# Patient Record
Sex: MALE | Race: WHITE
[De-identification: names, ages, dates, MRNs, and addresses within clinical notes are randomized per-mention and may not be internally consistent; named-entity substitution may affect disease eponyms.]

---

## 2017-03-15 ENCOUNTER — APPOINTMENT (OUTPATIENT)
Dept: INTERNAL MEDICINE | Facility: CLINIC | Age: 57
End: 2017-03-15

## 2017-03-31 ENCOUNTER — APPOINTMENT (OUTPATIENT)
Dept: INTERNAL MEDICINE | Facility: CLINIC | Age: 57
End: 2017-03-31

## 2017-04-07 ENCOUNTER — APPOINTMENT (OUTPATIENT)
Dept: INTERNAL MEDICINE | Facility: CLINIC | Age: 57
End: 2017-04-07

## 2017-04-10 ENCOUNTER — APPOINTMENT (OUTPATIENT)
Dept: OTOLARYNGOLOGY | Facility: CLINIC | Age: 57
End: 2017-04-10

## 2017-04-12 ENCOUNTER — APPOINTMENT (OUTPATIENT)
Dept: INTERNAL MEDICINE | Facility: CLINIC | Age: 57
End: 2017-04-12

## 2017-04-12 VITALS
WEIGHT: 217 LBS | HEIGHT: 72 IN | BODY MASS INDEX: 29.39 KG/M2 | HEART RATE: 61 BPM | DIASTOLIC BLOOD PRESSURE: 98 MMHG | SYSTOLIC BLOOD PRESSURE: 155 MMHG

## 2017-04-13 ENCOUNTER — APPOINTMENT (OUTPATIENT)
Dept: INTERNAL MEDICINE | Facility: CLINIC | Age: 57
End: 2017-04-13

## 2017-04-13 LAB
ALBUMIN SERPL-MCNC: 4.3 G/DL
ALBUMIN/GLOB SERPL: 1.48
ALP SERPL-CCNC: 74 IU/L
ALT SERPL-CCNC: 29 IU/L
ANION GAP SERPL CALC-SCNC: 10 MEQ/L
AST SERPL-CCNC: 30 IU/L
BASOPHILS # BLD: 0.03 TH/MM3
BASOPHILS NFR BLD: 0.3 %
BILIRUB SERPL-MCNC: 0.8 MG/DL
BUN SERPL-MCNC: 9 MG/DL
BUN/CREAT SERPL: 8.5 %
CALCIUM SERPL-MCNC: 10.2 MG/DL
CHLORIDE SERPL-SCNC: 101 MEQ/L
CHOLEST SERPL-MCNC: 146 MG/DL
CO2 SERPL-SCNC: 30 MEQ/L
CREAT SERPL-MCNC: 1.06 MG/DL
EOSINOPHIL # BLD: 0.23 TH/MM3
EOSINOPHIL NFR BLD: 2.6 %
ERYTHROCYTE [DISTWIDTH] IN BLOOD BY AUTOMATED COUNT: 14.6 %
ESTIMATED AVERGAGE GLUCOSE (NORTH): 117 MG/DL
GFR SERPL CREATININE-BSD FRML MDRD: 72
GLUCOSE SERPL-MCNC: 84 MG/DL
GRANULOCYTES # BLD: 4.32 TH/MM3
GRANULOCYTES NFR BLD: 49.8 %
HBA1C MFR BLD: 5.7 %
HCT VFR BLD AUTO: 53.4 %
HDLC SERPL-MCNC: 41 MG/DL
HDLC SERPL: 3.56
HGB BLD-MCNC: 17.8 G/DL
IMM GRANULOCYTES # BLD: 0.02 TH/MM3
IMM GRANULOCYTES NFR BLD: 0.2 %
LDLC SERPL DIRECT ASSAY-MCNC: 82 MG/DL
LYMPHOCYTES # BLD: 3.49 TH/MM3
LYMPHOCYTES NFR BLD: 40.2 %
MCH RBC QN AUTO: 33.3 PG
MCHC RBC AUTO-ENTMCNC: 33.9 G/DL
MCV RBC AUTO: 98.2 FL
MONOCYTES # BLD: 0.6 TH/MM3
MONOCYTES NFR BLD: 6.9 %
PLATELET # BLD: 180 TH/MM3
PMV BLD AUTO: 12.1 FL
POTASSIUM SERPL-SCNC: 4.5 MMOL/L
PROT SERPL-MCNC: 7.2 G/DL
RBC # BLD AUTO: 5.44 MIL/MM3
SODIUM SERPL-SCNC: 141 MEQ/L
TRIGL SERPL-MCNC: 171 MG/DL
VLDLC SERPL-MCNC: 34 MG/DL
WBC # BLD: 8.69 TH/MM3

## 2017-05-10 ENCOUNTER — OUTPATIENT (OUTPATIENT)
Dept: OUTPATIENT SERVICES | Facility: HOSPITAL | Age: 57
LOS: 1 days | Discharge: HOME | End: 2017-05-10

## 2017-05-10 ENCOUNTER — APPOINTMENT (OUTPATIENT)
Dept: INTERNAL MEDICINE | Facility: CLINIC | Age: 57
End: 2017-05-10

## 2017-05-10 VITALS
DIASTOLIC BLOOD PRESSURE: 80 MMHG | HEART RATE: 61 BPM | BODY MASS INDEX: 29.66 KG/M2 | WEIGHT: 219 LBS | HEIGHT: 72 IN | SYSTOLIC BLOOD PRESSURE: 125 MMHG

## 2017-05-10 DIAGNOSIS — R42 DIZZINESS AND GIDDINESS: ICD-10-CM

## 2017-05-24 ENCOUNTER — APPOINTMENT (OUTPATIENT)
Dept: NUTRITION | Facility: CLINIC | Age: 57
End: 2017-05-24

## 2017-05-24 VITALS — WEIGHT: 217.5 LBS | BODY MASS INDEX: 29.5 KG/M2

## 2017-06-28 DIAGNOSIS — E78.5 HYPERLIPIDEMIA, UNSPECIFIED: ICD-10-CM

## 2017-06-28 DIAGNOSIS — I10 ESSENTIAL (PRIMARY) HYPERTENSION: ICD-10-CM

## 2017-06-28 DIAGNOSIS — M54.5 LOW BACK PAIN: ICD-10-CM

## 2017-08-08 ENCOUNTER — OUTPATIENT (OUTPATIENT)
Dept: OUTPATIENT SERVICES | Facility: HOSPITAL | Age: 57
LOS: 1 days | Discharge: HOME | End: 2017-08-08

## 2017-08-08 DIAGNOSIS — K40.30 UNILATERAL INGUINAL HERNIA, WITH OBSTRUCTION, WITHOUT GANGRENE, NOT SPECIFIED AS RECURRENT: ICD-10-CM

## 2017-08-17 ENCOUNTER — OUTPATIENT (OUTPATIENT)
Dept: OUTPATIENT SERVICES | Facility: HOSPITAL | Age: 57
LOS: 1 days | Discharge: HOME | End: 2017-08-17

## 2017-08-17 DIAGNOSIS — K40.30 UNILATERAL INGUINAL HERNIA, WITH OBSTRUCTION, WITHOUT GANGRENE, NOT SPECIFIED AS RECURRENT: ICD-10-CM

## 2017-08-31 ENCOUNTER — OUTPATIENT (OUTPATIENT)
Dept: OUTPATIENT SERVICES | Facility: HOSPITAL | Age: 57
LOS: 1 days | Discharge: HOME | End: 2017-08-31

## 2017-08-31 DIAGNOSIS — K40.30 UNILATERAL INGUINAL HERNIA, WITH OBSTRUCTION, WITHOUT GANGRENE, NOT SPECIFIED AS RECURRENT: ICD-10-CM

## 2017-08-31 DIAGNOSIS — K02.53 DENTAL CARIES ON PIT AND FISSURE SURFACE PENETRATING INTO PULP: ICD-10-CM

## 2017-09-14 ENCOUNTER — OUTPATIENT (OUTPATIENT)
Dept: OUTPATIENT SERVICES | Facility: HOSPITAL | Age: 57
LOS: 1 days | Discharge: HOME | End: 2017-09-14

## 2017-09-14 DIAGNOSIS — K40.30 UNILATERAL INGUINAL HERNIA, WITH OBSTRUCTION, WITHOUT GANGRENE, NOT SPECIFIED AS RECURRENT: ICD-10-CM

## 2017-09-28 ENCOUNTER — OUTPATIENT (OUTPATIENT)
Dept: OUTPATIENT SERVICES | Facility: HOSPITAL | Age: 57
LOS: 1 days | Discharge: HOME | End: 2017-09-28

## 2017-09-28 DIAGNOSIS — K40.30 UNILATERAL INGUINAL HERNIA, WITH OBSTRUCTION, WITHOUT GANGRENE, NOT SPECIFIED AS RECURRENT: ICD-10-CM

## 2017-10-12 ENCOUNTER — OUTPATIENT (OUTPATIENT)
Dept: OUTPATIENT SERVICES | Facility: HOSPITAL | Age: 57
LOS: 1 days | Discharge: HOME | End: 2017-10-12

## 2017-10-12 DIAGNOSIS — K40.30 UNILATERAL INGUINAL HERNIA, WITH OBSTRUCTION, WITHOUT GANGRENE, NOT SPECIFIED AS RECURRENT: ICD-10-CM

## 2017-10-20 ENCOUNTER — OUTPATIENT (OUTPATIENT)
Dept: OUTPATIENT SERVICES | Facility: HOSPITAL | Age: 57
LOS: 1 days | Discharge: HOME | End: 2017-10-20

## 2017-10-20 DIAGNOSIS — K40.30 UNILATERAL INGUINAL HERNIA, WITH OBSTRUCTION, WITHOUT GANGRENE, NOT SPECIFIED AS RECURRENT: ICD-10-CM

## 2017-10-25 ENCOUNTER — APPOINTMENT (OUTPATIENT)
Dept: INTERNAL MEDICINE | Facility: CLINIC | Age: 57
End: 2017-10-25

## 2017-10-25 ENCOUNTER — OUTPATIENT (OUTPATIENT)
Dept: OUTPATIENT SERVICES | Facility: HOSPITAL | Age: 57
LOS: 1 days | Discharge: HOME | End: 2017-10-25

## 2017-10-25 VITALS — HEIGHT: 72 IN | BODY MASS INDEX: 27.09 KG/M2 | WEIGHT: 200 LBS

## 2017-10-25 VITALS
HEART RATE: 59 BPM | SYSTOLIC BLOOD PRESSURE: 130 MMHG | HEIGHT: 72 IN | DIASTOLIC BLOOD PRESSURE: 82 MMHG | WEIGHT: 200 LBS | BODY MASS INDEX: 27.09 KG/M2

## 2017-10-25 DIAGNOSIS — K40.30 UNILATERAL INGUINAL HERNIA, WITH OBSTRUCTION, WITHOUT GANGRENE, NOT SPECIFIED AS RECURRENT: ICD-10-CM

## 2017-10-25 DIAGNOSIS — H61.23 IMPACTED CERUMEN, BILATERAL: ICD-10-CM

## 2017-10-26 DIAGNOSIS — M79.672 PAIN IN LEFT FOOT: ICD-10-CM

## 2017-10-26 DIAGNOSIS — Z23 ENCOUNTER FOR IMMUNIZATION: ICD-10-CM

## 2017-10-26 DIAGNOSIS — I10 ESSENTIAL (PRIMARY) HYPERTENSION: ICD-10-CM

## 2017-10-26 DIAGNOSIS — E78.00 PURE HYPERCHOLESTEROLEMIA, UNSPECIFIED: ICD-10-CM

## 2017-10-27 ENCOUNTER — OUTPATIENT (OUTPATIENT)
Dept: OUTPATIENT SERVICES | Facility: HOSPITAL | Age: 57
LOS: 1 days | Discharge: HOME | End: 2017-10-27

## 2017-10-27 DIAGNOSIS — K40.30 UNILATERAL INGUINAL HERNIA, WITH OBSTRUCTION, WITHOUT GANGRENE, NOT SPECIFIED AS RECURRENT: ICD-10-CM

## 2017-11-08 ENCOUNTER — APPOINTMENT (OUTPATIENT)
Dept: INTERNAL MEDICINE | Facility: CLINIC | Age: 57
End: 2017-11-08

## 2018-02-14 ENCOUNTER — OUTPATIENT (OUTPATIENT)
Dept: OUTPATIENT SERVICES | Facility: HOSPITAL | Age: 58
LOS: 1 days | Discharge: HOME | End: 2018-02-14

## 2018-02-23 ENCOUNTER — OUTPATIENT (OUTPATIENT)
Dept: OUTPATIENT SERVICES | Facility: HOSPITAL | Age: 58
LOS: 1 days | Discharge: HOME | End: 2018-02-23

## 2018-02-23 DIAGNOSIS — K02.52 DENTAL CARIES ON PIT AND FISSURE SURFACE PENETRATING INTO DENTIN: ICD-10-CM

## 2018-03-13 ENCOUNTER — TRANSCRIPTION ENCOUNTER (OUTPATIENT)
Age: 58
End: 2018-03-13

## 2018-06-05 ENCOUNTER — OUTPATIENT (OUTPATIENT)
Dept: OUTPATIENT SERVICES | Facility: HOSPITAL | Age: 58
LOS: 1 days | Discharge: HOME | End: 2018-06-05

## 2018-06-05 ENCOUNTER — APPOINTMENT (OUTPATIENT)
Dept: INTERNAL MEDICINE | Facility: CLINIC | Age: 58
End: 2018-06-05

## 2018-06-05 VITALS
HEIGHT: 72 IN | DIASTOLIC BLOOD PRESSURE: 91 MMHG | SYSTOLIC BLOOD PRESSURE: 156 MMHG | BODY MASS INDEX: 25.87 KG/M2 | WEIGHT: 191 LBS | TEMPERATURE: 98 F | HEART RATE: 60 BPM

## 2018-06-05 NOTE — ASSESSMENT
[FreeTextEntry1] : 1.HTN:elevated BP:recom.continue amlodipine 5 mg,monitor BP t home,f/u in 1 month.\par 2.R.foot pain:x-ray,voltaren gel topical didn't help,meloxicam 15 mg for 2 weeks.\par 3.Cervical radiculopathy:continue gabapentin,dose of metrocarbamol was increased to 3 times daily\par 4.Hypercholesterolemia:check lipids,continue crestor\par 5.Prediabetes:check hgA1C\par

## 2018-06-05 NOTE — PHYSICAL EXAM
[No JVD] : no jugular venous distention [Supple] : supple [Clear to Auscultation] : lungs were clear to auscultation bilaterally [Regular Rhythm] : with a regular rhythm [Normal S1, S2] : normal S1 and S2 [No Edema] : there was no peripheral edema [Soft] : abdomen soft [Non Tender] : non-tender [No Joint Swelling] : no joint swelling

## 2018-06-06 DIAGNOSIS — M79.671 PAIN IN RIGHT FOOT: ICD-10-CM

## 2018-06-06 DIAGNOSIS — E78.00 PURE HYPERCHOLESTEROLEMIA, UNSPECIFIED: ICD-10-CM

## 2018-06-06 DIAGNOSIS — M54.12 RADICULOPATHY, CERVICAL REGION: ICD-10-CM

## 2018-06-06 DIAGNOSIS — I10 ESSENTIAL (PRIMARY) HYPERTENSION: ICD-10-CM

## 2018-06-25 ENCOUNTER — OUTPATIENT (OUTPATIENT)
Dept: OUTPATIENT SERVICES | Facility: HOSPITAL | Age: 58
LOS: 1 days | Discharge: HOME | End: 2018-06-25

## 2018-06-25 DIAGNOSIS — M79.671 PAIN IN RIGHT FOOT: ICD-10-CM

## 2018-06-26 LAB
ALBUMIN SERPL ELPH-MCNC: 4.3 G/DL
ALP BLD-CCNC: 79 U/L
ALT SERPL-CCNC: 13 U/L
ANION GAP SERPL CALC-SCNC: 16 MMOL/L
AST SERPL-CCNC: 19 U/L
BASOPHILS # BLD AUTO: 0.05 K/UL
BASOPHILS NFR BLD AUTO: 0.6 %
BILIRUB SERPL-MCNC: 0.5 MG/DL
BUN SERPL-MCNC: 10 MG/DL
CALCIUM SERPL-MCNC: 9.1 MG/DL
CHLORIDE SERPL-SCNC: 102 MMOL/L
CHOLEST SERPL-MCNC: 123 MG/DL
CHOLEST/HDLC SERPL: 2.4 RATIO
CO2 SERPL-SCNC: 24 MMOL/L
CREAT SERPL-MCNC: 0.8 MG/DL
EOSINOPHIL # BLD AUTO: 0.27 K/UL
EOSINOPHIL NFR BLD AUTO: 3.2 %
ESTIMATED AVERAGE GLUCOSE: 111 MG/DL
GLUCOSE SERPL-MCNC: 89 MG/DL
HBA1C MFR BLD HPLC: 5.5 %
HCT VFR BLD CALC: 49.8 %
HDLC SERPL-MCNC: 51 MG/DL
HGB BLD-MCNC: 16.6 G/DL
IMM GRANULOCYTES NFR BLD AUTO: 0.4 %
LDLC SERPL CALC-MCNC: 60 MG/DL
LYMPHOCYTES # BLD AUTO: 3.14 K/UL
LYMPHOCYTES NFR BLD AUTO: 36.6 %
MAN DIFF?: NORMAL
MCHC RBC-ENTMCNC: 32.5 PG
MCHC RBC-ENTMCNC: 33.3 G/DL
MCV RBC AUTO: 97.6 FL
MONOCYTES # BLD AUTO: 0.56 K/UL
MONOCYTES NFR BLD AUTO: 6.5 %
NEUTROPHILS # BLD AUTO: 4.52 K/UL
NEUTROPHILS NFR BLD AUTO: 52.7 %
PLATELET # BLD AUTO: 174 K/UL
POTASSIUM SERPL-SCNC: 3.8 MMOL/L
PROT SERPL-MCNC: 6.7 G/DL
RBC # BLD: 5.1 M/UL
RBC # FLD: 14 %
SODIUM SERPL-SCNC: 142 MMOL/L
TRIGL SERPL-MCNC: 79 MG/DL
WBC # FLD AUTO: 8.57 K/UL

## 2018-07-26 ENCOUNTER — APPOINTMENT (OUTPATIENT)
Dept: INTERNAL MEDICINE | Facility: CLINIC | Age: 58
End: 2018-07-26

## 2018-07-26 ENCOUNTER — OUTPATIENT (OUTPATIENT)
Dept: OUTPATIENT SERVICES | Facility: HOSPITAL | Age: 58
LOS: 1 days | Discharge: HOME | End: 2018-07-26

## 2018-07-26 VITALS
WEIGHT: 192 LBS | HEIGHT: 72 IN | BODY MASS INDEX: 26.01 KG/M2 | HEART RATE: 56 BPM | TEMPERATURE: 96.7 F | DIASTOLIC BLOOD PRESSURE: 81 MMHG | SYSTOLIC BLOOD PRESSURE: 132 MMHG

## 2018-07-26 DIAGNOSIS — E78.00 PURE HYPERCHOLESTEROLEMIA, UNSPECIFIED: ICD-10-CM

## 2018-07-26 DIAGNOSIS — I10 ESSENTIAL (PRIMARY) HYPERTENSION: ICD-10-CM

## 2018-07-26 NOTE — PHYSICAL EXAM
[Supple] : supple [Clear to Auscultation] : lungs were clear to auscultation bilaterally [Regular Rhythm] : with a regular rhythm [Normal S1, S2] : normal S1 and S2 [No Murmur] : no murmur heard [No Edema] : there was no peripheral edema [Soft] : abdomen soft [Non Tender] : non-tender [Non-distended] : non-distended [No CVA Tenderness] : no CVA  tenderness [No Joint Swelling] : no joint swelling [de-identified] : nodules on the skin

## 2018-07-26 NOTE — PLAN
[FreeTextEntry1] : 1.HTN:per home readings BP is well controlled on amlodipine\par 2.Right foot pain:on x ray severe degenerative changes;per pt pain was decreased on meloxicam and diclofenac gel;recom.podiatry consult\par 3.Hypercholesterolemia:continue rosuvastatin\par 4.Nodules on the skin:dermatology consult

## 2018-08-17 ENCOUNTER — EMERGENCY (EMERGENCY)
Facility: HOSPITAL | Age: 58
LOS: 0 days | Discharge: HOME | End: 2018-08-18
Attending: EMERGENCY MEDICINE | Admitting: EMERGENCY MEDICINE

## 2018-08-17 ENCOUNTER — TRANSCRIPTION ENCOUNTER (OUTPATIENT)
Age: 58
End: 2018-08-17

## 2018-08-17 VITALS
TEMPERATURE: 99 F | OXYGEN SATURATION: 97 % | DIASTOLIC BLOOD PRESSURE: 69 MMHG | HEART RATE: 70 BPM | SYSTOLIC BLOOD PRESSURE: 130 MMHG | RESPIRATION RATE: 18 BRPM

## 2018-08-17 DIAGNOSIS — R07.9 CHEST PAIN, UNSPECIFIED: ICD-10-CM

## 2018-08-17 DIAGNOSIS — F17.210 NICOTINE DEPENDENCE, CIGARETTES, UNCOMPLICATED: ICD-10-CM

## 2018-08-17 DIAGNOSIS — R91.1 SOLITARY PULMONARY NODULE: ICD-10-CM

## 2018-08-17 DIAGNOSIS — I10 ESSENTIAL (PRIMARY) HYPERTENSION: ICD-10-CM

## 2018-08-17 DIAGNOSIS — E78.4 OTHER HYPERLIPIDEMIA: ICD-10-CM

## 2018-08-17 DIAGNOSIS — Z88.0 ALLERGY STATUS TO PENICILLIN: ICD-10-CM

## 2018-08-17 LAB
ALBUMIN SERPL ELPH-MCNC: 4.3 G/DL — SIGNIFICANT CHANGE UP (ref 3.5–5.2)
ALP SERPL-CCNC: 79 U/L — SIGNIFICANT CHANGE UP (ref 30–115)
ALT FLD-CCNC: 15 U/L — SIGNIFICANT CHANGE UP (ref 0–41)
ANION GAP SERPL CALC-SCNC: 11 MMOL/L — SIGNIFICANT CHANGE UP (ref 7–14)
APTT BLD: 44.9 SEC — HIGH (ref 27–39.2)
AST SERPL-CCNC: 21 U/L — SIGNIFICANT CHANGE UP (ref 0–41)
BASOPHILS # BLD AUTO: 0.05 K/UL — SIGNIFICANT CHANGE UP (ref 0–0.2)
BASOPHILS NFR BLD AUTO: 0.5 % — SIGNIFICANT CHANGE UP (ref 0–1)
BILIRUB SERPL-MCNC: 0.4 MG/DL — SIGNIFICANT CHANGE UP (ref 0.2–1.2)
BUN SERPL-MCNC: 11 MG/DL — SIGNIFICANT CHANGE UP (ref 10–20)
CALCIUM SERPL-MCNC: 9.9 MG/DL — SIGNIFICANT CHANGE UP (ref 8.5–10.1)
CHLORIDE SERPL-SCNC: 105 MMOL/L — SIGNIFICANT CHANGE UP (ref 98–110)
CO2 SERPL-SCNC: 26 MMOL/L — SIGNIFICANT CHANGE UP (ref 17–32)
CREAT SERPL-MCNC: 0.9 MG/DL — SIGNIFICANT CHANGE UP (ref 0.7–1.5)
EOSINOPHIL # BLD AUTO: 0.16 K/UL — SIGNIFICANT CHANGE UP (ref 0–0.7)
EOSINOPHIL NFR BLD AUTO: 1.8 % — SIGNIFICANT CHANGE UP (ref 0–8)
GLUCOSE SERPL-MCNC: 94 MG/DL — SIGNIFICANT CHANGE UP (ref 70–99)
HCT VFR BLD CALC: 47.6 % — SIGNIFICANT CHANGE UP (ref 42–52)
HGB BLD-MCNC: 16.1 G/DL — SIGNIFICANT CHANGE UP (ref 14–18)
IMM GRANULOCYTES NFR BLD AUTO: 0.2 % — SIGNIFICANT CHANGE UP (ref 0.1–0.3)
INR BLD: 0.93 RATIO — SIGNIFICANT CHANGE UP (ref 0.65–1.3)
LYMPHOCYTES # BLD AUTO: 2.45 K/UL — SIGNIFICANT CHANGE UP (ref 1.2–3.4)
LYMPHOCYTES # BLD AUTO: 26.8 % — SIGNIFICANT CHANGE UP (ref 20.5–51.1)
MCHC RBC-ENTMCNC: 32.1 PG — HIGH (ref 27–31)
MCHC RBC-ENTMCNC: 33.8 G/DL — SIGNIFICANT CHANGE UP (ref 32–37)
MCV RBC AUTO: 95 FL — HIGH (ref 80–94)
MONOCYTES # BLD AUTO: 0.58 K/UL — SIGNIFICANT CHANGE UP (ref 0.1–0.6)
MONOCYTES NFR BLD AUTO: 6.4 % — SIGNIFICANT CHANGE UP (ref 1.7–9.3)
NEUTROPHILS # BLD AUTO: 5.87 K/UL — SIGNIFICANT CHANGE UP (ref 1.4–6.5)
NEUTROPHILS NFR BLD AUTO: 64.3 % — SIGNIFICANT CHANGE UP (ref 42.2–75.2)
NRBC # BLD: 0 /100 WBCS — SIGNIFICANT CHANGE UP (ref 0–0)
PLATELET # BLD AUTO: 166 K/UL — SIGNIFICANT CHANGE UP (ref 130–400)
POTASSIUM SERPL-MCNC: 5.1 MMOL/L — HIGH (ref 3.5–5)
POTASSIUM SERPL-SCNC: 5.1 MMOL/L — HIGH (ref 3.5–5)
PROT SERPL-MCNC: 6.7 G/DL — SIGNIFICANT CHANGE UP (ref 6–8)
PROTHROM AB SERPL-ACNC: 10 SEC — SIGNIFICANT CHANGE UP (ref 9.95–12.87)
RBC # BLD: 5.01 M/UL — SIGNIFICANT CHANGE UP (ref 4.7–6.1)
RBC # FLD: 13.8 % — SIGNIFICANT CHANGE UP (ref 11.5–14.5)
SODIUM SERPL-SCNC: 142 MMOL/L — SIGNIFICANT CHANGE UP (ref 135–146)
TROPONIN T SERPL-MCNC: <0.01 NG/ML — SIGNIFICANT CHANGE UP
TROPONIN T SERPL-MCNC: <0.01 NG/ML — SIGNIFICANT CHANGE UP
WBC # BLD: 9.13 K/UL — SIGNIFICANT CHANGE UP (ref 4.8–10.8)
WBC # FLD AUTO: 9.13 K/UL — SIGNIFICANT CHANGE UP (ref 4.8–10.8)

## 2018-08-17 RX ORDER — DIPHENHYDRAMINE HCL 50 MG
50 CAPSULE ORAL ONCE
Qty: 0 | Refills: 0 | Status: COMPLETED | OUTPATIENT
Start: 2018-08-17 | End: 2018-08-17

## 2018-08-17 RX ORDER — ATORVASTATIN CALCIUM 80 MG/1
40 TABLET, FILM COATED ORAL ONCE
Qty: 0 | Refills: 0 | Status: COMPLETED | OUTPATIENT
Start: 2018-08-17 | End: 2018-08-17

## 2018-08-17 RX ORDER — METHOCARBAMOL 500 MG/1
750 TABLET, FILM COATED ORAL ONCE
Qty: 0 | Refills: 0 | Status: COMPLETED | OUTPATIENT
Start: 2018-08-17 | End: 2018-08-17

## 2018-08-17 RX ORDER — SODIUM CHLORIDE 9 MG/ML
3 INJECTION INTRAMUSCULAR; INTRAVENOUS; SUBCUTANEOUS ONCE
Qty: 0 | Refills: 0 | Status: COMPLETED | OUTPATIENT
Start: 2018-08-17 | End: 2018-08-17

## 2018-08-17 RX ORDER — ASPIRIN/CALCIUM CARB/MAGNESIUM 324 MG
325 TABLET ORAL ONCE
Qty: 0 | Refills: 0 | Status: COMPLETED | OUTPATIENT
Start: 2018-08-17 | End: 2018-08-17

## 2018-08-17 RX ORDER — GABAPENTIN 400 MG/1
100 CAPSULE ORAL ONCE
Qty: 0 | Refills: 0 | Status: COMPLETED | OUTPATIENT
Start: 2018-08-17 | End: 2018-08-17

## 2018-08-17 RX ADMIN — Medication 325 MILLIGRAM(S): at 14:22

## 2018-08-17 RX ADMIN — ATORVASTATIN CALCIUM 40 MILLIGRAM(S): 80 TABLET, FILM COATED ORAL at 21:19

## 2018-08-17 RX ADMIN — GABAPENTIN 100 MILLIGRAM(S): 400 CAPSULE ORAL at 21:19

## 2018-08-17 RX ADMIN — METHOCARBAMOL 750 MILLIGRAM(S): 500 TABLET, FILM COATED ORAL at 21:20

## 2018-08-17 RX ADMIN — SODIUM CHLORIDE 3 MILLILITER(S): 9 INJECTION INTRAMUSCULAR; INTRAVENOUS; SUBCUTANEOUS at 14:00

## 2018-08-17 RX ADMIN — Medication 50 MILLIGRAM(S): at 23:03

## 2018-08-17 NOTE — ED ADULT NURSE NOTE - NSIMPLEMENTINTERV_GEN_ALL_ED
Implemented All Universal Safety Interventions:  [place] to call system. Call bell, personal items and telephone within reach. Instruct patient to call for assistance. Room bathroom lighting operational. Non-slip footwear when patient is off stretcher. Physically safe environment: no spills, clutter or unnecessary equipment. Stretcher in lowest position, wheels locked, appropriate side rails in place.

## 2018-08-17 NOTE — ED PROVIDER NOTE - NS ED ROS FT
Constitutional: see HPI  Eyes:  No visual changes, eye pain or discharge.  ENMT:  No hearing changes, pain, discharge or infections. No neck pain or stiffness.  Cardiac:  see HPI  Respiratory:  No cough or respiratory distress. No hemoptysis. No history of asthma or RAD.  GI:  No nausea, vomiting, diarrhea or abdominal pain.  :  No dysuria, frequency or burning.  MS:  No myalgia, muscle weakness, joint pain or back pain.  Neuro:  No headache or weakness.  No LOC.  Skin:  No skin rash.   Endocrine: No history of thyroid disease or diabetes.

## 2018-08-17 NOTE — ED ADULT NURSE NOTE - OBJECTIVE STATEMENT
Pt c/o chest tightness intermittently over the past month. Pt reports pain is worse on exertion. Pt states he developed pain yesterday while mowing the lawn. No SOB. No other complaints.

## 2018-08-17 NOTE — ED PROVIDER NOTE - PHYSICAL EXAMINATION
VITAL SIGNS: noted  CONSTITUTIONAL: Well-developed; well-nourished; in no acute distress  HEAD: Normocephalic; atraumatic  EYES: PERRL, EOM intact; conjunctiva and sclera clear  ENT: No nasal discharge; airway clear. MMM  NECK: Supple; non tender. No anterior cervical lymphadenopathy noted  CARD: S1, S2 normal; no murmurs, gallops, or rubs. Regular rate and rhythm  CHEST: no chest wall tenderness  RESP: CTAB/L, no wheezes, rales or rhonchi  ABD: Normal bowel sounds; soft; non-distended; non-tender; no hepatosplenomegaly. No CVA tenderness  EXT: Normal ROM. No calf tenderness or edema. Distal pulses intact  NEURO: Alert, oriented. Grossly unremarkable. No focal deficits

## 2018-08-17 NOTE — ED ADULT NURSE REASSESSMENT NOTE - NS ED NURSE REASSESS COMMENT FT1
Pt A&Ox4, on observation for chest pain. Pt pending CCTA. Pt denying chest pain, sob at this time. Pt in no acute distress. Pt refusing cardiac monitoring, PA aware. Will continue to monitor

## 2018-08-17 NOTE — ED CDU PROVIDER INITIAL DAY NOTE - OBJECTIVE STATEMENT
Patient sent to [facility] for further evaluation of atypical chest pains for 2-3 days, denies NV no SOB, no diaphoresis

## 2018-08-17 NOTE — ED CDU PROVIDER INITIAL DAY NOTE - PROGRESS NOTE DETAILS
Pt seen at bedside, NAD. Arrived during the daytime, received from Dr Goldstein. Pt presenting for chest pain. Cardiac enzymes negative x 2. Pt scheduled for CCTA in the am. Will continue to monitor. Home medications given to patient, included Gabapentin 100mg, Robaxin 750mg and Atorvastatin 40mg.

## 2018-08-17 NOTE — ED PROVIDER NOTE - OBJECTIVE STATEMENT
59 yo male with PMH HTN, HLD presents c/o intermittent chest pain x 1 month. Pt states last episodes were yesterday and occurred while he was mowing the lawn. Denies any SOB, palpitations, weakness or dizziness. No radiation of pain.  Denies any N/V/D or abdominal pain.  No fevers, chills, cough or URI sx. Pt father  from MI at 71. No previous cardiac workup.

## 2018-08-17 NOTE — ED CDU PROVIDER INITIAL DAY NOTE - ATTENDING CONTRIBUTION TO CARE
Brought to [facility] for further evaluation of atypical chest pains, lungs- clear, abdomen- soft no tenderness to any region, neuro- non focal, s1s2 no gallops or murmurs, full workup in progress will continue to re-evaluate

## 2018-08-17 NOTE — ED ADULT NURSE REASSESSMENT NOTE - NS ED NURSE REASSESS COMMENT FT1
Pt refusing cardiac monitoring. Pt instructed of need for cardiac monitoring, pt with chief complaint of chest pain. Pt verbalizes understanding and does not want cardiac monitoring. PA aware

## 2018-08-18 VITALS
OXYGEN SATURATION: 97 % | DIASTOLIC BLOOD PRESSURE: 74 MMHG | RESPIRATION RATE: 18 BRPM | HEART RATE: 57 BPM | TEMPERATURE: 97 F | SYSTOLIC BLOOD PRESSURE: 125 MMHG

## 2018-08-18 RX ORDER — METOPROLOL TARTRATE 50 MG
50 TABLET ORAL ONCE
Qty: 0 | Refills: 0 | Status: COMPLETED | OUTPATIENT
Start: 2018-08-18 | End: 2018-08-18

## 2018-08-18 RX ORDER — GABAPENTIN 400 MG/1
100 CAPSULE ORAL ONCE
Qty: 0 | Refills: 0 | Status: COMPLETED | OUTPATIENT
Start: 2018-08-18 | End: 2018-08-18

## 2018-08-18 RX ADMIN — Medication 50 MILLIGRAM(S): at 07:57

## 2018-08-18 RX ADMIN — GABAPENTIN 100 MILLIGRAM(S): 400 CAPSULE ORAL at 11:12

## 2018-08-18 NOTE — ED CDU PROVIDER SUBSEQUENT DAY NOTE - NS ED ROS FT
Review of Systems:  CONSTITUTIONAL: no fever  EYES: no photophobia, no blurred vision  ENT: no ear pain, no nasal discharge  RESPIRATORY: no shortness of breath, no cough  CARDIAC:+ chest pain, no palpitations  GI: no abd pain, no nausea, no vomiting, no diarrhea, no constipation,   : no dysuria; no hematuria,   MUSCULOSKELETAL: no joint paint  NEUROLOGIC: no headache,   PSYCH: no anxiety, non suicidal, non homicidal, no hallucination, no depression

## 2018-08-18 NOTE — ED CDU PROVIDER SUBSEQUENT DAY NOTE - PHYSICAL EXAMINATION
VITAL SIGNS: I have reviewed nursing notes and confirm.  CONSTITUTIONAL: Well-developed; well-nourished; in no acute distress.  SKIN: Skin exam is warm and dry, <2 sec cap refill  HEAD: Normocephalic; atraumatic.  EYES: PERRL, EOM intact; normal conjunctiva.  ENT: MMM; airway clear.   CARD: RRR, 2+ dp pulses  RESP: No wheezes, rales or rhonchi, speaking in full sentences  ABD: soft non tender.   EXT: Normal ROM. No edema.  NEURO: Alert, oriented. Grossly unremarkable. No focal deficits.  PSYCH: Cooperative, appropriate.

## 2018-08-18 NOTE — ED CDU PROVIDER SUBSEQUENT DAY NOTE - PROGRESS NOTE DETAILS
57 y/o M, h/o HTN, hyperlipidemia, tomacco use, presents with inermittent CP onset a month ago. Pt states his pain can start at rest or on exercsion. Currently pt is asymptomatic and without complaints. pt has never seen a cardiologist in the past. pt given 50mg of lopressor PO. resting comfortably. awaiting CCTA. Pt back from CCTA. pt without complaints, eating breakfast and awaiting results. Dr. Goldstein discussed results with pt and discussed follow up for pulmonary nodule seen on CT. pt given a copy of results and encouraged to follow up with cardiology.

## 2018-08-18 NOTE — ED CDU PROVIDER SUBSEQUENT DAY NOTE - HISTORY
Pt resting comfortably, NAD. Pt scheduled for CCTA this am. Pt given Benadryl 50mg IV to sleep @ 11pm

## 2018-08-18 NOTE — ED CDU PROVIDER DISPOSITION NOTE - CLINICAL COURSE
Patient was sent to [facility] for further evaluation of atypical chest pains, Has remained in no distress and with stable vitals since arrival to [facility] , ekgs times two no evidence of ischemic changes, enzymes times two normal, CCTA minimal stenosis to coronaries with multiple lung nodules, Patient advised to STOP SMOKING and must fallow up with both PMD for further evaluation of pulmonary nodules and cardiology for mininimal stenosis, Patient discharged completely asymptomatic and without any complaints. Copies of all blood work and other studies were given to patient

## 2018-08-20 PROBLEM — E78.5 HYPERLIPIDEMIA, UNSPECIFIED: Chronic | Status: ACTIVE | Noted: 2018-08-17

## 2018-08-20 PROBLEM — I10 ESSENTIAL (PRIMARY) HYPERTENSION: Chronic | Status: ACTIVE | Noted: 2018-08-17

## 2018-08-22 ENCOUNTER — OUTPATIENT (OUTPATIENT)
Dept: OUTPATIENT SERVICES | Facility: HOSPITAL | Age: 58
LOS: 1 days | Discharge: HOME | End: 2018-08-22

## 2018-08-22 ENCOUNTER — APPOINTMENT (OUTPATIENT)
Dept: PODIATRY | Facility: CLINIC | Age: 58
End: 2018-08-22
Payer: MEDICARE

## 2018-08-22 VITALS
WEIGHT: 192 LBS | DIASTOLIC BLOOD PRESSURE: 78 MMHG | SYSTOLIC BLOOD PRESSURE: 127 MMHG | HEIGHT: 72 IN | BODY MASS INDEX: 26.01 KG/M2 | HEART RATE: 58 BPM

## 2018-08-22 PROCEDURE — 99203 OFFICE O/P NEW LOW 30 MIN: CPT | Mod: 25

## 2018-08-22 PROCEDURE — 20600 DRAIN/INJ JOINT/BURSA W/O US: CPT

## 2018-08-31 ENCOUNTER — OUTPATIENT (OUTPATIENT)
Dept: OUTPATIENT SERVICES | Facility: HOSPITAL | Age: 58
LOS: 1 days | Discharge: HOME | End: 2018-08-31

## 2018-08-31 ENCOUNTER — APPOINTMENT (OUTPATIENT)
Dept: INTERNAL MEDICINE | Facility: CLINIC | Age: 58
End: 2018-08-31

## 2018-08-31 VITALS
DIASTOLIC BLOOD PRESSURE: 83 MMHG | HEART RATE: 59 BPM | SYSTOLIC BLOOD PRESSURE: 130 MMHG | TEMPERATURE: 98.2 F | HEIGHT: 72 IN | WEIGHT: 188 LBS | BODY MASS INDEX: 25.47 KG/M2

## 2018-08-31 NOTE — PHYSICAL EXAM
[Supple] : supple [Clear to Auscultation] : lungs were clear to auscultation bilaterally [Regular Rhythm] : with a regular rhythm [No Edema] : there was no peripheral edema [Soft] : abdomen soft [Non Tender] : non-tender [Non-distended] : non-distended

## 2018-08-31 NOTE — ASSESSMENT
[FreeTextEntry1] : 58 M here for routine f/u and recent admission for CP\par \par 1. Chest pain \par - CT angiogram-no obstructive CAD\par \par 2. left lobe lung nodules found accidently on CT angiogram\par - PET scan\par - pulmonologist referral, scheduled for Sept 11 2018\par \par 3. HTN\par - controlled \par - pt check BP logs every day\par - average /80 +/- 10 \par - c/w current BP meds\par \par 4. chronic lbp, joint pain\par - c/w meloxicam, robaxin\par \par 5. right foot osteoarthritis\par - podiatrist f/u in one month \par - s/p steroid injection\par \par 6. Skin nodules\par - dermatology appointment scheduled\par 7. HCM\par - colonoscopy done 2010?\par - pneumococcal vaccine at 65\par - RTC 3 months, flu vaccine \par - up to date with optometry- new prescription for glasses given this year

## 2018-08-31 NOTE — REVIEW OF SYSTEMS
[Earache] : earache [Chest Pain] : chest pain [Joint Pain] : joint pain [Joint Stiffness] : joint stiffness [Back Pain] : back pain [Negative] : Heme/Lymph [Hearing Loss] : no hearing loss [Nosebleeds] : no nosebleeds [Postnasal Drip] : no postnasal drip [Nasal Discharge] : no nasal discharge [Sore Throat] : no sore throat [Hoarseness] : no hoarseness [Palpitations] : no palpitations [Claudication] : no  leg claudication [Lower Ext Edema] : no lower extremity edema [Orthopena] : no orthopnea [Paroysmal Nocturnal Dyspnea] : no paroysmal nocturnal dyspnea [Muscle Pain] : no muscle pain [Muscle Weakness] : no muscle weakness [Joint Swelling] : no joint swelling [FreeTextEntry4] : left, stuffiness and achy, intermittent [FreeTextEntry5] : left sided, intermittent

## 2018-08-31 NOTE — HISTORY OF PRESENT ILLNESS
[FreeTextEntry1] : routine f/u  [de-identified] : 58 M presents for f/u visit. On 08/16 pt was mowing lawn on a hot day, felt chest tightness on the left side, which pt states has been coming and going for the past month, improves with rest and calming down. The tightness came more with exertion. The pain radiated to the arm, pt explained of pressure on the left side of the chest. Pt did not have heart palpitations, SOB , n/v , Ha, dizziness. On 8/17, the pt checkin to [facility] where an ECG was done which showed left atrial enlargement, NSR, low voltage in precordial leads. The physician recommended pt go to the ER. Pt drove to the ER where they did additional ECGs, which demonstrated NSR, RV conduction delay, and possible anterior infarct of unknown age. Troponins were negative, CXR was unremarkable. A CT heart with coronaries revealed minimal luminal irregularity of left main coronary artery, prox to mid LAD, and mid Lcx 2/2 calcified plaques, total coronary calcium score 146, corresponding to 94th percentile. Emphysematous changes of lungs, multiple left upper lobe anterior solid nodules measuring up to 1.1 cm, mild enlarged mediastinal and right hilar LN. A PET scan was recommended for further testing. The pt reports smoking up to 1 ppd for 20 years.

## 2018-09-04 DIAGNOSIS — E78.00 PURE HYPERCHOLESTEROLEMIA, UNSPECIFIED: ICD-10-CM

## 2018-09-04 DIAGNOSIS — R91.8 OTHER NONSPECIFIC ABNORMAL FINDING OF LUNG FIELD: ICD-10-CM

## 2018-09-04 DIAGNOSIS — I10 ESSENTIAL (PRIMARY) HYPERTENSION: ICD-10-CM

## 2018-09-10 ENCOUNTER — EMERGENCY (EMERGENCY)
Facility: HOSPITAL | Age: 58
LOS: 0 days | Discharge: HOME | End: 2018-09-10
Attending: EMERGENCY MEDICINE | Admitting: EMERGENCY MEDICINE

## 2018-09-10 VITALS
TEMPERATURE: 99 F | HEART RATE: 60 BPM | RESPIRATION RATE: 18 BRPM | DIASTOLIC BLOOD PRESSURE: 75 MMHG | OXYGEN SATURATION: 98 % | SYSTOLIC BLOOD PRESSURE: 128 MMHG

## 2018-09-10 VITALS
DIASTOLIC BLOOD PRESSURE: 85 MMHG | HEART RATE: 57 BPM | SYSTOLIC BLOOD PRESSURE: 134 MMHG | OXYGEN SATURATION: 98 % | TEMPERATURE: 97 F | RESPIRATION RATE: 18 BRPM

## 2018-09-10 DIAGNOSIS — Z79.899 OTHER LONG TERM (CURRENT) DRUG THERAPY: ICD-10-CM

## 2018-09-10 DIAGNOSIS — Z88.0 ALLERGY STATUS TO PENICILLIN: ICD-10-CM

## 2018-09-10 DIAGNOSIS — F17.200 NICOTINE DEPENDENCE, UNSPECIFIED, UNCOMPLICATED: ICD-10-CM

## 2018-09-10 DIAGNOSIS — Z79.82 LONG TERM (CURRENT) USE OF ASPIRIN: ICD-10-CM

## 2018-09-10 DIAGNOSIS — R06.02 SHORTNESS OF BREATH: ICD-10-CM

## 2018-09-10 DIAGNOSIS — R07.89 OTHER CHEST PAIN: ICD-10-CM

## 2018-09-10 LAB
ANION GAP SERPL CALC-SCNC: 15 MMOL/L — HIGH (ref 7–14)
BASOPHILS # BLD AUTO: 0.05 K/UL — SIGNIFICANT CHANGE UP (ref 0–0.2)
BASOPHILS NFR BLD AUTO: 0.5 % — SIGNIFICANT CHANGE UP (ref 0–1)
BUN SERPL-MCNC: 10 MG/DL — SIGNIFICANT CHANGE UP (ref 10–20)
CALCIUM SERPL-MCNC: 9.3 MG/DL — SIGNIFICANT CHANGE UP (ref 8.5–10.1)
CHLORIDE SERPL-SCNC: 102 MMOL/L — SIGNIFICANT CHANGE UP (ref 98–110)
CO2 SERPL-SCNC: 24 MMOL/L — SIGNIFICANT CHANGE UP (ref 17–32)
CREAT SERPL-MCNC: 0.9 MG/DL — SIGNIFICANT CHANGE UP (ref 0.7–1.5)
D DIMER BLD IA.RAPID-MCNC: 135 NG/ML DDU — SIGNIFICANT CHANGE UP (ref 0–230)
EOSINOPHIL # BLD AUTO: 0.11 K/UL — SIGNIFICANT CHANGE UP (ref 0–0.7)
EOSINOPHIL NFR BLD AUTO: 1.1 % — SIGNIFICANT CHANGE UP (ref 0–8)
GLUCOSE SERPL-MCNC: 95 MG/DL — SIGNIFICANT CHANGE UP (ref 70–99)
HCT VFR BLD CALC: 46.2 % — SIGNIFICANT CHANGE UP (ref 42–52)
HGB BLD-MCNC: 15.8 G/DL — SIGNIFICANT CHANGE UP (ref 14–18)
IMM GRANULOCYTES NFR BLD AUTO: 0.2 % — SIGNIFICANT CHANGE UP (ref 0.1–0.3)
LYMPHOCYTES # BLD AUTO: 2.64 K/UL — SIGNIFICANT CHANGE UP (ref 1.2–3.4)
LYMPHOCYTES # BLD AUTO: 27.5 % — SIGNIFICANT CHANGE UP (ref 20.5–51.1)
MCHC RBC-ENTMCNC: 32.5 PG — HIGH (ref 27–31)
MCHC RBC-ENTMCNC: 34.2 G/DL — SIGNIFICANT CHANGE UP (ref 32–37)
MCV RBC AUTO: 95.1 FL — HIGH (ref 80–94)
MONOCYTES # BLD AUTO: 0.64 K/UL — HIGH (ref 0.1–0.6)
MONOCYTES NFR BLD AUTO: 6.7 % — SIGNIFICANT CHANGE UP (ref 1.7–9.3)
NEUTROPHILS # BLD AUTO: 6.14 K/UL — SIGNIFICANT CHANGE UP (ref 1.4–6.5)
NEUTROPHILS NFR BLD AUTO: 64 % — SIGNIFICANT CHANGE UP (ref 42.2–75.2)
PLATELET # BLD AUTO: 153 K/UL — SIGNIFICANT CHANGE UP (ref 130–400)
POTASSIUM SERPL-MCNC: 4.5 MMOL/L — SIGNIFICANT CHANGE UP (ref 3.5–5)
POTASSIUM SERPL-SCNC: 4.5 MMOL/L — SIGNIFICANT CHANGE UP (ref 3.5–5)
RBC # BLD: 4.86 M/UL — SIGNIFICANT CHANGE UP (ref 4.7–6.1)
RBC # FLD: 13.7 % — SIGNIFICANT CHANGE UP (ref 11.5–14.5)
SODIUM SERPL-SCNC: 141 MMOL/L — SIGNIFICANT CHANGE UP (ref 135–146)
TROPONIN T SERPL-MCNC: <0.01 NG/ML — SIGNIFICANT CHANGE UP
WBC # BLD: 9.6 K/UL — SIGNIFICANT CHANGE UP (ref 4.8–10.8)
WBC # FLD AUTO: 9.6 K/UL — SIGNIFICANT CHANGE UP (ref 4.8–10.8)

## 2018-09-10 RX ORDER — ALBUTEROL 90 UG/1
2 AEROSOL, METERED ORAL
Qty: 1 | Refills: 0
Start: 2018-09-10 | End: 2018-10-09

## 2018-09-10 NOTE — ED PROVIDER NOTE - PHYSICAL EXAMINATION
CONSTITUTIONAL: Well-developed; well-nourished; in no acute distress.   SKIN: warm, dry  HEAD: Normocephalic; atraumatic.  CARD: S1, S2 normal; no murmurs, gallops, or rubs. Regular rate and rhythm.   RESP: No wheezes, rales or rhonchi.  ABD: soft ntnd  EXT: Normal ROM.  No clubbing, cyanosis or edema.   NEURO: Alert, oriented, grossly unremarkable  PSYCH: Cooperative, appropriate.

## 2018-09-10 NOTE — ED PROVIDER NOTE - OBJECTIVE STATEMENT
59 y/o male with pmhx of HTN, DLD presents with chest tightness that this morning. Patient states the pain is located on L side, non-radiating; transient. Patient was recently in the ED on 8/18, was placed into obs and received a CCTA with CAD-RAD 1. Patient states he has felt intermittent chest tightness since he left the hospital. Patient states he has an appt with a pulmonologist tomorrow for a 11mm nodule that was found on CCTA. Denies fevers/chils, sob, n/v/d/abdominal pain, leg swelling, calf tenderness, recent travel.

## 2018-09-10 NOTE — ED PROVIDER NOTE - ATTENDING CONTRIBUTION TO CARE
I personally evaluated patient. I agree with the findings and plan with all documentation on chart except as documented  in my note.    Patient presents with atypical chest pain.  Recent CCTA done here shows minimal changes.  Clinically this is not cardiac.  ED work up, including d-dimer normal. Patient is a good candidate for continued outpatient work up.    Full DC instructions discussed and patient knows when to seek immediate medical attention.  Patient has proper follow up.  All results discussed and patient aware they may require further work up.  Proper follow up ensured. Limitations of ED work up discussed.  Medications administered and prescribed/OTC home meds discussed.  All questions and concerns from patient or family addressed. Understanding of instructions verbalized.

## 2018-09-10 NOTE — ED ADULT NURSE NOTE - NSIMPLEMENTINTERV_GEN_ALL_ED
Implemented All Universal Safety Interventions:  [place] to call system. Call bell, personal items and telephone within reach. Instruct patient to call for assistance. Room bathroom lighting operational. Non-slip footwear when patient is off stretcher. Physically safe environment: no spills, clutter or unnecessary equipment. Stretcher in lowest position, wheels locked, appropriate side rails in place.

## 2018-09-10 NOTE — ED PROVIDER NOTE - MEDICAL DECISION MAKING DETAILS
Patient presents with atypical chest pain.  Recent CCTA done here shows minimal changes.  Clinically this is not cardiac.  ED work up, including d-dimer normal. Patient is a good candidate for continued outpatient work up.    Full DC instructions discussed and patient knows when to seek immediate medical attention.  Patient has proper follow up.  All results discussed and patient aware they may require further work up.  Proper follow up ensured. Limitations of ED work up discussed.  Medications administered and prescribed/OTC home meds discussed.  All questions and concerns from patient or family addressed. Understanding of instructions verbalized.

## 2018-09-10 NOTE — ED PROVIDER NOTE - NS ED ROS FT
Constitutional: See HPI.  Cardiac:+ chest pain; No SOB or edema. No chest pain with exertion.  Respiratory: No cough or respiratory distress.   GI: No nausea, vomiting, diarrhea or abdominal pain.  MS: No myalgia, muscle weakness, joint pain or back pain.  Neuro: No headache or weakness. No LOC.  Skin: No skin rash.  Endo: No hx of DM, thyroid disease  Except as documented in HPI, all other review of systems is negative

## 2018-09-10 NOTE — ED ADULT NURSE NOTE - OBJECTIVE STATEMENT
Pt with C.O chest pain on and off for 2-3 month ,radiates to the left arm on and off for 2-3 month worse from today .

## 2018-09-11 ENCOUNTER — OUTPATIENT (OUTPATIENT)
Dept: OUTPATIENT SERVICES | Facility: HOSPITAL | Age: 58
LOS: 1 days | Discharge: HOME | End: 2018-09-11

## 2018-09-11 ENCOUNTER — APPOINTMENT (OUTPATIENT)
Dept: PULMONOLOGY | Facility: CLINIC | Age: 58
End: 2018-09-11

## 2018-09-11 VITALS
SYSTOLIC BLOOD PRESSURE: 133 MMHG | OXYGEN SATURATION: 96 % | HEIGHT: 78 IN | HEART RATE: 55 BPM | WEIGHT: 189 LBS | BODY MASS INDEX: 21.87 KG/M2 | DIASTOLIC BLOOD PRESSURE: 83 MMHG

## 2018-09-13 ENCOUNTER — OUTPATIENT (OUTPATIENT)
Dept: OUTPATIENT SERVICES | Facility: HOSPITAL | Age: 58
LOS: 1 days | Discharge: HOME | End: 2018-09-13

## 2018-09-13 DIAGNOSIS — R91.8 OTHER NONSPECIFIC ABNORMAL FINDING OF LUNG FIELD: ICD-10-CM

## 2018-09-17 ENCOUNTER — OUTPATIENT (OUTPATIENT)
Dept: OUTPATIENT SERVICES | Facility: HOSPITAL | Age: 58
LOS: 1 days | Discharge: HOME | End: 2018-09-17

## 2018-09-17 DIAGNOSIS — R91.1 SOLITARY PULMONARY NODULE: ICD-10-CM

## 2018-09-17 LAB — GLUCOSE BLDC GLUCOMTR-MCNC: 104 MG/DL — HIGH (ref 70–99)

## 2018-09-20 ENCOUNTER — OUTPATIENT (OUTPATIENT)
Dept: OUTPATIENT SERVICES | Facility: HOSPITAL | Age: 58
LOS: 1 days | Discharge: HOME | End: 2018-09-20

## 2018-09-26 ENCOUNTER — LABORATORY RESULT (OUTPATIENT)
Age: 58
End: 2018-09-26

## 2018-09-26 ENCOUNTER — OUTPATIENT (OUTPATIENT)
Dept: OUTPATIENT SERVICES | Facility: HOSPITAL | Age: 58
LOS: 1 days | Discharge: HOME | End: 2018-09-26

## 2018-09-26 ENCOUNTER — APPOINTMENT (OUTPATIENT)
Dept: OTOLARYNGOLOGY | Facility: CLINIC | Age: 58
End: 2018-09-26
Payer: MEDICARE

## 2018-09-26 PROCEDURE — 99204 OFFICE O/P NEW MOD 45 MIN: CPT | Mod: 25

## 2018-09-26 PROCEDURE — 31575 DIAGNOSTIC LARYNGOSCOPY: CPT

## 2018-09-27 DIAGNOSIS — E04.9 NONTOXIC GOITER, UNSPECIFIED: ICD-10-CM

## 2018-10-09 ENCOUNTER — APPOINTMENT (OUTPATIENT)
Dept: PODIATRY | Facility: CLINIC | Age: 58
End: 2018-10-09

## 2018-10-10 ENCOUNTER — APPOINTMENT (OUTPATIENT)
Dept: OTOLARYNGOLOGY | Facility: CLINIC | Age: 58
End: 2018-10-10
Payer: MEDICARE

## 2018-10-10 PROCEDURE — 99215 OFFICE O/P EST HI 40 MIN: CPT

## 2018-10-16 ENCOUNTER — LABORATORY RESULT (OUTPATIENT)
Age: 58
End: 2018-10-16

## 2018-10-16 ENCOUNTER — OUTPATIENT (OUTPATIENT)
Dept: OUTPATIENT SERVICES | Facility: HOSPITAL | Age: 58
LOS: 1 days | Discharge: HOME | End: 2018-10-16

## 2018-10-16 ENCOUNTER — APPOINTMENT (OUTPATIENT)
Dept: PULMONOLOGY | Facility: CLINIC | Age: 58
End: 2018-10-16

## 2018-10-16 VITALS
WEIGHT: 189 LBS | HEART RATE: 60 BPM | DIASTOLIC BLOOD PRESSURE: 83 MMHG | HEIGHT: 72 IN | SYSTOLIC BLOOD PRESSURE: 127 MMHG | BODY MASS INDEX: 25.6 KG/M2 | OXYGEN SATURATION: 96 %

## 2018-10-16 DIAGNOSIS — Z80.8 FAMILY HISTORY OF MALIGNANT NEOPLASM OF OTHER ORGANS OR SYSTEMS: ICD-10-CM

## 2018-10-16 DIAGNOSIS — Z83.1 FAMILY HISTORY OF OTHER INFECTIOUS AND PARASITIC DISEASES: ICD-10-CM

## 2018-10-16 DIAGNOSIS — R91.1 SOLITARY PULMONARY NODULE: ICD-10-CM

## 2018-10-16 DIAGNOSIS — Z80.0 FAMILY HISTORY OF MALIGNANT NEOPLASM OF DIGESTIVE ORGANS: ICD-10-CM

## 2018-10-17 ENCOUNTER — RESULT CHARGE (OUTPATIENT)
Age: 58
End: 2018-10-17

## 2018-10-17 ENCOUNTER — APPOINTMENT (OUTPATIENT)
Dept: CARDIOLOGY | Facility: CLINIC | Age: 58
End: 2018-10-17

## 2018-10-17 VITALS
HEART RATE: 54 BPM | WEIGHT: 193 LBS | BODY MASS INDEX: 26.14 KG/M2 | DIASTOLIC BLOOD PRESSURE: 80 MMHG | SYSTOLIC BLOOD PRESSURE: 114 MMHG | HEIGHT: 72 IN

## 2018-10-17 RX ORDER — ALBUTEROL SULFATE 90 UG/1
108 (90 BASE) AEROSOL, METERED RESPIRATORY (INHALATION)
Qty: 1 | Refills: 3 | Status: COMPLETED | COMMUNITY
Start: 2018-10-16 | End: 2018-10-17

## 2018-10-29 ENCOUNTER — APPOINTMENT (OUTPATIENT)
Dept: PODIATRY | Facility: CLINIC | Age: 58
End: 2018-10-29
Payer: MEDICARE

## 2018-10-29 ENCOUNTER — OUTPATIENT (OUTPATIENT)
Dept: OUTPATIENT SERVICES | Facility: HOSPITAL | Age: 58
LOS: 1 days | Discharge: HOME | End: 2018-10-29

## 2018-10-29 VITALS
HEIGHT: 72 IN | BODY MASS INDEX: 26.01 KG/M2 | SYSTOLIC BLOOD PRESSURE: 117 MMHG | DIASTOLIC BLOOD PRESSURE: 75 MMHG | WEIGHT: 192 LBS

## 2018-10-29 PROCEDURE — 10060 I&D ABSCESS SIMPLE/SINGLE: CPT | Mod: 59

## 2018-10-29 PROCEDURE — 20600 DRAIN/INJ JOINT/BURSA W/O US: CPT | Mod: RT

## 2018-10-29 PROCEDURE — 11730 AVULSION NAIL PLATE SIMPLE 1: CPT | Mod: 59

## 2018-11-05 ENCOUNTER — LABORATORY RESULT (OUTPATIENT)
Age: 58
End: 2018-11-05

## 2018-11-05 ENCOUNTER — OUTPATIENT (OUTPATIENT)
Dept: OUTPATIENT SERVICES | Facility: HOSPITAL | Age: 58
LOS: 1 days | Discharge: HOME | End: 2018-11-05

## 2018-11-05 DIAGNOSIS — I25.10 ATHEROSCLEROTIC HEART DISEASE OF NATIVE CORONARY ARTERY WITHOUT ANGINA PECTORIS: ICD-10-CM

## 2018-11-05 LAB
CHOLEST SERPL-MCNC: 128 MG/DL
CHOLEST/HDLC SERPL: 2.4 RATIO
HDLC SERPL-MCNC: 53 MG/DL
LDLC SERPL CALC-MCNC: 62 MG/DL
TRIGL SERPL-MCNC: 100 MG/DL

## 2018-11-06 DIAGNOSIS — M79.671 PAIN IN RIGHT FOOT: ICD-10-CM

## 2018-11-06 DIAGNOSIS — M19.071 PRIMARY OSTEOARTHRITIS, RIGHT ANKLE AND FOOT: ICD-10-CM

## 2018-11-06 DIAGNOSIS — L60.0 INGROWING NAIL: ICD-10-CM

## 2018-11-06 DIAGNOSIS — L02.619 CUTANEOUS ABSCESS OF UNSPECIFIED FOOT: ICD-10-CM

## 2018-11-06 DIAGNOSIS — M79.672 PAIN IN LEFT FOOT: ICD-10-CM

## 2018-11-14 ENCOUNTER — FORM ENCOUNTER (OUTPATIENT)
Age: 58
End: 2018-11-14

## 2018-11-15 ENCOUNTER — OUTPATIENT (OUTPATIENT)
Dept: OUTPATIENT SERVICES | Facility: HOSPITAL | Age: 58
LOS: 1 days | Discharge: HOME | End: 2018-11-15

## 2018-11-15 DIAGNOSIS — R07.9 CHEST PAIN, UNSPECIFIED: ICD-10-CM

## 2018-11-15 DIAGNOSIS — E04.9 NONTOXIC GOITER, UNSPECIFIED: ICD-10-CM

## 2018-11-15 RX ORDER — REGADENOSON 0.08 MG/ML
0.4 INJECTION, SOLUTION INTRAVENOUS ONCE
Qty: 0 | Refills: 0 | Status: COMPLETED | OUTPATIENT
Start: 2018-11-15 | End: 2018-11-15

## 2018-11-15 RX ADMIN — REGADENOSON 0.4 MILLIGRAM(S): 0.08 INJECTION, SOLUTION INTRAVENOUS at 11:37

## 2018-11-29 ENCOUNTER — OUTPATIENT (OUTPATIENT)
Dept: OUTPATIENT SERVICES | Facility: HOSPITAL | Age: 58
LOS: 1 days | Discharge: HOME | End: 2018-11-29

## 2018-11-29 ENCOUNTER — APPOINTMENT (OUTPATIENT)
Dept: PODIATRY | Facility: CLINIC | Age: 58
End: 2018-11-29
Payer: MEDICARE

## 2018-11-29 VITALS
DIASTOLIC BLOOD PRESSURE: 92 MMHG | HEIGHT: 72 IN | HEART RATE: 90 BPM | BODY MASS INDEX: 26.01 KG/M2 | SYSTOLIC BLOOD PRESSURE: 164 MMHG | WEIGHT: 192 LBS

## 2018-11-29 PROCEDURE — 20605 DRAIN/INJ JOINT/BURSA W/O US: CPT

## 2018-12-04 ENCOUNTER — APPOINTMENT (OUTPATIENT)
Dept: DERMATOLOGY | Facility: CLINIC | Age: 58
End: 2018-12-04

## 2018-12-04 ENCOUNTER — OUTPATIENT (OUTPATIENT)
Dept: OUTPATIENT SERVICES | Facility: HOSPITAL | Age: 58
LOS: 1 days | Discharge: HOME | End: 2018-12-04

## 2018-12-04 VITALS
HEIGHT: 70 IN | HEART RATE: 74 BPM | SYSTOLIC BLOOD PRESSURE: 124 MMHG | TEMPERATURE: 97 F | WEIGHT: 194.01 LBS | OXYGEN SATURATION: 98 % | DIASTOLIC BLOOD PRESSURE: 81 MMHG | RESPIRATION RATE: 16 BRPM

## 2018-12-04 DIAGNOSIS — Z01.818 ENCOUNTER FOR OTHER PREPROCEDURAL EXAMINATION: ICD-10-CM

## 2018-12-04 DIAGNOSIS — E04.1 NONTOXIC SINGLE THYROID NODULE: ICD-10-CM

## 2018-12-04 DIAGNOSIS — E04.9 NONTOXIC GOITER, UNSPECIFIED: ICD-10-CM

## 2018-12-04 DIAGNOSIS — Z98.890 OTHER SPECIFIED POSTPROCEDURAL STATES: Chronic | ICD-10-CM

## 2018-12-04 DIAGNOSIS — Z96.643 PRESENCE OF ARTIFICIAL HIP JOINT, BILATERAL: Chronic | ICD-10-CM

## 2018-12-04 DIAGNOSIS — R91.8 OTHER NONSPECIFIC ABNORMAL FINDING OF LUNG FIELD: ICD-10-CM

## 2018-12-04 LAB
ALBUMIN SERPL ELPH-MCNC: 4.4 G/DL — SIGNIFICANT CHANGE UP (ref 3.5–5.2)
ALP SERPL-CCNC: 83 U/L — SIGNIFICANT CHANGE UP (ref 30–115)
ALT FLD-CCNC: 17 U/L — SIGNIFICANT CHANGE UP (ref 0–41)
ANION GAP SERPL CALC-SCNC: 13 MMOL/L — SIGNIFICANT CHANGE UP (ref 7–14)
APTT BLD: 33.4 SEC — SIGNIFICANT CHANGE UP (ref 27–39.2)
AST SERPL-CCNC: 19 U/L — SIGNIFICANT CHANGE UP (ref 0–41)
BASOPHILS # BLD AUTO: 0.05 K/UL — SIGNIFICANT CHANGE UP (ref 0–0.2)
BASOPHILS NFR BLD AUTO: 0.5 % — SIGNIFICANT CHANGE UP (ref 0–1)
BILIRUB SERPL-MCNC: 0.3 MG/DL — SIGNIFICANT CHANGE UP (ref 0.2–1.2)
BUN SERPL-MCNC: 10 MG/DL — SIGNIFICANT CHANGE UP (ref 10–20)
CALCIUM SERPL-MCNC: 10.2 MG/DL — HIGH (ref 8.5–10.1)
CHLORIDE SERPL-SCNC: 103 MMOL/L — SIGNIFICANT CHANGE UP (ref 98–110)
CO2 SERPL-SCNC: 29 MMOL/L — SIGNIFICANT CHANGE UP (ref 17–32)
CREAT SERPL-MCNC: 0.9 MG/DL — SIGNIFICANT CHANGE UP (ref 0.7–1.5)
EOSINOPHIL # BLD AUTO: 0.15 K/UL — SIGNIFICANT CHANGE UP (ref 0–0.7)
EOSINOPHIL NFR BLD AUTO: 1.5 % — SIGNIFICANT CHANGE UP (ref 0–8)
GLUCOSE SERPL-MCNC: 80 MG/DL — SIGNIFICANT CHANGE UP (ref 70–99)
HCT VFR BLD CALC: 48.9 % — SIGNIFICANT CHANGE UP (ref 42–52)
HGB BLD-MCNC: 16.8 G/DL — SIGNIFICANT CHANGE UP (ref 14–18)
IMM GRANULOCYTES NFR BLD AUTO: 0.2 % — SIGNIFICANT CHANGE UP (ref 0.1–0.3)
INR BLD: 0.81 RATIO — SIGNIFICANT CHANGE UP (ref 0.65–1.3)
LYMPHOCYTES # BLD AUTO: 3.36 K/UL — SIGNIFICANT CHANGE UP (ref 1.2–3.4)
LYMPHOCYTES # BLD AUTO: 33.6 % — SIGNIFICANT CHANGE UP (ref 20.5–51.1)
MCHC RBC-ENTMCNC: 32.9 PG — HIGH (ref 27–31)
MCHC RBC-ENTMCNC: 34.4 G/DL — SIGNIFICANT CHANGE UP (ref 32–37)
MCV RBC AUTO: 95.7 FL — HIGH (ref 80–94)
MONOCYTES # BLD AUTO: 0.58 K/UL — SIGNIFICANT CHANGE UP (ref 0.1–0.6)
MONOCYTES NFR BLD AUTO: 5.8 % — SIGNIFICANT CHANGE UP (ref 1.7–9.3)
NEUTROPHILS # BLD AUTO: 5.84 K/UL — SIGNIFICANT CHANGE UP (ref 1.4–6.5)
NEUTROPHILS NFR BLD AUTO: 58.4 % — SIGNIFICANT CHANGE UP (ref 42.2–75.2)
NRBC # BLD: 0 /100 WBCS — SIGNIFICANT CHANGE UP (ref 0–0)
PLATELET # BLD AUTO: 181 K/UL — SIGNIFICANT CHANGE UP (ref 130–400)
POTASSIUM SERPL-MCNC: 4.5 MMOL/L — SIGNIFICANT CHANGE UP (ref 3.5–5)
POTASSIUM SERPL-SCNC: 4.5 MMOL/L — SIGNIFICANT CHANGE UP (ref 3.5–5)
PROT SERPL-MCNC: 7.1 G/DL — SIGNIFICANT CHANGE UP (ref 6–8)
PROTHROM AB SERPL-ACNC: 9.3 SEC — LOW (ref 9.95–12.87)
RBC # BLD: 5.11 M/UL — SIGNIFICANT CHANGE UP (ref 4.7–6.1)
RBC # FLD: 13.7 % — SIGNIFICANT CHANGE UP (ref 11.5–14.5)
SODIUM SERPL-SCNC: 145 MMOL/L — SIGNIFICANT CHANGE UP (ref 135–146)
WBC # BLD: 10 K/UL — SIGNIFICANT CHANGE UP (ref 4.8–10.8)
WBC # FLD AUTO: 10 K/UL — SIGNIFICANT CHANGE UP (ref 4.8–10.8)

## 2018-12-04 RX ORDER — METHOCARBAMOL 500 MG/1
2 TABLET, FILM COATED ORAL
Qty: 0 | Refills: 0 | COMMUNITY

## 2018-12-04 RX ORDER — GABAPENTIN 400 MG/1
0 CAPSULE ORAL
Qty: 0 | Refills: 0 | COMMUNITY

## 2018-12-04 NOTE — H&P PST ADULT - PMH
Anxiety    Back pain    Emphysema/COPD  "mild" per pt  Gallstones    Goiter    HLD (hyperlipidemia)    HTN (hypertension)    OA (osteoarthritis)

## 2018-12-04 NOTE — H&P PST ADULT - REASON FOR ADMISSION
59 yo male presents with "enlarged thyroid, I have a benign goiter& nodule" pt is scheduled for total thyroidectomy;  denies chest pain, palpitations, shortness of breath, dyspnea, or dysuria. exercise tolerance: 2+ blocks/ flights of stairs w/o sob  per pt gets occasional chest pain per pt "I think it is stress, but I am seeing the cardiologist tomorrow I had a stress test"

## 2018-12-05 ENCOUNTER — APPOINTMENT (OUTPATIENT)
Dept: OTOLARYNGOLOGY | Facility: CLINIC | Age: 58
End: 2018-12-05
Payer: MEDICARE

## 2018-12-05 ENCOUNTER — APPOINTMENT (OUTPATIENT)
Dept: CARDIOLOGY | Facility: CLINIC | Age: 58
End: 2018-12-05

## 2018-12-05 VITALS
SYSTOLIC BLOOD PRESSURE: 142 MMHG | HEART RATE: 59 BPM | DIASTOLIC BLOOD PRESSURE: 92 MMHG | BODY MASS INDEX: 26.04 KG/M2 | HEIGHT: 72 IN

## 2018-12-05 VITALS
SYSTOLIC BLOOD PRESSURE: 133 MMHG | HEIGHT: 72 IN | DIASTOLIC BLOOD PRESSURE: 91 MMHG | WEIGHT: 192 LBS | BODY MASS INDEX: 26.01 KG/M2

## 2018-12-05 VITALS — DIASTOLIC BLOOD PRESSURE: 90 MMHG | SYSTOLIC BLOOD PRESSURE: 130 MMHG

## 2018-12-05 DIAGNOSIS — H54.7 UNSPECIFIED VISUAL LOSS: ICD-10-CM

## 2018-12-05 PROCEDURE — 99213 OFFICE O/P EST LOW 20 MIN: CPT

## 2018-12-05 NOTE — ASSESSMENT
[FreeTextEntry1] : 57 yo M with h/o DL, HTN,  pulmonary nodules (likely benign, following with Dr. Olivo, repeat CT chest 12/'18),  L thyroid mass (not cancerous on biopsy but needs removal),. The patient is going for total thyroidectomy on 12-17-18 . . The patient states he has had no further chest pain Lexiscan stress test was negative for ischemia. He has more than 4 METS exercise tolerance and has nonobstructive CAD on CTA of the coronary arteries . The patient is an intermediate cardiac risk undergoing an intermediate risk procedure. \par \par

## 2018-12-05 NOTE — REASON FOR VISIT
[Initial Evaluation] : an initial evaluation of [Abnormal Test Result] : an abnormal test result [Coronary Artery Disease] : coronary artery disease

## 2018-12-05 NOTE — PHYSICAL EXAM
[General Appearance - Well Developed] : well developed [Normal Appearance] : normal appearance [Well Groomed] : well groomed [General Appearance - Well Nourished] : well nourished [No Deformities] : no deformities [General Appearance - In No Acute Distress] : no acute distress [Normal Conjunctiva] : the conjunctiva exhibited no abnormalities [Eyelids - No Xanthelasma] : the eyelids demonstrated no xanthelasmas [Respiration, Rhythm And Depth] : normal respiratory rhythm and effort [Exaggerated Use Of Accessory Muscles For Inspiration] : no accessory muscle use [Auscultation Breath Sounds / Voice Sounds] : lungs were clear to auscultation bilaterally [Abdomen Soft] : soft [Abdomen Tenderness] : non-tender [Abdomen Mass (___ Cm)] : no abdominal mass palpated [Abnormal Walk] : normal gait [Skin Color & Pigmentation] : normal skin color and pigmentation [] : no rash [No Venous Stasis] : no venous stasis [Skin Lesions] : no skin lesions [No Skin Ulcers] : no skin ulcer [No Xanthoma] : no  xanthoma was observed [Oriented To Time, Place, And Person] : oriented to person, place, and time [Affect] : the affect was normal [Mood] : the mood was normal [No Anxiety] : not feeling anxious [Normal Rate] : normal [Rhythm Regular] : regular [Click] : a ~M click was heard [No Murmur] : no murmurs heard [FreeTextEntry1] : No JVD

## 2018-12-06 ENCOUNTER — OUTPATIENT (OUTPATIENT)
Dept: OUTPATIENT SERVICES | Facility: HOSPITAL | Age: 58
LOS: 1 days | Discharge: HOME | End: 2018-12-06

## 2018-12-06 ENCOUNTER — APPOINTMENT (OUTPATIENT)
Dept: INTERNAL MEDICINE | Facility: CLINIC | Age: 58
End: 2018-12-06

## 2018-12-06 VITALS
DIASTOLIC BLOOD PRESSURE: 89 MMHG | BODY MASS INDEX: 26.82 KG/M2 | SYSTOLIC BLOOD PRESSURE: 140 MMHG | WEIGHT: 198 LBS | HEIGHT: 72 IN | HEART RATE: 63 BPM

## 2018-12-06 DIAGNOSIS — Z98.890 OTHER SPECIFIED POSTPROCEDURAL STATES: Chronic | ICD-10-CM

## 2018-12-06 DIAGNOSIS — Z96.643 PRESENCE OF ARTIFICIAL HIP JOINT, BILATERAL: Chronic | ICD-10-CM

## 2018-12-06 DIAGNOSIS — L02.619 CUTANEOUS ABSCESS OF UNSPECIFIED FOOT: ICD-10-CM

## 2018-12-06 DIAGNOSIS — I10 ESSENTIAL (PRIMARY) HYPERTENSION: ICD-10-CM

## 2018-12-06 DIAGNOSIS — R91.8 OTHER NONSPECIFIC ABNORMAL FINDING OF LUNG FIELD: ICD-10-CM

## 2018-12-06 DIAGNOSIS — E66.3 OVERWEIGHT: ICD-10-CM

## 2018-12-06 DIAGNOSIS — R22.1 LOCALIZED SWELLING, MASS AND LUMP, NECK: ICD-10-CM

## 2018-12-06 DIAGNOSIS — Z01.818 ENCOUNTER FOR OTHER PREPROCEDURAL EXAMINATION: ICD-10-CM

## 2018-12-06 DIAGNOSIS — Z87.898 PERSONAL HISTORY OF OTHER SPECIFIED CONDITIONS: ICD-10-CM

## 2018-12-06 DIAGNOSIS — E78.00 PURE HYPERCHOLESTEROLEMIA, UNSPECIFIED: ICD-10-CM

## 2018-12-06 DIAGNOSIS — Z02.9 ENCOUNTER FOR ADMINISTRATIVE EXAMINATIONS, UNSPECIFIED: ICD-10-CM

## 2018-12-06 PROBLEM — M19.90 UNSPECIFIED OSTEOARTHRITIS, UNSPECIFIED SITE: Chronic | Status: ACTIVE | Noted: 2018-12-04

## 2018-12-06 PROBLEM — K80.20 CALCULUS OF GALLBLADDER WITHOUT CHOLECYSTITIS WITHOUT OBSTRUCTION: Chronic | Status: ACTIVE | Noted: 2018-12-04

## 2018-12-06 PROBLEM — J43.9 EMPHYSEMA, UNSPECIFIED: Chronic | Status: ACTIVE | Noted: 2018-12-04

## 2018-12-06 PROBLEM — M54.9 DORSALGIA, UNSPECIFIED: Chronic | Status: ACTIVE | Noted: 2018-12-04

## 2018-12-06 PROBLEM — E04.9 NONTOXIC GOITER, UNSPECIFIED: Chronic | Status: ACTIVE | Noted: 2018-12-04

## 2018-12-06 PROBLEM — F41.9 ANXIETY DISORDER, UNSPECIFIED: Chronic | Status: ACTIVE | Noted: 2018-12-04

## 2018-12-06 RX ORDER — MELOXICAM 15 MG/1
15 TABLET ORAL
Qty: 14 | Refills: 0 | Status: DISCONTINUED | COMMUNITY
Start: 2018-06-05 | End: 2018-12-06

## 2018-12-06 NOTE — HISTORY OF PRESENT ILLNESS
[de-identified] : 58 yom with pmh of htn, pulmonary nodules, thyroid mass s/p FNA showing benign follicular cells, smoking presents for pre-op clearance; pt is going for hemithyroidectomy by ENT on 12/17; pt denies sob, chest pain, orthopnea; pt was seen by cardio and was cleared for surgery

## 2018-12-06 NOTE — PHYSICAL EXAM
[No Acute Distress] : no acute distress [Well Nourished] : well nourished [Well Developed] : well developed [Well-Appearing] : well-appearing [No JVD] : no jugular venous distention [No Respiratory Distress] : no respiratory distress  [Clear to Auscultation] : lungs were clear to auscultation bilaterally [No Accessory Muscle Use] : no accessory muscle use [Regular Rhythm] : with a regular rhythm [Normal S1, S2] : normal S1 and S2 [No Murmur] : no murmur heard [No Edema] : there was no peripheral edema [Soft] : abdomen soft [Non Tender] : non-tender [Non-distended] : non-distended [No HSM] : no HSM [No Spinal Tenderness] : no spinal tenderness [No Joint Swelling] : no joint swelling [No Rash] : no rash [Normal Gait] : normal gait [No Focal Deficits] : no focal deficits [Normal Affect] : the affect was normal [Normal Insight/Judgement] : insight and judgment were intact

## 2018-12-06 NOTE — ASSESSMENT
[FreeTextEntry1] : 58 yom with pmh of htn, pulmonary nodules, thyroid mass s/p FNA showing benign follicular cells, smoking presents for pre-op clearance; pt is going for hemithyroidectomy by ENT on 12/17; pt denies sob, chest pain, orthopnea; pt was seen by cardio and was cleared for surgery\par \par # Thyroid mass- benign follicular cell\par - pt denies chest pain, sob; pt was cleared by cardiology; pt had a stress test in 11/18 which was negative\par - pt is intermediate risk for intermediate risk procedure\par - pt going for hemithyroidectomy\par \par #  lung nodules\par - pt follows with pulm\par \par # HTN \par - c/w norvasc, aspirin\par \par # DLD\par - c/w statin\par # chronic lbp, joint pain\par - c/w meloxicam, robaxin prn\par \par # Low back pain/ foot OA\par - c/w robaxin prn; c/w gabapentin;\par - c/w voltaren gel\par \par # HCM\par - colonoscopy done 2010?\par - pneumococcal vaccine at 65\par - flu vaccine utd

## 2018-12-10 ENCOUNTER — APPOINTMENT (OUTPATIENT)
Dept: CARDIOLOGY | Facility: CLINIC | Age: 58
End: 2018-12-10

## 2018-12-17 ENCOUNTER — APPOINTMENT (OUTPATIENT)
Dept: OTOLARYNGOLOGY | Facility: HOSPITAL | Age: 58
End: 2018-12-17
Payer: MEDICARE

## 2018-12-17 ENCOUNTER — RESULT REVIEW (OUTPATIENT)
Age: 58
End: 2018-12-17

## 2018-12-17 ENCOUNTER — INPATIENT (INPATIENT)
Facility: HOSPITAL | Age: 58
LOS: 0 days | Discharge: HOME | End: 2018-12-18
Attending: OTOLARYNGOLOGY | Admitting: OTOLARYNGOLOGY

## 2018-12-17 ENCOUNTER — OUTPATIENT (OUTPATIENT)
Dept: OUTPATIENT SERVICES | Facility: HOSPITAL | Age: 58
LOS: 1 days | Discharge: HOME | End: 2018-12-17

## 2018-12-17 VITALS
WEIGHT: 190.04 LBS | SYSTOLIC BLOOD PRESSURE: 128 MMHG | RESPIRATION RATE: 18 BRPM | TEMPERATURE: 98 F | HEART RATE: 62 BPM | HEIGHT: 70.5 IN | DIASTOLIC BLOOD PRESSURE: 78 MMHG

## 2018-12-17 DIAGNOSIS — Z98.890 OTHER SPECIFIED POSTPROCEDURAL STATES: Chronic | ICD-10-CM

## 2018-12-17 DIAGNOSIS — Z96.643 PRESENCE OF ARTIFICIAL HIP JOINT, BILATERAL: Chronic | ICD-10-CM

## 2018-12-17 PROCEDURE — 60240 REMOVAL OF THYROID: CPT

## 2018-12-17 RX ORDER — MORPHINE SULFATE 50 MG/1
4 CAPSULE, EXTENDED RELEASE ORAL
Qty: 0 | Refills: 0 | Status: DISCONTINUED | OUTPATIENT
Start: 2018-12-17 | End: 2018-12-17

## 2018-12-17 RX ORDER — OXYCODONE AND ACETAMINOPHEN 5; 325 MG/1; MG/1
1 TABLET ORAL ONCE
Qty: 0 | Refills: 0 | Status: DISCONTINUED | OUTPATIENT
Start: 2018-12-17 | End: 2018-12-17

## 2018-12-17 RX ORDER — SODIUM CHLORIDE 9 MG/ML
1000 INJECTION, SOLUTION INTRAVENOUS
Qty: 0 | Refills: 0 | Status: DISCONTINUED | OUTPATIENT
Start: 2018-12-17 | End: 2018-12-17

## 2018-12-17 RX ORDER — ATORVASTATIN CALCIUM 80 MG/1
40 TABLET, FILM COATED ORAL AT BEDTIME
Qty: 0 | Refills: 0 | Status: DISCONTINUED | OUTPATIENT
Start: 2018-12-17 | End: 2018-12-18

## 2018-12-17 RX ORDER — OXYCODONE AND ACETAMINOPHEN 5; 325 MG/1; MG/1
1 TABLET ORAL EVERY 4 HOURS
Qty: 0 | Refills: 0 | Status: DISCONTINUED | OUTPATIENT
Start: 2018-12-17 | End: 2018-12-18

## 2018-12-17 RX ORDER — ONDANSETRON 8 MG/1
4 TABLET, FILM COATED ORAL EVERY 8 HOURS
Qty: 0 | Refills: 0 | Status: DISCONTINUED | OUTPATIENT
Start: 2018-12-17 | End: 2018-12-18

## 2018-12-17 RX ORDER — METHOCARBAMOL 500 MG/1
750 TABLET, FILM COATED ORAL DAILY
Qty: 0 | Refills: 0 | Status: DISCONTINUED | OUTPATIENT
Start: 2018-12-17 | End: 2018-12-18

## 2018-12-17 RX ORDER — GABAPENTIN 400 MG/1
100 CAPSULE ORAL
Qty: 0 | Refills: 0 | Status: DISCONTINUED | OUTPATIENT
Start: 2018-12-17 | End: 2018-12-18

## 2018-12-17 RX ORDER — SODIUM CHLORIDE 9 MG/ML
3 INJECTION INTRAMUSCULAR; INTRAVENOUS; SUBCUTANEOUS EVERY 8 HOURS
Qty: 0 | Refills: 0 | Status: DISCONTINUED | OUTPATIENT
Start: 2018-12-17 | End: 2018-12-18

## 2018-12-17 RX ORDER — ACETAMINOPHEN 500 MG
650 TABLET ORAL EVERY 4 HOURS
Qty: 0 | Refills: 0 | Status: DISCONTINUED | OUTPATIENT
Start: 2018-12-17 | End: 2018-12-18

## 2018-12-17 RX ORDER — MORPHINE SULFATE 50 MG/1
2 CAPSULE, EXTENDED RELEASE ORAL EVERY 4 HOURS
Qty: 0 | Refills: 0 | Status: DISCONTINUED | OUTPATIENT
Start: 2018-12-17 | End: 2018-12-18

## 2018-12-17 RX ORDER — ALBUTEROL 90 UG/1
2 AEROSOL, METERED ORAL EVERY 6 HOURS
Qty: 0 | Refills: 0 | Status: DISCONTINUED | OUTPATIENT
Start: 2018-12-17 | End: 2018-12-18

## 2018-12-17 RX ORDER — AMLODIPINE BESYLATE 2.5 MG/1
5 TABLET ORAL DAILY
Qty: 0 | Refills: 0 | Status: DISCONTINUED | OUTPATIENT
Start: 2018-12-17 | End: 2018-12-18

## 2018-12-17 RX ORDER — ONDANSETRON 8 MG/1
4 TABLET, FILM COATED ORAL ONCE
Qty: 0 | Refills: 0 | Status: DISCONTINUED | OUTPATIENT
Start: 2018-12-17 | End: 2018-12-17

## 2018-12-17 RX ADMIN — OXYCODONE AND ACETAMINOPHEN 1 TABLET(S): 5; 325 TABLET ORAL at 19:40

## 2018-12-17 RX ADMIN — SODIUM CHLORIDE 100 MILLILITER(S): 9 INJECTION, SOLUTION INTRAVENOUS at 15:20

## 2018-12-17 RX ADMIN — OXYCODONE AND ACETAMINOPHEN 1 TABLET(S): 5; 325 TABLET ORAL at 23:52

## 2018-12-17 RX ADMIN — ATORVASTATIN CALCIUM 40 MILLIGRAM(S): 80 TABLET, FILM COATED ORAL at 21:11

## 2018-12-17 RX ADMIN — MORPHINE SULFATE 2 MILLIGRAM(S): 50 CAPSULE, EXTENDED RELEASE ORAL at 22:00

## 2018-12-17 RX ADMIN — MORPHINE SULFATE 4 MILLIGRAM(S): 50 CAPSULE, EXTENDED RELEASE ORAL at 16:14

## 2018-12-17 RX ADMIN — GABAPENTIN 100 MILLIGRAM(S): 400 CAPSULE ORAL at 23:54

## 2018-12-17 RX ADMIN — SODIUM CHLORIDE 3 MILLILITER(S): 9 INJECTION INTRAMUSCULAR; INTRAVENOUS; SUBCUTANEOUS at 21:06

## 2018-12-17 RX ADMIN — GABAPENTIN 100 MILLIGRAM(S): 400 CAPSULE ORAL at 18:27

## 2018-12-17 RX ADMIN — MORPHINE SULFATE 4 MILLIGRAM(S): 50 CAPSULE, EXTENDED RELEASE ORAL at 15:59

## 2018-12-17 RX ADMIN — OXYCODONE AND ACETAMINOPHEN 1 TABLET(S): 5; 325 TABLET ORAL at 20:30

## 2018-12-17 RX ADMIN — MORPHINE SULFATE 2 MILLIGRAM(S): 50 CAPSULE, EXTENDED RELEASE ORAL at 21:13

## 2018-12-17 NOTE — CHART NOTE - NSCHARTNOTEFT_GEN_A_CORE
PACU ANESTHESIA ADMISSION NOTE      Procedure: Thyroid lobectomy, partial, left, with isthmusectomy    Post op diagnosis:  Thyroid nodule      ____  Intubated  TV:______       Rate: ______      FiO2: ______    _x___  Patent Airway    _x___  Full return of protective reflexes    _x___  Full recovery from anesthesia / back to baseline status    Vitals:            T:  98.1              BP :  121/78              R: 14             Sat: 96              P:80      Mental Status:  _x___ Awake   _____ Alert   _____ Drowsy   _____ Sedated    Nausea/Vomiting:  _x___  NO       ______Yes,   See Post - Op Orders         Pain Scale (0-10):  __0___    Treatment: _x___ None    ____ See Post - Op/PCA Orders    Post - Operative Fluids:   __x__ Oral   ____ See Post - Op Orders    Plan: Discharge:   _x___Home       _____Floor     _____Critical Care    _____  Other:_________________    Comments:  No anesthesia issues or complications noted.  Discharge when criteria met.

## 2018-12-17 NOTE — BRIEF OPERATIVE NOTE - PROCEDURE
<<-----Click on this checkbox to enter Procedure Thyroid lobectomy, partial, left, with isthmusectomy  12/17/2018    Active  [id number]

## 2018-12-17 NOTE — PROGRESS NOTE ADULT - SUBJECTIVE AND OBJECTIVE BOX
PT is a 58 y.o male s/p L hemithyroidectomy, POD #0, seen and examined at bedside. PT doing well, pain better controlled after Morphine given. PT able to tolerate minimal liquid PO. Pt denies any SOB/diff breathing.    Vital Signs: T(F): 97.8 (17 Dec 2018 17:21), Max: 98.5 (17 Dec 2018 09:28), HR: 64, BP: 121/70, RR: 14, SpO2: 96%  GEN: NAD, awake and alert  HEENT: incision C/D/I, no erythema/edema, no hematoma palpated. mild TTP over the area. + hemovac in place draining serosang fluid.

## 2018-12-17 NOTE — PRE-ANESTHESIA EVALUATION ADULT - NSANTHOSAYNRD_GEN_A_CORE
never tested, see screening tool/No. AGUS screening performed.  STOP BANG Legend: 0-2 = LOW Risk; 3-4 = INTERMEDIATE Risk; 5-8 = HIGH Risk

## 2018-12-17 NOTE — PROGRESS NOTE ADULT - ASSESSMENT
58 y.o male s/p L hemithyroidectomy, POD #0.    ·	cont pain control  ·	full liquid, soft diet as tolerated  ·	cont monitoring drain output q shift  ·	anticipate D/C home in AM  ·	w/d with attng

## 2018-12-18 ENCOUNTER — TRANSCRIPTION ENCOUNTER (OUTPATIENT)
Age: 58
End: 2018-12-18

## 2018-12-18 VITALS
SYSTOLIC BLOOD PRESSURE: 115 MMHG | DIASTOLIC BLOOD PRESSURE: 74 MMHG | RESPIRATION RATE: 18 BRPM | TEMPERATURE: 98 F | HEART RATE: 63 BPM

## 2018-12-18 RX ORDER — ACETAMINOPHEN 500 MG
2 TABLET ORAL
Qty: 0 | Refills: 0 | DISCHARGE
Start: 2018-12-18

## 2018-12-18 RX ORDER — MELOXICAM 15 MG/1
1 TABLET ORAL
Qty: 0 | Refills: 0 | COMMUNITY

## 2018-12-18 RX ORDER — ASPIRIN/CALCIUM CARB/MAGNESIUM 324 MG
1 TABLET ORAL
Qty: 0 | Refills: 0 | COMMUNITY

## 2018-12-18 RX ADMIN — SODIUM CHLORIDE 3 MILLILITER(S): 9 INJECTION INTRAMUSCULAR; INTRAVENOUS; SUBCUTANEOUS at 07:43

## 2018-12-18 RX ADMIN — Medication 650 MILLIGRAM(S): at 13:58

## 2018-12-18 RX ADMIN — OXYCODONE AND ACETAMINOPHEN 1 TABLET(S): 5; 325 TABLET ORAL at 03:59

## 2018-12-18 RX ADMIN — GABAPENTIN 100 MILLIGRAM(S): 400 CAPSULE ORAL at 11:31

## 2018-12-18 RX ADMIN — ONDANSETRON 4 MILLIGRAM(S): 8 TABLET, FILM COATED ORAL at 08:53

## 2018-12-18 RX ADMIN — Medication 650 MILLIGRAM(S): at 09:29

## 2018-12-18 RX ADMIN — GABAPENTIN 100 MILLIGRAM(S): 400 CAPSULE ORAL at 05:12

## 2018-12-18 RX ADMIN — MORPHINE SULFATE 2 MILLIGRAM(S): 50 CAPSULE, EXTENDED RELEASE ORAL at 01:17

## 2018-12-18 RX ADMIN — AMLODIPINE BESYLATE 5 MILLIGRAM(S): 2.5 TABLET ORAL at 05:12

## 2018-12-18 RX ADMIN — METHOCARBAMOL 750 MILLIGRAM(S): 500 TABLET, FILM COATED ORAL at 11:32

## 2018-12-18 RX ADMIN — Medication 650 MILLIGRAM(S): at 08:54

## 2018-12-18 NOTE — PROGRESS NOTE ADULT - ASSESSMENT
58 y.o male with hemithyroidectomy, POD #1, doing well post op.    ·	percocet sent to pharmacy  ·	drain removed  ·	ok for D/C home now  ·	Dr Woods at bedside.

## 2018-12-18 NOTE — DISCHARGE NOTE ADULT - PATIENT PORTAL LINK FT
You can access the [facility] Patient Portal, offered by [facility], by registering with the following website: [email or web address]

## 2018-12-18 NOTE — DISCHARGE NOTE ADULT - MEDICATION SUMMARY - MEDICATIONS TO TAKE
I will START or STAY ON the medications listed below when I get home from the hospital:    acetaminophen 325 mg oral tablet  -- 2 tab(s) by mouth every 4 hours, As needed, Temp greater or equal to 38C (100.4F), Mild Pain (1 - 3)  -- Indication: For pain    oxycodone-acetaminophen 5 mg-325 mg oral tablet  -- 1 tab(s) by mouth every 4 hours, As needed, Moderate Pain (4 - 6) MDD:6  -- Indication: For pain    gabapentin 100 mg oral tablet  -- orally 4 times a day  -- Indication: For neurovascular pain    rosuvastatin 10 mg oral tablet  -- 1 tab(s) by mouth once a day (at bedtime)  -- Indication: For high cholesterol    albuterol 90 mcg/inh inhalation aerosol  -- 2 puff(s) inhaled 4 times a day, As Needed  -- For inhalation only.  It is very important that you take or use this exactly as directed.  Do not skip doses or discontinue unless directed by your doctor.  Obtain medical advice before taking any non-prescription drugs as some may affect the action of this medication.  Shake well before use.    -- Indication: For bronchodilator    amLODIPine 5 mg oral tablet  -- 1 tab(s) by mouth once a day  -- Indication: For high blood pressure    methocarbamol 750 mg oral tablet  -- tab(s) by mouth once a day  -- Indication: For muscle relaxant

## 2018-12-18 NOTE — ANESTHESIA FOLLOW-UP NOTE - NSEVALATION_GEN_ALL_CORE
No apparent complications or complaints regarding anesthesia care at this time All questions were answered/No apparent complications or complaints regarding anesthesia care at this time

## 2018-12-18 NOTE — PROGRESS NOTE ADULT - SUBJECTIVE AND OBJECTIVE BOX
Pt is a 58 y.o male s/p laisha thyroidectomy, POD #1, seen and examined at bedside - doing well. PT doing well, had some nausea last night and this AM. Pt was able to tolerate PO diet without vomiting this AM. Pt overall feeling well this AM, better then last night in terms of pain as well.    Vital Signs: T(F): 98.1 (18 Dec 2018 07:23), Max: 99.8 (18 Dec 2018 04:00), HR: 63, BP: 115/74, RR: 18, SpO2: 98%   GEN: NAD, awake and alert.   HEENT: + incision C/D/I, no erythema/edema, no hematoma palpated. area soft, no ecchymosis

## 2018-12-18 NOTE — DISCHARGE NOTE ADULT - CARE PROVIDER_API CALL
Hernesto Woods), Otolaryngology  [address] [place] [address]  Phone: [phone]  Fax: [phone]

## 2018-12-18 NOTE — DISCHARGE NOTE ADULT - CARE PLAN
Principal Discharge DX:	Goiter  Goal:	removal of the Left side of your thyroid  Assessment and plan of treatment:	follow up as outpatient

## 2018-12-24 LAB — SURGICAL PATHOLOGY STUDY: SIGNIFICANT CHANGE UP

## 2018-12-26 DIAGNOSIS — E04.1 NONTOXIC SINGLE THYROID NODULE: ICD-10-CM

## 2018-12-26 DIAGNOSIS — Z88.0 ALLERGY STATUS TO PENICILLIN: ICD-10-CM

## 2018-12-27 ENCOUNTER — APPOINTMENT (OUTPATIENT)
Dept: OTOLARYNGOLOGY | Facility: CLINIC | Age: 58
End: 2018-12-27
Payer: MEDICARE

## 2018-12-27 PROCEDURE — 99024 POSTOP FOLLOW-UP VISIT: CPT

## 2019-01-03 ENCOUNTER — APPOINTMENT (OUTPATIENT)
Dept: OTOLARYNGOLOGY | Facility: CLINIC | Age: 59
End: 2019-01-03
Payer: MEDICARE

## 2019-01-03 PROCEDURE — 99024 POSTOP FOLLOW-UP VISIT: CPT

## 2019-01-08 ENCOUNTER — APPOINTMENT (OUTPATIENT)
Dept: PULMONOLOGY | Facility: CLINIC | Age: 59
End: 2019-01-08

## 2019-01-08 ENCOUNTER — OUTPATIENT (OUTPATIENT)
Dept: OUTPATIENT SERVICES | Facility: HOSPITAL | Age: 59
LOS: 1 days | Discharge: HOME | End: 2019-01-08

## 2019-01-08 VITALS
DIASTOLIC BLOOD PRESSURE: 67 MMHG | BODY MASS INDEX: 26.68 KG/M2 | OXYGEN SATURATION: 97 % | HEART RATE: 83 BPM | HEIGHT: 72 IN | WEIGHT: 197 LBS | SYSTOLIC BLOOD PRESSURE: 115 MMHG

## 2019-01-08 DIAGNOSIS — Z96.643 PRESENCE OF ARTIFICIAL HIP JOINT, BILATERAL: Chronic | ICD-10-CM

## 2019-01-08 DIAGNOSIS — Z98.890 OTHER SPECIFIED POSTPROCEDURAL STATES: Chronic | ICD-10-CM

## 2019-01-08 NOTE — PHYSICAL EXAM
[General Appearance - Well Developed] : well developed [Normal Appearance] : normal appearance [Well Groomed] : well groomed [General Appearance - Well Nourished] : well nourished [No Deformities] : no deformities [General Appearance - In No Acute Distress] : no acute distress [Normal Conjunctiva] : the conjunctiva exhibited no abnormalities [Eyelids - No Xanthelasma] : the eyelids demonstrated no xanthelasmas [Normal Oropharynx] : normal oropharynx [Neck Appearance] : the appearance of the neck was normal [Heart Rate And Rhythm] : heart rate and rhythm were normal [Heart Sounds] : normal S1 and S2 [Murmurs] : no murmurs present [Respiration, Rhythm And Depth] : normal respiratory rhythm and effort [Exaggerated Use Of Accessory Muscles For Inspiration] : no accessory muscle use [Auscultation Breath Sounds / Voice Sounds] : lungs were clear to auscultation bilaterally [Abdomen Soft] : soft [Abdomen Tenderness] : non-tender [Abdomen Mass (___ Cm)] : no abdominal mass palpated [Abnormal Walk] : normal gait [Gait - Sufficient For Exercise Testing] : the gait was sufficient for exercise testing [Nail Clubbing] : no clubbing of the fingernails [Cyanosis, Localized] : no localized cyanosis [Petechial Hemorrhages (___cm)] : no petechial hemorrhages [Skin Color & Pigmentation] : normal skin color and pigmentation [] : no rash [No Venous Stasis] : no venous stasis [Skin Lesions] : no skin lesions [No Skin Ulcers] : no skin ulcer [No Xanthoma] : no  xanthoma was observed [Deep Tendon Reflexes (DTR)] : deep tendon reflexes were 2+ and symmetric [Sensation] : the sensory exam was normal to light touch and pinprick [No Focal Deficits] : no focal deficits [Oriented To Time, Place, And Person] : oriented to person, place, and time [Impaired Insight] : insight and judgment were intact [Affect] : the affect was normal [Arterial Pulses Normal] : the arterial pulses were normal [Edema] : no peripheral edema present [Chest Palpation] : palpation of the chest revealed no abnormalities [Bowel Sounds] : normal bowel sounds [Nail Splinter Hemorrhages] : no splinter hemorrhages of the nails [Fingers Osler's Nodes] : Osler's nodes were not seenon the fingers [Skin Turgor] : normal skin turgor [Motor Exam] : the motor exam was normal [Mood] : the mood was normal [Memory Recent] : recent memory was not impaired [Memory Remote] : remote memory was not impaired

## 2019-01-09 NOTE — HISTORY OF PRESENT ILLNESS
[Difficulty Breathing During Exertion] : denies dyspnea on exertion [Feelings Of Weakness On Exertion] : denies exercise intolerance [Cough] : stable coughing [Wheezing] : denies wheezing [Regional Soft Tissue Swelling Both Lower Extremities] : denies lower extremity edema [Chest Pain Or Discomfort] : denies chest pain [Fever] : denies fever [Date: ___] : was performed [unfilled] [Cigarettes ___ /Day] : reports smoking [unfilled] packs of cigarettes per day [___ Year Quit] : ~He/She~ quit smoking in [unfilled] [___ Pack Year History] : [unfilled] pack year history [Smoking Cessation Medication Prescribed] : was prescribed smoking cessation medication. [None] : None [Adherent] : the patient is adherent with ~his/her~ medication regimen [Goals--Doing Well] : the patient is doing well with ~his/her~ goals [Improved] : have improved [Snoring] : snoring [Wt Gain ___ Lbs] : no recent weight gain [Wt Loss ___ Lbs] : no recent weight loss [Oxygen] : the patient uses no supplemental oxygen [Side Effects] : ~He/She~ denies medication side effects [de-identified] : thyroidectomy 12/17/2018 [de-identified] : stable clustered nodules in medial left upper lobe, largest roughly 1.1 cm.  [Nocturnal Oxygen] : The patient does not use nocturnal oxygen [FreeTextEntry1] : 58 year old man with a PMHx of a cluster of pulmonary nodules found on CT chest, thyroid nodule s/p thyroidectomy 12/17/18, HTN, DLD, Right foot osteoarthritis, chronic back pain. He is here today for follow up of his cluster of pulmonary nodules after a repeat CT chest on 12/4/18. He denies SOB, wheezing, SOB on exertion, cough, weight loss, night sweats. He feels well overall and his only complaint is "recovering from thyroid surgery". He had a thyroidectomy on 12/17/18. Pathology of the thyroid specimen showed no tumor with nodular hyperplasia/multinodular goiter of the thyroid. He recently quit smoking about a month ago after a 40 pack year smoking history. He is still using nicotine replacement therapy(gum and patch) however he still has 1-2 cigarettes per day. He is currently on disability due to his chronic back and hip pain but used to work as a  in the television industry.\par \par CT chest(12/4/18): stable clustered nodules in medial left upper lobe, largest roughly 1.1 cm. \par \par CT(09/30/2018): centrilobular emphysema, RUL calcified subcentimeter granuloma, several solid nodules in anterior EDMUNDO up to 1.1 cm, LLL 0.5 cm subpleural nodule\par \par PET/CT(09/17/2018):minimal uptake in tubular branching opacity in EDMUNDO anterior region with a lobulated nodular component suggestive of mucoid impaction. No FDG activity in any other site \par  \par

## 2019-01-09 NOTE — REVIEW OF SYSTEMS
[Back Pain] : ~T back pain [Arthralgias] : arthralgias [Negative] : Pulmonary Hypertension [As Noted in HPI] : as noted in HPI [Thyroid Problem] : thyroid problem [Snoring] : snoring [Fever] : no fever [Chills] : no chills [Fatigue] : no fatigue [Poor Appetite] : normal appetite  [Recent Wt Gain (___ Lbs)] : no recent weight gain [Recent Wt Loss (___ Lbs)] : no recent weight loss [Cough] : no cough [Sputum] : not coughing up ~M sputum [Hemoptysis] : no hemoptysis [Dyspnea] : no dyspnea [Chest Tightness] : no chest tightness [Pleuritic Pain] : no pleuritic pain [Frequent URIs] : no frequent upper respiratory infections [Wheezing] : no wheezing [Hypertension] : no ~T hypertension [Hypotension] : no hypotension [Chest Discomfort] : no chest discomfort [PND] : no PND [Orthopnea] : no orthopnea [Dysrhythmia] : no dysrhythmia [Palpitations] : no palpitations [Edema] : ~T edema was not present [Claudication] : no intermittent claudication [Leg Cramps] : no leg cramps [Heartburn] : no heartburn [Reflux] : no reflux [Indigestion] : no indigestion [Dysphagia] : no dysphagia [Nausea] : no nausea [Vomiting] : no vomiting [Constipation] : no constipation [Diarrhea] : no diarrhea [Food Intolerance] : no ~T food intolerance [Abdominal Pain] : no abdominal pain [Bleeding] : no bleeding [Headache] : no headache [Dizziness] : no dizziness [Syncope] : no fainting [Numbness] : no numbness [Paralysis] : no paralysis was seen [Seizures] : no seizures [Memory Loss] : no ~T memory lapses or loss [Confusion] : no confusion [Tremor] : ~T no ~M tremor was seen [Depression] : no depression [Anxiety] : no anxiety [Difficulty Initiating Sleep] : no difficulty falling asleep [Difficulty Maintaining Sleep] : no difficulty maintaining sleep [Awakes With Headache] : awakes without a headache [Awakes With Dry Mouth] : awakes without dry mouth

## 2019-01-09 NOTE — REASON FOR VISIT
[Follow-Up] : a follow-up visit [Abnormal CT Scan] : abnormal CT Scan [FreeTextEntry2] : cluster of lung nodules

## 2019-01-09 NOTE — ASSESSMENT
[FreeTextEntry1] : 58 year old man with a PMHx of Pulmonary nodules, thyroid nodule s/p thyroidectomy 12/17/18, HTN, DLD, Right foot osteoarthritis, chronic back pain\par \par Cluster of pulmonary nodules in medial left upper lobe, largest is 1.1 cm in face of significant smoking history; Minimal FDG uptake on PET/CT so likely benign.\par -CT chest(12/4/18): stable clustered nodules in medial left upper lobe, largest roughly 1.1 cm. \par -CT(09/30/2018);cenrilobular emphysema, RUL subcentimeter calcified granuloma, several solid nodules in anterior EDMUNDO up to 1.1 cm, LLL 0.5 cm subpleural nodule\par -PET/CT(09/17/2018):minimal uptake in tubular branching opacity in EDMUNDO anterior region with a lobulated nodular component suggestive of mucoid impaction. No FDG activity in any other site \par - will need repeat CT chest in 6 months\par \par Active smoker\par -patient on nicotine gum and patch and is still responding well.\par -still smoking 1-2 cigarettes per day, required 1-3 nicotine gums daily\par -counselled about smoking cessation, motivated to quit although has his days of stress and will smoke a few.\par \par Emphysema on CT chest\par -patient has a 40 pack year smoking history\par -currently asymptomatic\par \par History of TB exposure from father\par -currently asymptomatic\par -CT chest negative for any typical lesion\par -quantiferon(10/22/18): negative\par \par Healthcare maintainance\par -flu shot administered last visit on 10/16/18\par \par Follow-up in 6 months after CT chest done.\par

## 2019-01-10 ENCOUNTER — APPOINTMENT (OUTPATIENT)
Dept: OTOLARYNGOLOGY | Facility: CLINIC | Age: 59
End: 2019-01-10
Payer: MEDICARE

## 2019-01-10 DIAGNOSIS — T81.49XA INFECTION FOLLOWING A PROCEDURE, OTHER SURGICAL SITE, INITIAL ENCOUNTER: ICD-10-CM

## 2019-01-10 PROCEDURE — 99024 POSTOP FOLLOW-UP VISIT: CPT

## 2019-01-10 NOTE — PHYSICAL EXAM
[de-identified] : supraincisional collection has decreased significantly [Midline] : trachea located in midline position [Normal] : no rashes

## 2019-01-10 NOTE — REASON FOR VISIT
[Post-Operative Visit] : a post-operative visit [FreeTextEntry2] : s/p 12/17/18 partial thyroidectomy

## 2019-01-14 ENCOUNTER — OUTPATIENT (OUTPATIENT)
Dept: OUTPATIENT SERVICES | Facility: HOSPITAL | Age: 59
LOS: 1 days | Discharge: HOME | End: 2019-01-14

## 2019-01-14 DIAGNOSIS — E89.0 POSTPROCEDURAL HYPOTHYROIDISM: ICD-10-CM

## 2019-01-14 DIAGNOSIS — Z96.643 PRESENCE OF ARTIFICIAL HIP JOINT, BILATERAL: Chronic | ICD-10-CM

## 2019-01-14 DIAGNOSIS — E83.51 HYPOCALCEMIA: ICD-10-CM

## 2019-01-14 DIAGNOSIS — Z98.890 OTHER SPECIFIED POSTPROCEDURAL STATES: Chronic | ICD-10-CM

## 2019-01-16 ENCOUNTER — APPOINTMENT (OUTPATIENT)
Dept: OTOLARYNGOLOGY | Facility: CLINIC | Age: 59
End: 2019-01-16

## 2019-02-19 DIAGNOSIS — E04.1 NONTOXIC SINGLE THYROID NODULE: ICD-10-CM

## 2019-02-19 DIAGNOSIS — Z88.0 ALLERGY STATUS TO PENICILLIN: ICD-10-CM

## 2019-03-05 ENCOUNTER — APPOINTMENT (OUTPATIENT)
Dept: OTOLARYNGOLOGY | Facility: CLINIC | Age: 59
End: 2019-03-05
Payer: MEDICARE

## 2019-03-05 VITALS — WEIGHT: 197 LBS | BODY MASS INDEX: 26.68 KG/M2 | HEIGHT: 72 IN

## 2019-03-05 DIAGNOSIS — H92.02 OTALGIA, LEFT EAR: ICD-10-CM

## 2019-03-05 PROCEDURE — 92550 TYMPANOMETRY & REFLEX THRESH: CPT

## 2019-03-05 NOTE — HISTORY OF PRESENT ILLNESS
[FreeTextEntry1] : Patient s/p partial thyroidectomy on 12/17/18.  Doing well post surgery.\par  Patient has had left intermittent otalgia for one year.  Associated symptoms: occasional tinnitus, occasional nasal congestion.   Modifying factors: some improvement with Ibuprofen.  Pain 6-7 on 1-10 scale.

## 2019-03-11 ENCOUNTER — APPOINTMENT (OUTPATIENT)
Dept: PODIATRY | Facility: CLINIC | Age: 59
End: 2019-03-11
Payer: MEDICARE

## 2019-03-11 ENCOUNTER — OUTPATIENT (OUTPATIENT)
Dept: OUTPATIENT SERVICES | Facility: HOSPITAL | Age: 59
LOS: 1 days | Discharge: HOME | End: 2019-03-11

## 2019-03-11 VITALS
HEART RATE: 59 BPM | BODY MASS INDEX: 25.73 KG/M2 | HEIGHT: 72 IN | WEIGHT: 190 LBS | DIASTOLIC BLOOD PRESSURE: 81 MMHG | SYSTOLIC BLOOD PRESSURE: 138 MMHG

## 2019-03-11 DIAGNOSIS — Z98.890 OTHER SPECIFIED POSTPROCEDURAL STATES: Chronic | ICD-10-CM

## 2019-03-11 DIAGNOSIS — Z96.643 PRESENCE OF ARTIFICIAL HIP JOINT, BILATERAL: Chronic | ICD-10-CM

## 2019-03-11 PROCEDURE — 20600 DRAIN/INJ JOINT/BURSA W/O US: CPT

## 2019-03-12 NOTE — HISTORY OF PRESENT ILLNESS
[FreeTextEntry1] : Patient returns to clinic for follow up on pain of the dorsum of his right foot. Patient states that the pain was relieved for 6 weeks because of the injection given to him at the last visit. Patient is interested in another injection at the same site.pt states that injection is helping at this time and no surgical intervention at this time. pt states that no pain on the dorsum of the right foot today. Patient denies NVFC and denies SOB at this time.\par

## 2019-03-12 NOTE — PHYSICAL EXAM
[General Appearance - Alert] : alert [General Appearance - In No Acute Distress] : in no acute distress [General Appearance - Well Nourished] : well nourished [General Appearance - Well Developed] : well developed [General Appearance - Well-Appearing] : healthy appearing [Full Pulse] : the pedal pulses are present [Edema] : there was no peripheral edema [Nail Clubbing] : no clubbing  or cyanosis of the fingernails [Musculoskeletal - Swelling] : no joint swelling seen [Motor Tone] : muscle strength and tone were normal [Skin Color & Pigmentation] : normal skin color and pigmentation [Skin Turgor] : normal skin turgor [] : no rash [Right Foot Was Examined] : The right foot was examined [Left Foot Was Examined] : The left foot was examined [Normal Appearance] : normal in appearance [Tenderness] : tender [Normal] : normal [2+] : 2+ in the dorsalis pedis [Deep Tendon Reflexes (DTR)] : deep tendon reflexes were 2+ and symmetric [Sensation] : the sensory exam was normal to light touch and pinprick [No Focal Deficits] : no focal deficits [Oriented To Time, Place, And Person] : oriented to person, place, and time [Impaired Insight] : insight and judgment were intact [Affect] : the affect was normal [Erythema] : not erythematous [Normal in Appearance] : not normal in appearance [Swelling] : not swollen [Vibration Dec.] : normal vibratory sensation at the level of the toes [Position Sense Dec.] : normal position sense at the level of the toes [Diminished Throughout Right Foot] : normal tactile sensation with monofilament testing throughout right foot [Diminished Throughout Left Foot] : normal tactile sensation with monofilament testing throughout left foot [FreeTextEntry1] : dorsum tarsal metatarsal joint  [FreeTextEntry5] : dorsal tarsal metatarsal joint

## 2019-04-03 ENCOUNTER — OUTPATIENT (OUTPATIENT)
Dept: OUTPATIENT SERVICES | Facility: HOSPITAL | Age: 59
LOS: 1 days | Discharge: HOME | End: 2019-04-03

## 2019-04-03 DIAGNOSIS — Z96.643 PRESENCE OF ARTIFICIAL HIP JOINT, BILATERAL: Chronic | ICD-10-CM

## 2019-04-03 DIAGNOSIS — Z98.890 OTHER SPECIFIED POSTPROCEDURAL STATES: Chronic | ICD-10-CM

## 2019-04-15 DIAGNOSIS — Z01.21 ENCOUNTER FOR DENTAL EXAMINATION AND CLEANING WITH ABNORMAL FINDINGS: ICD-10-CM

## 2019-04-18 ENCOUNTER — APPOINTMENT (OUTPATIENT)
Dept: OTOLARYNGOLOGY | Facility: CLINIC | Age: 59
End: 2019-04-18
Payer: MEDICARE

## 2019-04-18 DIAGNOSIS — H69.82 OTHER SPECIFIED DISORDERS OF EUSTACHIAN TUBE, LEFT EAR: ICD-10-CM

## 2019-04-18 PROCEDURE — 92550 TYMPANOMETRY & REFLEX THRESH: CPT

## 2019-04-18 PROCEDURE — 31231 NASAL ENDOSCOPY DX: CPT

## 2019-04-18 NOTE — REASON FOR VISIT
[Subsequent Evaluation] : a subsequent evaluation for [FreeTextEntry2] : dysfunction of left eustachian tube

## 2019-04-18 NOTE — HISTORY OF PRESENT ILLNESS
[FreeTextEntry1] : Pt returns to the office to follow up on dysfunction of left eustachian tube. Pt was given nasal sprays at last visit. Patient notes relief with Flonase and states to be doing well otherwise. Denies otalgia or otorrhea.

## 2019-04-26 ENCOUNTER — APPOINTMENT (OUTPATIENT)
Dept: INTERNAL MEDICINE | Facility: CLINIC | Age: 59
End: 2019-04-26

## 2019-04-26 ENCOUNTER — OUTPATIENT (OUTPATIENT)
Dept: OUTPATIENT SERVICES | Facility: HOSPITAL | Age: 59
LOS: 1 days | Discharge: HOME | End: 2019-04-26

## 2019-04-26 VITALS
DIASTOLIC BLOOD PRESSURE: 88 MMHG | HEART RATE: 67 BPM | SYSTOLIC BLOOD PRESSURE: 128 MMHG | BODY MASS INDEX: 26.41 KG/M2 | WEIGHT: 195 LBS | TEMPERATURE: 98 F | HEIGHT: 72 IN

## 2019-04-26 DIAGNOSIS — Z98.890 OTHER SPECIFIED POSTPROCEDURAL STATES: Chronic | ICD-10-CM

## 2019-04-26 DIAGNOSIS — Z96.643 PRESENCE OF ARTIFICIAL HIP JOINT, BILATERAL: Chronic | ICD-10-CM

## 2019-04-26 DIAGNOSIS — I10 ESSENTIAL (PRIMARY) HYPERTENSION: ICD-10-CM

## 2019-04-26 RX ORDER — CLINDAMYCIN HYDROCHLORIDE 300 MG/1
300 CAPSULE ORAL EVERY 6 HOURS
Qty: 40 | Refills: 2 | Status: COMPLETED | COMMUNITY
Start: 2019-01-03 | End: 2019-02-02

## 2019-04-26 NOTE — ASSESSMENT
[FreeTextEntry1] : 58 yom with pmh of htn, pulmonary nodules, thyroid mass  s/p hemithyroidectomy in 12/2018, smoking ; pt denies sob, chest pain, orthopnea;\par \par # Thyroid mass s/p hemithyroidectomy, pathology positive for hyperplasia/no tumor seen\par - TSH wnl\par - no symptoms, following ENT \par \par # Eustachian tube dysfunction:\par - evaluated by ENT, ear discomfort 22/to ET tube dysfunction \par - stable with flonase\par \par # lung nodules\par - repeat CT lungs q6 months\par - f/u with pulm\par \par # Current day smoker:\par - on nicotine patch 14mg patch , c/w same\par - declined offer of varenicline\par # HTN \par - c/w norvasc, aspirin\par \par # DLD\par - c/w statin\par \par # chronic lbp, joint pain\par - c/w meloxicam, robaxin prn\par \par # Low back pain/ foot OA\par - c/w robaxin prn; c/w gabapentin\par \par # HCM\par - colonoscopy done 2010?, will give referral when he comes back in 6 months\par - pneumococcal vaccine at 65\par - repeat low dose CT lungs in June 2018, he has script\par - repeat regular labs then \par - RTC in 6 months

## 2019-04-26 NOTE — HISTORY OF PRESENT ILLNESS
[de-identified] : 58 yom with pmh of htn, pulmonary nodules, thyroid mass s/p FNA showing benign follicular cells, smoking presents for pre-op clearance; pt is going for hemithyroidectomy by ENT on 12/17; pt denies sob, chest pain, orthopnea; pt was seen by cardio and was cleared for surgery

## 2019-04-26 NOTE — PHYSICAL EXAM
[EOMI] : extraocular movements intact [No Acute Distress] : no acute distress [No JVD] : no jugular venous distention [No Lymphadenopathy] : no lymphadenopathy [Thyroid Normal, No Nodules] : the thyroid was normal and there were no nodules present [No Respiratory Distress] : no respiratory distress  [Clear to Auscultation] : lungs were clear to auscultation bilaterally [No Accessory Muscle Use] : no accessory muscle use [Normal S1, S2] : normal S1 and S2 [No Murmur] : no murmur heard [No Carotid Bruits] : no carotid bruits [No Edema] : there was no peripheral edema [Soft] : abdomen soft [Non Tender] : non-tender [Normal Posterior Cervical Nodes] : no posterior cervical lymphadenopathy [No HSM] : no HSM [Normal Anterior Cervical Nodes] : no anterior cervical lymphadenopathy [No CVA Tenderness] : no CVA  tenderness [No Rash] : no rash [No Focal Deficits] : no focal deficits [Normal Insight/Judgement] : insight and judgment were intact [Normal Affect] : the affect was normal

## 2019-04-29 ENCOUNTER — OUTPATIENT (OUTPATIENT)
Dept: OUTPATIENT SERVICES | Facility: HOSPITAL | Age: 59
LOS: 1 days | Discharge: HOME | End: 2019-04-29

## 2019-04-29 ENCOUNTER — APPOINTMENT (OUTPATIENT)
Dept: PODIATRY | Facility: CLINIC | Age: 59
End: 2019-04-29
Payer: MEDICARE

## 2019-04-29 VITALS
BODY MASS INDEX: 26.41 KG/M2 | WEIGHT: 195 LBS | HEIGHT: 72 IN | HEART RATE: 54 BPM | DIASTOLIC BLOOD PRESSURE: 76 MMHG | SYSTOLIC BLOOD PRESSURE: 111 MMHG

## 2019-04-29 DIAGNOSIS — Z98.890 OTHER SPECIFIED POSTPROCEDURAL STATES: Chronic | ICD-10-CM

## 2019-04-29 DIAGNOSIS — Z96.643 PRESENCE OF ARTIFICIAL HIP JOINT, BILATERAL: Chronic | ICD-10-CM

## 2019-04-29 DIAGNOSIS — M79.672 PAIN IN LEFT FOOT: ICD-10-CM

## 2019-04-29 PROCEDURE — 20600 DRAIN/INJ JOINT/BURSA W/O US: CPT

## 2019-04-30 ENCOUNTER — OUTPATIENT (OUTPATIENT)
Dept: OUTPATIENT SERVICES | Facility: HOSPITAL | Age: 59
LOS: 1 days | Discharge: HOME | End: 2019-04-30

## 2019-04-30 ENCOUNTER — APPOINTMENT (OUTPATIENT)
Dept: DERMATOLOGY | Facility: CLINIC | Age: 59
End: 2019-04-30

## 2019-04-30 DIAGNOSIS — Z98.890 OTHER SPECIFIED POSTPROCEDURAL STATES: Chronic | ICD-10-CM

## 2019-04-30 DIAGNOSIS — R22.9 LOCALIZED SWELLING, MASS AND LUMP, UNSPECIFIED: ICD-10-CM

## 2019-04-30 DIAGNOSIS — Z96.643 PRESENCE OF ARTIFICIAL HIP JOINT, BILATERAL: Chronic | ICD-10-CM

## 2019-04-30 NOTE — ASSESSMENT
[FreeTextEntry1] : Patient examined, history and chart reviewed\par  discussed complications and benefits of injection in detail  patient consented to injection right foot \par injection of right foot with 1cc betamethasone and 2cc of lidocaine 2%, patient demonstrated verbal \par Follow up in 6 weeks

## 2019-04-30 NOTE — HISTORY OF PRESENT ILLNESS
[FreeTextEntry1] : Numerous skn Bumps [de-identified] : 58 yom with pmh of htn, pulmonary nodules, thyroid mass s/p FNA showing benign follicular cells, smoking presents because od numerous bumps on his chest amdome and shoulder. He says these lesions has been there since his teen years and has not changed in character, size or number. No discharge and no pain.\par He has history of seborrheic keratosis ( s/p cryotherapy years ago) and skin cystic condition ? since childhood.

## 2019-04-30 NOTE — PHYSICAL EXAM
[Full Body Skin Exam Performed] : performed [FreeTextEntry3] : Several skin nodules around 2 cm in size ; some are firm and some are soft.\par on back: several skin tags, hemagiomas and seborrheic keratosis lesions( brown stuck on lesions)

## 2019-04-30 NOTE — ASSESSMENT
[FreeTextEntry1] : #Skin nodules\par r/o lipomas vs seborrheic cysts \par Reassurance given about benign nature of lesions\par Instructed to notify his PMD in case any of the lesion become red or painful\par Education about sun exposure provided

## 2019-05-23 ENCOUNTER — RX CHANGE (OUTPATIENT)
Age: 59
End: 2019-05-23

## 2019-06-06 ENCOUNTER — APPOINTMENT (OUTPATIENT)
Dept: CARDIOLOGY | Facility: CLINIC | Age: 59
End: 2019-06-06

## 2019-06-06 ENCOUNTER — OUTPATIENT (OUTPATIENT)
Dept: OUTPATIENT SERVICES | Facility: HOSPITAL | Age: 59
LOS: 1 days | Discharge: HOME | End: 2019-06-06

## 2019-06-06 DIAGNOSIS — E87.0 HYPEROSMOLALITY AND HYPERNATREMIA: ICD-10-CM

## 2019-06-06 DIAGNOSIS — Z98.890 OTHER SPECIFIED POSTPROCEDURAL STATES: Chronic | ICD-10-CM

## 2019-06-06 DIAGNOSIS — Z96.643 PRESENCE OF ARTIFICIAL HIP JOINT, BILATERAL: Chronic | ICD-10-CM

## 2019-06-14 ENCOUNTER — OUTPATIENT (OUTPATIENT)
Dept: OUTPATIENT SERVICES | Facility: HOSPITAL | Age: 59
LOS: 1 days | Discharge: HOME | End: 2019-06-14

## 2019-06-14 DIAGNOSIS — Z96.643 PRESENCE OF ARTIFICIAL HIP JOINT, BILATERAL: Chronic | ICD-10-CM

## 2019-06-14 DIAGNOSIS — Z98.890 OTHER SPECIFIED POSTPROCEDURAL STATES: Chronic | ICD-10-CM

## 2019-06-16 ENCOUNTER — FORM ENCOUNTER (OUTPATIENT)
Age: 59
End: 2019-06-16

## 2019-06-17 ENCOUNTER — OUTPATIENT (OUTPATIENT)
Dept: OUTPATIENT SERVICES | Facility: HOSPITAL | Age: 59
LOS: 1 days | Discharge: HOME | End: 2019-06-17

## 2019-06-17 ENCOUNTER — OUTPATIENT (OUTPATIENT)
Dept: OUTPATIENT SERVICES | Facility: HOSPITAL | Age: 59
LOS: 1 days | Discharge: HOME | End: 2019-06-17
Payer: MEDICAID

## 2019-06-17 ENCOUNTER — APPOINTMENT (OUTPATIENT)
Dept: PODIATRY | Facility: CLINIC | Age: 59
End: 2019-06-17
Payer: MEDICARE

## 2019-06-17 VITALS
WEIGHT: 195 LBS | HEART RATE: 47 BPM | BODY MASS INDEX: 26.41 KG/M2 | HEIGHT: 72 IN | DIASTOLIC BLOOD PRESSURE: 73 MMHG | SYSTOLIC BLOOD PRESSURE: 128 MMHG

## 2019-06-17 DIAGNOSIS — Z96.643 PRESENCE OF ARTIFICIAL HIP JOINT, BILATERAL: Chronic | ICD-10-CM

## 2019-06-17 DIAGNOSIS — Z98.890 OTHER SPECIFIED POSTPROCEDURAL STATES: Chronic | ICD-10-CM

## 2019-06-17 DIAGNOSIS — R91.8 OTHER NONSPECIFIC ABNORMAL FINDING OF LUNG FIELD: ICD-10-CM

## 2019-06-17 PROCEDURE — 20605 DRAIN/INJ JOINT/BURSA W/O US: CPT

## 2019-06-17 PROCEDURE — 71250 CT THORAX DX C-: CPT | Mod: 26

## 2019-06-27 DIAGNOSIS — Z01.20 ENCOUNTER FOR DENTAL EXAMINATION AND CLEANING WITHOUT ABNORMAL FINDINGS: ICD-10-CM

## 2019-06-27 NOTE — PHYSICAL EXAM
[General Appearance - Alert] : alert [General Appearance - In No Acute Distress] : in no acute distress [General Appearance - Well Nourished] : well nourished [General Appearance - Well-Appearing] : healthy appearing [General Appearance - Well Developed] : well developed [Full Pulse] : the pedal pulses are present [Edema] : there was no peripheral edema [Musculoskeletal - Swelling] : no joint swelling seen [Nail Clubbing] : no clubbing  or cyanosis of the fingernails [Motor Tone] : muscle strength and tone were normal [Skin Color & Pigmentation] : normal skin color and pigmentation [Skin Turgor] : normal skin turgor [Right Foot Was Examined] : The right foot was examined [] : no rash [Left Foot Was Examined] : The left foot was examined [Normal Appearance] : normal in appearance [Tenderness] : tender [Normal] : normal [2+] : 2+ in the dorsalis pedis [Sensation] : the sensory exam was normal to light touch and pinprick [Deep Tendon Reflexes (DTR)] : deep tendon reflexes were 2+ and symmetric [No Focal Deficits] : no focal deficits [Oriented To Time, Place, And Person] : oriented to person, place, and time [Impaired Insight] : insight and judgment were intact [Affect] : the affect was normal [Erythema] : not erythematous [Normal in Appearance] : not normal in appearance [Swelling] : not swollen [Position Sense Dec.] : normal position sense at the level of the toes [Vibration Dec.] : normal vibratory sensation at the level of the toes [Diminished Throughout Right Foot] : normal tactile sensation with monofilament testing throughout right foot [Diminished Throughout Left Foot] : normal tactile sensation with monofilament testing throughout left foot [FreeTextEntry5] : dorsal tarsal metatarsal joint  [FreeTextEntry1] : no clinical signs of infection right 1st medial border. hyperkeratotic skin noted.

## 2019-07-01 DIAGNOSIS — R91.8 OTHER NONSPECIFIC ABNORMAL FINDING OF LUNG FIELD: ICD-10-CM

## 2019-07-01 DIAGNOSIS — M19.071 PRIMARY OSTEOARTHRITIS, RIGHT ANKLE AND FOOT: ICD-10-CM

## 2019-07-01 DIAGNOSIS — M79.671 PAIN IN RIGHT FOOT: ICD-10-CM

## 2019-07-01 DIAGNOSIS — Z02.9 ENCOUNTER FOR ADMINISTRATIVE EXAMINATIONS, UNSPECIFIED: ICD-10-CM

## 2019-07-15 ENCOUNTER — OUTPATIENT (OUTPATIENT)
Dept: OUTPATIENT SERVICES | Facility: HOSPITAL | Age: 59
LOS: 1 days | Discharge: HOME | End: 2019-07-15

## 2019-07-15 ENCOUNTER — APPOINTMENT (OUTPATIENT)
Dept: PODIATRY | Facility: CLINIC | Age: 59
End: 2019-07-15
Payer: MEDICARE

## 2019-07-15 VITALS
HEIGHT: 72 IN | HEART RATE: 59 BPM | DIASTOLIC BLOOD PRESSURE: 75 MMHG | WEIGHT: 195 LBS | BODY MASS INDEX: 26.41 KG/M2 | SYSTOLIC BLOOD PRESSURE: 116 MMHG

## 2019-07-15 DIAGNOSIS — Z98.890 OTHER SPECIFIED POSTPROCEDURAL STATES: Chronic | ICD-10-CM

## 2019-07-15 DIAGNOSIS — Z96.643 PRESENCE OF ARTIFICIAL HIP JOINT, BILATERAL: Chronic | ICD-10-CM

## 2019-07-15 PROCEDURE — 20605 DRAIN/INJ JOINT/BURSA W/O US: CPT

## 2019-07-23 ENCOUNTER — APPOINTMENT (OUTPATIENT)
Dept: PULMONOLOGY | Facility: CLINIC | Age: 59
End: 2019-07-23

## 2019-07-25 NOTE — ASSESSMENT
[FreeTextEntry1] : Patient examined, history and chart reviewed\par  discussed complications and benefits of injection in detail  patient consented to injection right foot \par injection of right foot with 0.5 cc betamethasone and 1cc of lidocaine 2%, patient demonstrated verbal \par Follow up in 4 weeks

## 2019-08-15 ENCOUNTER — APPOINTMENT (OUTPATIENT)
Dept: PODIATRY | Facility: CLINIC | Age: 59
End: 2019-08-15
Payer: MEDICARE

## 2019-08-15 ENCOUNTER — OUTPATIENT (OUTPATIENT)
Dept: OUTPATIENT SERVICES | Facility: HOSPITAL | Age: 59
LOS: 1 days | Discharge: HOME | End: 2019-08-15

## 2019-08-15 VITALS
SYSTOLIC BLOOD PRESSURE: 119 MMHG | WEIGHT: 195 LBS | DIASTOLIC BLOOD PRESSURE: 77 MMHG | HEIGHT: 72 IN | HEART RATE: 54 BPM | BODY MASS INDEX: 26.41 KG/M2

## 2019-08-15 DIAGNOSIS — Z98.890 OTHER SPECIFIED POSTPROCEDURAL STATES: Chronic | ICD-10-CM

## 2019-08-15 DIAGNOSIS — Z96.643 PRESENCE OF ARTIFICIAL HIP JOINT, BILATERAL: Chronic | ICD-10-CM

## 2019-08-15 PROCEDURE — 20605 DRAIN/INJ JOINT/BURSA W/O US: CPT | Mod: RT

## 2019-08-26 NOTE — PHYSICAL EXAM
[General Appearance - Alert] : alert [General Appearance - Well Nourished] : well nourished [General Appearance - In No Acute Distress] : in no acute distress [General Appearance - Well Developed] : well developed [General Appearance - Well-Appearing] : healthy appearing [Full Pulse] : the pedal pulses are present [Edema] : there was no peripheral edema [Nail Clubbing] : no clubbing  or cyanosis of the fingernails [Musculoskeletal - Swelling] : no joint swelling seen [Motor Tone] : muscle strength and tone were normal [Skin Color & Pigmentation] : normal skin color and pigmentation [Skin Turgor] : normal skin turgor [] : no rash [Right Foot Was Examined] : The right foot was examined [Left Foot Was Examined] : The left foot was examined [Normal Appearance] : normal in appearance [Tenderness] : tender [Normal] : normal [2+] : 2+ in the posterior tibialis [Deep Tendon Reflexes (DTR)] : deep tendon reflexes were 2+ and symmetric [Sensation] : the sensory exam was normal to light touch and pinprick [No Focal Deficits] : no focal deficits [Impaired Insight] : insight and judgment were intact [Oriented To Time, Place, And Person] : oriented to person, place, and time [Affect] : the affect was normal [Erythema] : not erythematous [Normal in Appearance] : not normal in appearance [Swelling] : not swollen [Vibration Dec.] : normal vibratory sensation at the level of the toes [Position Sense Dec.] : normal position sense at the level of the toes [Diminished Throughout Right Foot] : normal tactile sensation with monofilament testing throughout right foot [Diminished Throughout Left Foot] : normal tactile sensation with monofilament testing throughout left foot [FreeTextEntry5] : dorsal tarsal metatarsal joint  [FreeTextEntry1] : dorsum tarsal metatarsal joint

## 2019-09-05 ENCOUNTER — APPOINTMENT (OUTPATIENT)
Dept: VASCULAR SURGERY | Facility: CLINIC | Age: 59
End: 2019-09-05
Payer: MEDICARE

## 2019-09-05 VITALS
DIASTOLIC BLOOD PRESSURE: 86 MMHG | WEIGHT: 195 LBS | BODY MASS INDEX: 26.41 KG/M2 | SYSTOLIC BLOOD PRESSURE: 120 MMHG | HEIGHT: 72 IN

## 2019-09-05 DIAGNOSIS — I83.893 VARICOSE VEINS OF BILATERAL LOWER EXTREMITIES WITH OTHER COMPLICATIONS: ICD-10-CM

## 2019-09-05 PROCEDURE — 93971 EXTREMITY STUDY: CPT | Mod: RT

## 2019-09-05 PROCEDURE — 99203 OFFICE O/P NEW LOW 30 MIN: CPT | Mod: 25

## 2019-09-05 NOTE — ASSESSMENT
[FreeTextEntry1] : 59 year old with asymptomatic varicose veins. He would require stab phlebectomy for treatment of the veins. He does not wish to have any intervention at this time. I will see him back in the office in 1 year for follow up. I recommended compression stocking.

## 2019-09-05 NOTE — HISTORY OF PRESENT ILLNESS
[FreeTextEntry1] : The patient state he noticed these varicosities 3 months ago. He has a history of Bilateral THR 2010. He denies pain or ulceration. He denies swelling.

## 2019-09-05 NOTE — DATA REVIEWED
[FreeTextEntry1] : venous duplex shows no evidence of DVT. The GSV is competent. There is an accessory branch with incompetent  branches.

## 2019-09-12 ENCOUNTER — OUTPATIENT (OUTPATIENT)
Dept: OUTPATIENT SERVICES | Facility: HOSPITAL | Age: 59
LOS: 1 days | Discharge: HOME | End: 2019-09-12

## 2019-09-12 ENCOUNTER — APPOINTMENT (OUTPATIENT)
Dept: PODIATRY | Facility: CLINIC | Age: 59
End: 2019-09-12
Payer: MEDICARE

## 2019-09-12 VITALS
HEART RATE: 65 BPM | HEIGHT: 72 IN | WEIGHT: 195 LBS | SYSTOLIC BLOOD PRESSURE: 143 MMHG | DIASTOLIC BLOOD PRESSURE: 90 MMHG | BODY MASS INDEX: 26.41 KG/M2

## 2019-09-12 DIAGNOSIS — Z96.643 PRESENCE OF ARTIFICIAL HIP JOINT, BILATERAL: Chronic | ICD-10-CM

## 2019-09-12 DIAGNOSIS — L60.0 INGROWING NAIL: ICD-10-CM

## 2019-09-12 DIAGNOSIS — Z98.890 OTHER SPECIFIED POSTPROCEDURAL STATES: Chronic | ICD-10-CM

## 2019-09-12 PROCEDURE — 99212 OFFICE O/P EST SF 10 MIN: CPT | Mod: 25

## 2019-09-12 PROCEDURE — 20605 DRAIN/INJ JOINT/BURSA W/O US: CPT

## 2019-09-12 NOTE — PHYSICAL EXAM
[General Appearance - Alert] : alert [General Appearance - Well Nourished] : well nourished [General Appearance - In No Acute Distress] : in no acute distress [General Appearance - Well-Appearing] : healthy appearing [General Appearance - Well Developed] : well developed [Edema] : there was no peripheral edema [Full Pulse] : the pedal pulses are present [Nail Clubbing] : no clubbing  or cyanosis of the fingernails [Motor Tone] : muscle strength and tone were normal [Musculoskeletal - Swelling] : no joint swelling seen [Skin Color & Pigmentation] : normal skin color and pigmentation [Skin Turgor] : normal skin turgor [] : no rash [Left Foot Was Examined] : The left foot was examined [Right Foot Was Examined] : The right foot was examined [Normal Appearance] : normal in appearance [Tenderness] : tender [Erythema] : not erythematous [Normal in Appearance] : not normal in appearance [Swelling] : not swollen [Normal] : normal [2+] : 2+ in the dorsalis pedis [Vibration Dec.] : normal vibratory sensation at the level of the toes [Position Sense Dec.] : normal position sense at the level of the toes [Diminished Throughout Right Foot] : normal tactile sensation with monofilament testing throughout right foot [Diminished Throughout Left Foot] : normal tactile sensation with monofilament testing throughout left foot [FreeTextEntry5] : dorsal tarsal metatarsal joint  [FreeTextEntry1] : dorsum tarsal metatarsal joint  [Sensation] : the sensory exam was normal to light touch and pinprick [Deep Tendon Reflexes (DTR)] : deep tendon reflexes were 2+ and symmetric [Oriented To Time, Place, And Person] : oriented to person, place, and time [No Focal Deficits] : no focal deficits [Impaired Insight] : insight and judgment were intact [Affect] : the affect was normal

## 2019-09-12 NOTE — ASSESSMENT
[FreeTextEntry1] : Patient examined, history and chart reviewed\par  discussed complications and benefits of injection in detail  patient consented to injection right foot \par injection of right foot with 0.5 cc betamethasone and 1cc of lidocaine 2%, patient demonstrated verbal \par Left foot ingrown nail removed using slant back technique\par Discussed risk of infection \par Follow up in 4 weeks

## 2019-09-13 DIAGNOSIS — M79.671 PAIN IN RIGHT FOOT: ICD-10-CM

## 2019-09-13 DIAGNOSIS — L60.0 INGROWING NAIL: ICD-10-CM

## 2019-09-13 DIAGNOSIS — M19.071 PRIMARY OSTEOARTHRITIS, RIGHT ANKLE AND FOOT: ICD-10-CM

## 2019-09-19 ENCOUNTER — APPOINTMENT (OUTPATIENT)
Dept: PODIATRY | Facility: CLINIC | Age: 59
End: 2019-09-19

## 2019-10-04 ENCOUNTER — OUTPATIENT (OUTPATIENT)
Dept: OUTPATIENT SERVICES | Facility: HOSPITAL | Age: 59
LOS: 1 days | Discharge: HOME | End: 2019-10-04

## 2019-10-04 DIAGNOSIS — Z98.890 OTHER SPECIFIED POSTPROCEDURAL STATES: Chronic | ICD-10-CM

## 2019-10-04 DIAGNOSIS — Z96.643 PRESENCE OF ARTIFICIAL HIP JOINT, BILATERAL: Chronic | ICD-10-CM

## 2019-10-04 DIAGNOSIS — E89.0 POSTPROCEDURAL HYPOTHYROIDISM: ICD-10-CM

## 2019-10-07 LAB
25(OH)D3 SERPL-MCNC: 36 NG/ML
ALBUMIN SERPL ELPH-MCNC: 4.2 G/DL
ALP BLD-CCNC: 93 U/L
ALT SERPL-CCNC: 16 U/L
ANION GAP SERPL CALC-SCNC: 10 MMOL/L
AST SERPL-CCNC: 19 U/L
BASOPHILS # BLD AUTO: 0.04 K/UL
BASOPHILS NFR BLD AUTO: 0.5 %
BILIRUB SERPL-MCNC: 0.5 MG/DL
BUN SERPL-MCNC: 10 MG/DL
CALCIUM SERPL-MCNC: 9.3 MG/DL
CHLORIDE SERPL-SCNC: 106 MMOL/L
CHOLEST SERPL-MCNC: 136 MG/DL
CHOLEST/HDLC SERPL: 2.7 RATIO
CO2 SERPL-SCNC: 28 MMOL/L
CREAT SERPL-MCNC: 0.8 MG/DL
EOSINOPHIL # BLD AUTO: 0.22 K/UL
EOSINOPHIL NFR BLD AUTO: 2.9 %
ESTIMATED AVERAGE GLUCOSE: 111 MG/DL
GLUCOSE SERPL-MCNC: 109 MG/DL
HBA1C MFR BLD HPLC: 5.5 %
HCT VFR BLD CALC: 50.2 %
HDLC SERPL-MCNC: 50 MG/DL
HGB BLD-MCNC: 16.8 G/DL
IMM GRANULOCYTES NFR BLD AUTO: 0.3 %
LDLC SERPL CALC-MCNC: 72 MG/DL
LYMPHOCYTES # BLD AUTO: 2.75 K/UL
LYMPHOCYTES NFR BLD AUTO: 36.3 %
MAN DIFF?: NORMAL
MCHC RBC-ENTMCNC: 33.1 PG
MCHC RBC-ENTMCNC: 33.5 G/DL
MCV RBC AUTO: 99 FL
MONOCYTES # BLD AUTO: 0.57 K/UL
MONOCYTES NFR BLD AUTO: 7.5 %
NEUTROPHILS # BLD AUTO: 3.97 K/UL
NEUTROPHILS NFR BLD AUTO: 52.5 %
PLATELET # BLD AUTO: 170 K/UL
POTASSIUM SERPL-SCNC: 5 MMOL/L
PROT SERPL-MCNC: 6.5 G/DL
RBC # BLD: 5.07 M/UL
RBC # FLD: 13.7 %
SODIUM SERPL-SCNC: 144 MMOL/L
TRIGL SERPL-MCNC: 64 MG/DL
TSH SERPL-ACNC: 2.5 UIU/ML
WBC # FLD AUTO: 7.57 K/UL

## 2019-10-10 ENCOUNTER — APPOINTMENT (OUTPATIENT)
Dept: PODIATRY | Facility: CLINIC | Age: 59
End: 2019-10-10
Payer: MEDICARE

## 2019-10-10 ENCOUNTER — OUTPATIENT (OUTPATIENT)
Dept: OUTPATIENT SERVICES | Facility: HOSPITAL | Age: 59
LOS: 1 days | Discharge: HOME | End: 2019-10-10

## 2019-10-10 VITALS
SYSTOLIC BLOOD PRESSURE: 133 MMHG | DIASTOLIC BLOOD PRESSURE: 86 MMHG | BODY MASS INDEX: 26.41 KG/M2 | HEART RATE: 54 BPM | WEIGHT: 195 LBS | HEIGHT: 72 IN

## 2019-10-10 DIAGNOSIS — Z98.890 OTHER SPECIFIED POSTPROCEDURAL STATES: Chronic | ICD-10-CM

## 2019-10-10 DIAGNOSIS — Z96.643 PRESENCE OF ARTIFICIAL HIP JOINT, BILATERAL: Chronic | ICD-10-CM

## 2019-10-10 PROCEDURE — 20605 DRAIN/INJ JOINT/BURSA W/O US: CPT | Mod: RT

## 2019-10-18 NOTE — END OF VISIT
[] : Resident [FreeTextEntry3] : agree with above note.  \par Was present and instructing resident during visit. \par All Procedure performed under direct supervision.  \par EILEEN Fleming DPM\par  [Resident] : Resident [FreeTextEntry2] : agree with above note.  \par Was present and instructing resident during visit. \par All Procedure performed under direct supervision.  \par EILEEN Fleming DPM\par

## 2019-10-18 NOTE — PHYSICAL EXAM
[General Appearance - Alert] : alert [General Appearance - In No Acute Distress] : in no acute distress [General Appearance - Well Nourished] : well nourished [General Appearance - Well Developed] : well developed [General Appearance - Well-Appearing] : healthy appearing [Full Pulse] : the pedal pulses are present [Edema] : there was no peripheral edema [Nail Clubbing] : no clubbing  or cyanosis of the fingernails [Musculoskeletal - Swelling] : no joint swelling seen [Motor Tone] : muscle strength and tone were normal [Skin Color & Pigmentation] : normal skin color and pigmentation [Skin Turgor] : normal skin turgor [] : no rash [Right Foot Was Examined] : The right foot was examined [Left Foot Was Examined] : The left foot was examined [Tenderness] : tender [Normal Appearance] : normal in appearance [Erythema] : not erythematous [Normal in Appearance] : not normal in appearance [Swelling] : not swollen [Normal] : normal [2+] : 2+ in the posterior tibialis [Vibration Dec.] : normal vibratory sensation at the level of the toes [Position Sense Dec.] : normal position sense at the level of the toes [Diminished Throughout Left Foot] : normal tactile sensation with monofilament testing throughout left foot [Diminished Throughout Right Foot] : normal tactile sensation with monofilament testing throughout right foot [FreeTextEntry5] : dorsal tarsal metatarsal joint  [FreeTextEntry1] : dorsum tarsal metatarsal joint  [Deep Tendon Reflexes (DTR)] : deep tendon reflexes were 2+ and symmetric [Sensation] : the sensory exam was normal to light touch and pinprick [Impaired Insight] : insight and judgment were intact [Oriented To Time, Place, And Person] : oriented to person, place, and time [No Focal Deficits] : no focal deficits [Affect] : the affect was normal

## 2019-10-25 ENCOUNTER — OUTPATIENT (OUTPATIENT)
Dept: OUTPATIENT SERVICES | Facility: HOSPITAL | Age: 59
LOS: 1 days | Discharge: HOME | End: 2019-10-25

## 2019-10-25 ENCOUNTER — APPOINTMENT (OUTPATIENT)
Dept: INTERNAL MEDICINE | Facility: CLINIC | Age: 59
End: 2019-10-25
Payer: MEDICARE

## 2019-10-25 VITALS
BODY MASS INDEX: 27.09 KG/M2 | DIASTOLIC BLOOD PRESSURE: 85 MMHG | WEIGHT: 200 LBS | SYSTOLIC BLOOD PRESSURE: 125 MMHG | HEIGHT: 72 IN | HEART RATE: 65 BPM

## 2019-10-25 DIAGNOSIS — Z86.39 PERSONAL HISTORY OF OTHER ENDOCRINE, NUTRITIONAL AND METABOLIC DISEASE: ICD-10-CM

## 2019-10-25 DIAGNOSIS — Z20.1 CONTACT WITH AND (SUSPECTED) EXPOSURE TO TUBERCULOSIS: ICD-10-CM

## 2019-10-25 DIAGNOSIS — M54.12 RADICULOPATHY, CERVICAL REGION: ICD-10-CM

## 2019-10-25 DIAGNOSIS — Z98.890 OTHER SPECIFIED POSTPROCEDURAL STATES: Chronic | ICD-10-CM

## 2019-10-25 DIAGNOSIS — Z00.00 ENCOUNTER FOR GENERAL ADULT MEDICAL EXAMINATION W/OUT ABNORMAL FINDINGS: ICD-10-CM

## 2019-10-25 DIAGNOSIS — I25.10 ATHEROSCLEROTIC HEART DISEASE OF NATIVE CORONARY ARTERY W/OUT ANGINA PECTORIS: ICD-10-CM

## 2019-10-25 DIAGNOSIS — Z96.643 PRESENCE OF ARTIFICIAL HIP JOINT, BILATERAL: Chronic | ICD-10-CM

## 2019-10-25 DIAGNOSIS — E04.9 NONTOXIC GOITER, UNSPECIFIED: ICD-10-CM

## 2019-10-25 DIAGNOSIS — E04.2 NONTOXIC MULTINODULAR GOITER: ICD-10-CM

## 2019-10-25 PROCEDURE — ZZZZZ: CPT

## 2019-10-25 RX ORDER — VARENICLINE TARTRATE 0.5 (11)-1
0.5 MG X 11 & KIT ORAL
Qty: 1 | Refills: 0 | Status: DISCONTINUED | COMMUNITY
Start: 2019-05-23 | End: 2019-10-25

## 2019-10-25 RX ORDER — DICLOFENAC SODIUM 10 MG/G
1 GEL TOPICAL
Qty: 1 | Refills: 3 | Status: DISCONTINUED | COMMUNITY
Start: 2017-10-25 | End: 2019-10-25

## 2019-10-25 RX ORDER — NICOTINE POLACRILEX 4 MG/1
4 GUM, CHEWING ORAL
Qty: 1 | Refills: 3 | Status: DISCONTINUED | COMMUNITY
Start: 2018-09-11 | End: 2019-10-25

## 2019-10-25 RX ORDER — DICLOFENAC SODIUM 1 %
KIT TOPICAL
Refills: 0 | Status: DISCONTINUED | COMMUNITY
End: 2019-10-25

## 2019-10-28 DIAGNOSIS — M54.12 RADICULOPATHY, CERVICAL REGION: ICD-10-CM

## 2019-10-28 DIAGNOSIS — Z00.00 ENCOUNTER FOR GENERAL ADULT MEDICAL EXAMINATION WITHOUT ABNORMAL FINDINGS: ICD-10-CM

## 2019-10-28 DIAGNOSIS — Z86.31 PERSONAL HISTORY OF DIABETIC FOOT ULCER: ICD-10-CM

## 2019-10-28 DIAGNOSIS — Z20.1 CONTACT WITH AND (SUSPECTED) EXPOSURE TO TUBERCULOSIS: ICD-10-CM

## 2019-10-28 DIAGNOSIS — E04.9 NONTOXIC GOITER, UNSPECIFIED: ICD-10-CM

## 2019-10-28 DIAGNOSIS — I25.10 ATHEROSCLEROTIC HEART DISEASE OF NATIVE CORONARY ARTERY WITHOUT ANGINA PECTORIS: ICD-10-CM

## 2019-10-28 DIAGNOSIS — E78.5 HYPERLIPIDEMIA, UNSPECIFIED: ICD-10-CM

## 2019-11-04 NOTE — HISTORY OF PRESENT ILLNESS
[FreeTextEntry1] : 6 month f/u and medication refill  [de-identified] : 59 M with pmh of htn, pulmonary nodules, thyroid mass s/p FNA showing benign follicular cells s/p partial hemithyroidectomy, smoking presents to general f/u and med refill. The pt denies any active issues, states he quit smoking several months ago, not wearing the patch.

## 2019-11-04 NOTE — ASSESSMENT
[FreeTextEntry1] : 58 M with pmh of htn, pulmonary nodules, thyroid mass  s/p hemithyroidectomy in 12/2018, smoking presents for \par \par # Thyroid mass s/p hemithyroidectomy, pathology positive for hyperplasia/no tumor seen\par - TSH wnl\par - c/w levothyroxine\par - no symptoms, following ENT \par \par # L Eustachian tube dysfunction:\par - evaluated by ENT, ear discomfort due ET tube dysfunction \par - stable with flonase\par - hemithyroidectomy scheduled for 12/17\par \par # lung nodules\par - repeat CT lungs q6 months\par - f/u with pulm\par - has an appointment with Dr. Das, will get CR prior to visit\par \par # former smoker, wuit several months ago\par \par # HTN- controlled \par - today's visit /85\par - c/w norvasc, aspirin\par \par # DLD\par - c/w statin\par - LDL 72\par \par # large R thigh varicose veins\par - following with vasc sx, reccomended stab phlebectomy, pt not interested currently \par - compression stockings with vasc sx f/u in 1 year \par \par # skin nodules poss lipomas vs seborrheic cysts\par - following with derm \par \par # chronic lbp, joint pain\par - c/w meloxicam, robaxin prn\par \par # Low back pain/ foot OA\par - c/w robaxin prn; c/w gabapentin\par \par # HCM\par - colonoscopy done 2010?, will give referral when he comes back in 6 months, pt would like FIT test, deferring colonsocopy for now\par - pneumococcal vaccine at 65\par - repeat low dose CT lungs in June 2018, he has script\par - RTC in 6 months\par - flu vaccine given today 10/25

## 2019-11-19 ENCOUNTER — OUTPATIENT (OUTPATIENT)
Dept: OUTPATIENT SERVICES | Facility: HOSPITAL | Age: 59
LOS: 1 days | Discharge: HOME | End: 2019-11-19

## 2019-11-19 ENCOUNTER — APPOINTMENT (OUTPATIENT)
Dept: DERMATOLOGY | Facility: CLINIC | Age: 59
End: 2019-11-19
Payer: MEDICARE

## 2019-11-19 ENCOUNTER — APPOINTMENT (OUTPATIENT)
Dept: PODIATRY | Facility: CLINIC | Age: 59
End: 2019-11-19
Payer: MEDICARE

## 2019-11-19 VITALS
WEIGHT: 201 LBS | SYSTOLIC BLOOD PRESSURE: 118 MMHG | HEART RATE: 62 BPM | BODY MASS INDEX: 27.22 KG/M2 | HEIGHT: 72 IN | DIASTOLIC BLOOD PRESSURE: 72 MMHG

## 2019-11-19 DIAGNOSIS — L30.9 DERMATITIS, UNSPECIFIED: ICD-10-CM

## 2019-11-19 DIAGNOSIS — Z96.643 PRESENCE OF ARTIFICIAL HIP JOINT, BILATERAL: Chronic | ICD-10-CM

## 2019-11-19 DIAGNOSIS — Z98.890 OTHER SPECIFIED POSTPROCEDURAL STATES: Chronic | ICD-10-CM

## 2019-11-19 PROCEDURE — 99212 OFFICE O/P EST SF 10 MIN: CPT

## 2019-11-19 PROCEDURE — 20605 DRAIN/INJ JOINT/BURSA W/O US: CPT

## 2019-11-19 NOTE — END OF VISIT
[] : Resident [FreeTextEntry3] : PENILE LESIONS APPEAR TO BE PROMINENT FOLLICLES WITH SLIGHT DERMATITIS

## 2019-11-19 NOTE — END OF VISIT
[] : Resident [Resident] : Resident [FreeTextEntry3] : agree with above note.  \par Was present and instructing resident during visit. \par All Procedure performed under direct supervision.  \par EILEEN Fleming DPM\par  [FreeTextEntry2] : agree with above note.  \par Was present and instructing resident during visit. \par All Procedure performed under direct supervision.  \par EILEEN Fleming DPM\par

## 2019-11-19 NOTE — ASSESSMENT
[FreeTextEntry1] : 59 y.o man with stable seborrheic keratosis + angiomas on his chest comes to day complaining of penile shaft skin lesions that looks like small pimples. he denies any sexual intercourse, penile discharge, trauma\par no itchiness or soreness \par \par plan: hydrocortisone cream 1 % twice a day to affected area for 14 days

## 2019-11-19 NOTE — HISTORY OF PRESENT ILLNESS
[de-identified] : 57 yo man  with hx of htn, pulmonary nodules, thyroid mass s/p hemithyroidectomy, smoking presents because of persistent stable numerous bumps on his chest  and shoulder. He says these lesions has been there since his teen years and has not changed in character, size or number. No discharge and no pain.\par today he is concerned about two new lesions on his penis shaft that he noticed few months ago\par He has history of seborrheic keratosis ( s/p cryotherapy years ago) and skin cystic lesions since childhood. \par   [FreeTextEntry1] : numerous skin bumps

## 2019-11-19 NOTE — PHYSICAL EXAM
[General Appearance - Alert] : alert [General Appearance - Well Nourished] : well nourished [General Appearance - In No Acute Distress] : in no acute distress [General Appearance - Well-Appearing] : healthy appearing [General Appearance - Well Developed] : well developed [Full Pulse] : the pedal pulses are present [Edema] : there was no peripheral edema [Nail Clubbing] : no clubbing  or cyanosis of the fingernails [Musculoskeletal - Swelling] : no joint swelling seen [Motor Tone] : muscle strength and tone were normal [Skin Color & Pigmentation] : normal skin color and pigmentation [Skin Turgor] : normal skin turgor [Right Foot Was Examined] : The right foot was examined [] : no rash [Left Foot Was Examined] : The left foot was examined [Normal Appearance] : normal in appearance [Tenderness] : tender [Erythema] : not erythematous [Swelling] : not swollen [Normal in Appearance] : not normal in appearance [Tenderness] : tender [Erythema] : not erythematous [Normal] : normal [2+] : 2+ in the dorsalis pedis [Position Sense Dec.] : normal position sense at the level of the toes [Vibration Dec.] : normal vibratory sensation at the level of the toes [Diminished Throughout Right Foot] : normal tactile sensation with monofilament testing throughout right foot [Diminished Throughout Left Foot] : normal tactile sensation with monofilament testing throughout left foot [FreeTextEntry1] : dorsum tarsal metatarsal joint  [FreeTextEntry5] : dorsal tarsal metatarsal joint  [Deep Tendon Reflexes (DTR)] : deep tendon reflexes were 2+ and symmetric [Sensation] : the sensory exam was normal to light touch and pinprick [No Focal Deficits] : no focal deficits [Oriented To Time, Place, And Person] : oriented to person, place, and time [Impaired Insight] : insight and judgment were intact [Affect] : the affect was normal

## 2019-11-19 NOTE — HEALTH RISK ASSESSMENT
[] : Yes [No falls in past year] : Patient reported no falls in the past year [No] : No [0] : 2) Feeling down, depressed, or hopeless: Not at all (0)

## 2019-11-19 NOTE — ASSESSMENT
[FreeTextEntry1] : Patient examined, history and chart reviewed\par  discussed complications and benefits of injection in detail  patient consented to injection right foot \par injection of right foot with 0.5 cc betamethasone and 1cc of lidocaine 2%, patient demonstrated verbal \par Follow up in 4 weeks   Acute pain of right shoulder

## 2019-12-01 ENCOUNTER — FORM ENCOUNTER (OUTPATIENT)
Age: 59
End: 2019-12-01

## 2019-12-02 ENCOUNTER — OUTPATIENT (OUTPATIENT)
Dept: OUTPATIENT SERVICES | Facility: HOSPITAL | Age: 59
LOS: 1 days | Discharge: HOME | End: 2019-12-02
Payer: MEDICARE

## 2019-12-02 DIAGNOSIS — R91.8 OTHER NONSPECIFIC ABNORMAL FINDING OF LUNG FIELD: ICD-10-CM

## 2019-12-02 DIAGNOSIS — Z96.643 PRESENCE OF ARTIFICIAL HIP JOINT, BILATERAL: Chronic | ICD-10-CM

## 2019-12-02 DIAGNOSIS — Z98.890 OTHER SPECIFIED POSTPROCEDURAL STATES: Chronic | ICD-10-CM

## 2019-12-02 PROCEDURE — 71250 CT THORAX DX C-: CPT | Mod: 26

## 2019-12-08 ENCOUNTER — LABORATORY RESULT (OUTPATIENT)
Age: 59
End: 2019-12-08

## 2019-12-16 ENCOUNTER — OUTPATIENT (OUTPATIENT)
Dept: OUTPATIENT SERVICES | Facility: HOSPITAL | Age: 59
LOS: 1 days | Discharge: HOME | End: 2019-12-16

## 2019-12-16 DIAGNOSIS — Z96.643 PRESENCE OF ARTIFICIAL HIP JOINT, BILATERAL: Chronic | ICD-10-CM

## 2019-12-16 DIAGNOSIS — Z98.890 OTHER SPECIFIED POSTPROCEDURAL STATES: Chronic | ICD-10-CM

## 2019-12-17 ENCOUNTER — OUTPATIENT (OUTPATIENT)
Dept: OUTPATIENT SERVICES | Facility: HOSPITAL | Age: 59
LOS: 1 days | Discharge: HOME | End: 2019-12-17

## 2019-12-17 ENCOUNTER — APPOINTMENT (OUTPATIENT)
Dept: PODIATRY | Facility: CLINIC | Age: 59
End: 2019-12-17
Payer: MEDICARE

## 2019-12-17 VITALS
DIASTOLIC BLOOD PRESSURE: 82 MMHG | BODY MASS INDEX: 27.09 KG/M2 | HEIGHT: 72 IN | WEIGHT: 200 LBS | HEART RATE: 62 BPM | SYSTOLIC BLOOD PRESSURE: 132 MMHG

## 2019-12-17 DIAGNOSIS — Z98.890 OTHER SPECIFIED POSTPROCEDURAL STATES: Chronic | ICD-10-CM

## 2019-12-17 DIAGNOSIS — Z96.643 PRESENCE OF ARTIFICIAL HIP JOINT, BILATERAL: Chronic | ICD-10-CM

## 2019-12-17 PROCEDURE — 99213 OFFICE O/P EST LOW 20 MIN: CPT | Mod: 25

## 2019-12-17 PROCEDURE — 20605 DRAIN/INJ JOINT/BURSA W/O US: CPT

## 2019-12-26 NOTE — PHYSICAL EXAM
[General Appearance - Alert] : alert [General Appearance - In No Acute Distress] : in no acute distress [General Appearance - Well Nourished] : well nourished [General Appearance - Well Developed] : well developed [General Appearance - Well-Appearing] : healthy appearing [Full Pulse] : the pedal pulses are present [Edema] : there was no peripheral edema [Nail Clubbing] : no clubbing  or cyanosis of the fingernails [Musculoskeletal - Swelling] : no joint swelling seen [Motor Tone] : muscle strength and tone were normal [Skin Color & Pigmentation] : normal skin color and pigmentation [Skin Turgor] : normal skin turgor [] : no rash [Right Foot Was Examined] : The right foot was examined [Left Foot Was Examined] : The left foot was examined [Normal Appearance] : normal in appearance [Tenderness] : tender [Tenderness] : tender [Normal] : normal [2+] : 2+ in the dorsalis pedis [Sensation] : the sensory exam was normal to light touch and pinprick [No Focal Deficits] : no focal deficits [Deep Tendon Reflexes (DTR)] : deep tendon reflexes were 2+ and symmetric [Oriented To Time, Place, And Person] : oriented to person, place, and time [Impaired Insight] : insight and judgment were intact [Affect] : the affect was normal [Erythema] : not erythematous [Swelling] : not swollen [Normal in Appearance] : not normal in appearance [Position Sense Dec.] : normal position sense at the level of the toes [Vibration Dec.] : normal vibratory sensation at the level of the toes [Diminished Throughout Left Foot] : normal tactile sensation with monofilament testing throughout left foot [Diminished Throughout Right Foot] : normal tactile sensation with monofilament testing throughout right foot [FreeTextEntry1] :  hyperkeratotic skin noted. [FreeTextEntry5] : dorsal tarsal metatarsal joint

## 2019-12-26 NOTE — HISTORY OF PRESENT ILLNESS
[FreeTextEntry1] : Patient returns to clinic for follow up on pain of the dorsum of his right foot. Patient states that the pain was relieved for 4 weeks because of the injection given to him at the last visit. Patient is interested in another injection at the same site.pt states that injection is helping at this time and no surgical intervention at this time. pt states that no pain on the dorsum of the right foot today. Patient denies NVFC and denies SOB at this time.\par

## 2020-01-14 ENCOUNTER — OUTPATIENT (OUTPATIENT)
Dept: OUTPATIENT SERVICES | Facility: HOSPITAL | Age: 60
LOS: 1 days | Discharge: HOME | End: 2020-01-14

## 2020-01-14 ENCOUNTER — APPOINTMENT (OUTPATIENT)
Dept: PODIATRY | Facility: CLINIC | Age: 60
End: 2020-01-14
Payer: MEDICARE

## 2020-01-14 DIAGNOSIS — Z98.890 OTHER SPECIFIED POSTPROCEDURAL STATES: Chronic | ICD-10-CM

## 2020-01-14 DIAGNOSIS — Z96.643 PRESENCE OF ARTIFICIAL HIP JOINT, BILATERAL: Chronic | ICD-10-CM

## 2020-01-14 PROCEDURE — 20605 DRAIN/INJ JOINT/BURSA W/O US: CPT

## 2020-01-14 NOTE — PHYSICAL EXAM
[General Appearance - Alert] : alert [General Appearance - In No Acute Distress] : in no acute distress [General Appearance - Well Nourished] : well nourished [General Appearance - Well Developed] : well developed [General Appearance - Well-Appearing] : healthy appearing [Full Pulse] : the pedal pulses are present [Nail Clubbing] : no clubbing  or cyanosis of the fingernails [Edema] : there was no peripheral edema [Motor Tone] : muscle strength and tone were normal [Musculoskeletal - Swelling] : no joint swelling seen [Skin Color & Pigmentation] : normal skin color and pigmentation [Skin Turgor] : normal skin turgor [] : no rash [Right Foot Was Examined] : The right foot was examined [Left Foot Was Examined] : The left foot was examined [Normal Appearance] : normal in appearance [Tenderness] : tender [Normal] : normal [2+] : 2+ in the posterior tibialis [Deep Tendon Reflexes (DTR)] : deep tendon reflexes were 2+ and symmetric [Sensation] : the sensory exam was normal to light touch and pinprick [No Focal Deficits] : no focal deficits [Impaired Insight] : insight and judgment were intact [Oriented To Time, Place, And Person] : oriented to person, place, and time [Affect] : the affect was normal [Erythema] : not erythematous [Normal in Appearance] : not normal in appearance [Swelling] : not swollen [Vibration Dec.] : normal vibratory sensation at the level of the toes [Position Sense Dec.] : normal position sense at the level of the toes [Diminished Throughout Right Foot] : normal tactile sensation with monofilament testing throughout right foot [Diminished Throughout Left Foot] : normal tactile sensation with monofilament testing throughout left foot [FreeTextEntry1] :  hyperkeratotic skin noted. [FreeTextEntry5] : dorsal tarsal metatarsal joint

## 2020-02-11 ENCOUNTER — APPOINTMENT (OUTPATIENT)
Dept: PODIATRY | Facility: CLINIC | Age: 60
End: 2020-02-11
Payer: MEDICARE

## 2020-02-11 ENCOUNTER — OUTPATIENT (OUTPATIENT)
Dept: OUTPATIENT SERVICES | Facility: HOSPITAL | Age: 60
LOS: 1 days | Discharge: HOME | End: 2020-02-11

## 2020-02-11 DIAGNOSIS — Z96.643 PRESENCE OF ARTIFICIAL HIP JOINT, BILATERAL: Chronic | ICD-10-CM

## 2020-02-11 DIAGNOSIS — Z98.890 OTHER SPECIFIED POSTPROCEDURAL STATES: Chronic | ICD-10-CM

## 2020-02-11 PROCEDURE — 20605 DRAIN/INJ JOINT/BURSA W/O US: CPT | Mod: RT

## 2020-02-26 NOTE — PHYSICAL EXAM
[General Appearance - Alert] : alert [General Appearance - Well Nourished] : well nourished [General Appearance - In No Acute Distress] : in no acute distress [General Appearance - Well Developed] : well developed [General Appearance - Well-Appearing] : healthy appearing [Full Pulse] : the pedal pulses are present [Edema] : there was no peripheral edema [Nail Clubbing] : no clubbing  or cyanosis of the fingernails [Musculoskeletal - Swelling] : no joint swelling seen [Motor Tone] : muscle strength and tone were normal [Skin Color & Pigmentation] : normal skin color and pigmentation [Skin Turgor] : normal skin turgor [Right Foot Was Examined] : The right foot was examined [] : no rash [Left Foot Was Examined] : The left foot was examined [Normal Appearance] : normal in appearance [Tenderness] : tender [Normal] : normal [2+] : 2+ in the posterior tibialis [Deep Tendon Reflexes (DTR)] : deep tendon reflexes were 2+ and symmetric [Sensation] : the sensory exam was normal to light touch and pinprick [No Focal Deficits] : no focal deficits [Oriented To Time, Place, And Person] : oriented to person, place, and time [Impaired Insight] : insight and judgment were intact [Affect] : the affect was normal [Erythema] : not erythematous [Normal in Appearance] : not normal in appearance [Position Sense Dec.] : normal position sense at the level of the toes [Swelling] : not swollen [Vibration Dec.] : normal vibratory sensation at the level of the toes [Diminished Throughout Right Foot] : normal tactile sensation with monofilament testing throughout right foot [Diminished Throughout Left Foot] : normal tactile sensation with monofilament testing throughout left foot [FreeTextEntry1] : dorsum tarsal metatarsal joint  [FreeTextEntry5] : dorsal tarsal metatarsal joint

## 2020-02-28 ENCOUNTER — OUTPATIENT (OUTPATIENT)
Dept: OUTPATIENT SERVICES | Facility: HOSPITAL | Age: 60
LOS: 1 days | Discharge: HOME | End: 2020-02-28

## 2020-02-28 DIAGNOSIS — Z96.643 PRESENCE OF ARTIFICIAL HIP JOINT, BILATERAL: Chronic | ICD-10-CM

## 2020-02-28 DIAGNOSIS — Z98.890 OTHER SPECIFIED POSTPROCEDURAL STATES: Chronic | ICD-10-CM

## 2020-03-10 ENCOUNTER — APPOINTMENT (OUTPATIENT)
Dept: PODIATRY | Facility: CLINIC | Age: 60
End: 2020-03-10
Payer: MEDICARE

## 2020-03-10 ENCOUNTER — OUTPATIENT (OUTPATIENT)
Dept: OUTPATIENT SERVICES | Facility: HOSPITAL | Age: 60
LOS: 1 days | Discharge: HOME | End: 2020-03-10

## 2020-03-10 DIAGNOSIS — Z96.643 PRESENCE OF ARTIFICIAL HIP JOINT, BILATERAL: Chronic | ICD-10-CM

## 2020-03-10 DIAGNOSIS — Z98.890 OTHER SPECIFIED POSTPROCEDURAL STATES: Chronic | ICD-10-CM

## 2020-03-10 PROCEDURE — 20605 DRAIN/INJ JOINT/BURSA W/O US: CPT | Mod: RT

## 2020-03-11 NOTE — PHYSICAL EXAM
[General Appearance - Alert] : alert [General Appearance - In No Acute Distress] : in no acute distress [General Appearance - Well Nourished] : well nourished [General Appearance - Well Developed] : well developed [General Appearance - Well-Appearing] : healthy appearing [Full Pulse] : the pedal pulses are present [Edema] : there was no peripheral edema [Nail Clubbing] : no clubbing  or cyanosis of the fingernails [Musculoskeletal - Swelling] : no joint swelling seen [Motor Tone] : muscle strength and tone were normal [Skin Color & Pigmentation] : normal skin color and pigmentation [Skin Turgor] : normal skin turgor [] : no rash [Right Foot Was Examined] : The right foot was examined [Left Foot Was Examined] : The left foot was examined [Normal Appearance] : normal in appearance [Tenderness] : tender [Erythema] : not erythematous [Normal in Appearance] : not normal in appearance [Swelling] : not swollen [Normal] : normal [2+] : 2+ in the dorsalis pedis [Vibration Dec.] : normal vibratory sensation at the level of the toes [Position Sense Dec.] : normal position sense at the level of the toes [Diminished Throughout Right Foot] : normal tactile sensation with monofilament testing throughout right foot [Diminished Throughout Left Foot] : normal tactile sensation with monofilament testing throughout left foot [FreeTextEntry1] : dorsum tarsal metatarsal joint  [FreeTextEntry5] : dorsal tarsal metatarsal joint  [Deep Tendon Reflexes (DTR)] : deep tendon reflexes were 2+ and symmetric [Sensation] : the sensory exam was normal to light touch and pinprick [No Focal Deficits] : no focal deficits [Oriented To Time, Place, And Person] : oriented to person, place, and time [Impaired Insight] : insight and judgment were intact [Affect] : the affect was normal

## 2020-04-13 ENCOUNTER — LABORATORY RESULT (OUTPATIENT)
Age: 60
End: 2020-04-13

## 2020-04-24 LAB
ALBUMIN SERPL ELPH-MCNC: 4.3 G/DL
ALP BLD-CCNC: 91 U/L
ALT SERPL-CCNC: 15 U/L
ANION GAP SERPL CALC-SCNC: 14 MMOL/L
AST SERPL-CCNC: 19 U/L
BASOPHILS # BLD AUTO: 0.05 K/UL
BASOPHILS NFR BLD AUTO: 0.5 %
BILIRUB SERPL-MCNC: 0.5 MG/DL
BUN SERPL-MCNC: 10 MG/DL
CALCIUM SERPL-MCNC: 9.3 MG/DL
CHLORIDE SERPL-SCNC: 104 MMOL/L
CHOLEST SERPL-MCNC: 129 MG/DL
CHOLEST/HDLC SERPL: 2.6 RATIO
CO2 SERPL-SCNC: 26 MMOL/L
CREAT SERPL-MCNC: 0.9 MG/DL
EOSINOPHIL # BLD AUTO: 0.29 K/UL
EOSINOPHIL NFR BLD AUTO: 3 %
ESTIMATED AVERAGE GLUCOSE: 111 MG/DL
GLUCOSE SERPL-MCNC: 89 MG/DL
HBA1C MFR BLD HPLC: 5.5 %
HCT VFR BLD CALC: 49.7 %
HDLC SERPL-MCNC: 50 MG/DL
HGB BLD-MCNC: 16.8 G/DL
IMM GRANULOCYTES NFR BLD AUTO: 0.1 %
LDLC SERPL CALC-MCNC: 67 MG/DL
LYMPHOCYTES # BLD AUTO: 3.89 K/UL
LYMPHOCYTES NFR BLD AUTO: 40.8 %
MAN DIFF?: NORMAL
MCHC RBC-ENTMCNC: 33.3 PG
MCHC RBC-ENTMCNC: 33.8 G/DL
MCV RBC AUTO: 98.6 FL
MONOCYTES # BLD AUTO: 0.64 K/UL
MONOCYTES NFR BLD AUTO: 6.7 %
NEUTROPHILS # BLD AUTO: 4.66 K/UL
NEUTROPHILS NFR BLD AUTO: 48.9 %
PLATELET # BLD AUTO: 180 K/UL
POTASSIUM SERPL-SCNC: 4.3 MMOL/L
PROT SERPL-MCNC: 6.8 G/DL
RBC # BLD: 5.04 M/UL
RBC # FLD: 13.8 %
SODIUM SERPL-SCNC: 144 MMOL/L
TRIGL SERPL-MCNC: 98 MG/DL
TSH SERPL-ACNC: 4.71 UIU/ML
WBC # FLD AUTO: 9.54 K/UL

## 2020-06-18 ENCOUNTER — OUTPATIENT (OUTPATIENT)
Dept: OUTPATIENT SERVICES | Facility: HOSPITAL | Age: 60
LOS: 1 days | Discharge: HOME | End: 2020-06-18

## 2020-06-18 DIAGNOSIS — K05.6 PERIODONTAL DISEASE, UNSPECIFIED: ICD-10-CM

## 2020-06-18 DIAGNOSIS — Z96.643 PRESENCE OF ARTIFICIAL HIP JOINT, BILATERAL: Chronic | ICD-10-CM

## 2020-06-18 DIAGNOSIS — Z98.890 OTHER SPECIFIED POSTPROCEDURAL STATES: Chronic | ICD-10-CM

## 2020-06-23 ENCOUNTER — APPOINTMENT (OUTPATIENT)
Dept: PODIATRY | Facility: CLINIC | Age: 60
End: 2020-06-23
Payer: MEDICARE

## 2020-06-23 ENCOUNTER — APPOINTMENT (OUTPATIENT)
Dept: INTERNAL MEDICINE | Facility: CLINIC | Age: 60
End: 2020-06-23

## 2020-06-23 ENCOUNTER — OUTPATIENT (OUTPATIENT)
Dept: OUTPATIENT SERVICES | Facility: HOSPITAL | Age: 60
LOS: 1 days | Discharge: HOME | End: 2020-06-23

## 2020-06-23 VITALS
SYSTOLIC BLOOD PRESSURE: 127 MMHG | WEIGHT: 202 LBS | DIASTOLIC BLOOD PRESSURE: 85 MMHG | HEIGHT: 72 IN | TEMPERATURE: 97.3 F | BODY MASS INDEX: 27.36 KG/M2

## 2020-06-23 DIAGNOSIS — Z98.890 OTHER SPECIFIED POSTPROCEDURAL STATES: Chronic | ICD-10-CM

## 2020-06-23 DIAGNOSIS — Z96.643 PRESENCE OF ARTIFICIAL HIP JOINT, BILATERAL: Chronic | ICD-10-CM

## 2020-06-23 PROCEDURE — 20605 DRAIN/INJ JOINT/BURSA W/O US: CPT

## 2020-06-27 NOTE — PHYSICAL EXAM
[General Appearance - Alert] : alert [General Appearance - Well Nourished] : well nourished [General Appearance - Well Developed] : well developed [General Appearance - In No Acute Distress] : in no acute distress [General Appearance - Well-Appearing] : healthy appearing [Edema] : there was no peripheral edema [Full Pulse] : the pedal pulses are present [Musculoskeletal - Swelling] : no joint swelling seen [Nail Clubbing] : no clubbing  or cyanosis of the fingernails [Motor Tone] : muscle strength and tone were normal [Skin Color & Pigmentation] : normal skin color and pigmentation [Skin Turgor] : normal skin turgor [] : no rash [Right Foot Was Examined] : The right foot was examined [Normal Appearance] : normal in appearance [Left Foot Was Examined] : The left foot was examined [Tenderness] : tender [Normal] : normal [2+] : 2+ in the dorsalis pedis [Deep Tendon Reflexes (DTR)] : deep tendon reflexes were 2+ and symmetric [Oriented To Time, Place, And Person] : oriented to person, place, and time [Sensation] : the sensory exam was normal to light touch and pinprick [No Focal Deficits] : no focal deficits [Affect] : the affect was normal [Impaired Insight] : insight and judgment were intact [Erythema] : not erythematous [Normal in Appearance] : not normal in appearance [Swelling] : not swollen [Position Sense Dec.] : normal position sense at the level of the toes [Vibration Dec.] : normal vibratory sensation at the level of the toes [Diminished Throughout Right Foot] : normal tactile sensation with monofilament testing throughout right foot [Diminished Throughout Left Foot] : normal tactile sensation with monofilament testing throughout left foot [FreeTextEntry1] : dorsum tarsal metatarsal joint  [FreeTextEntry5] : dorsal tarsal metatarsal joint

## 2020-06-27 NOTE — HISTORY OF PRESENT ILLNESS
[FreeTextEntry1] : Patient returns to clinic for follow up on pain of the dorsum of his right foot. Patient states that the pain was relieved for 4 weeks because of the injection given to him at the last visit. Patient is interested in another injection at the same site.pt states that injection is helping at this time and no surgical intervention at this time. Patient denies NVFC and denies SOB at this time. Pt. denies any other pedal complaints at this time. \par

## 2020-07-21 ENCOUNTER — APPOINTMENT (OUTPATIENT)
Dept: PODIATRY | Facility: CLINIC | Age: 60
End: 2020-07-21
Payer: MEDICARE

## 2020-07-21 ENCOUNTER — OUTPATIENT (OUTPATIENT)
Dept: OUTPATIENT SERVICES | Facility: HOSPITAL | Age: 60
LOS: 1 days | Discharge: HOME | End: 2020-07-21

## 2020-07-21 DIAGNOSIS — Z98.890 OTHER SPECIFIED POSTPROCEDURAL STATES: Chronic | ICD-10-CM

## 2020-07-21 DIAGNOSIS — Z96.643 PRESENCE OF ARTIFICIAL HIP JOINT, BILATERAL: Chronic | ICD-10-CM

## 2020-07-21 PROCEDURE — 99213 OFFICE O/P EST LOW 20 MIN: CPT | Mod: 25

## 2020-07-21 PROCEDURE — 20605 DRAIN/INJ JOINT/BURSA W/O US: CPT

## 2020-07-22 NOTE — PROCEDURE
[FreeTextEntry1] : Physical Exam\par Constitutional: alert, in no acute distress, well nourished, well developed, healthy appearing and normal voice and communication. \par Vascular: the pedal pulses are present . there was no peripheral edema. DP/PT 2/4 b/l; CFT<3 sec x10; pedal hair present; no edema no varicosities. \par Musculoskeletal: no clubbing or cyanosis of the fingernails, no joint swelling seen and muscle strength and tone were normal . limb length discrepancy (right<left); pain on palpation of right midfoot and with midtarsal joint range of motion. \par Skin: normal skin color and pigmentation, normal skin turgor and no rash . Mass noted on the dorsum aspect of the foot. No signs of acute infection. \par Foot Exam: The right foot was examined, The left foot was examined. The right foot was tender and dorsum tarsal metatarsal joint, but normal in appearance, not swollen and not erythematous. The right toes were normal. Capillary refills for the right foot were 2+ in the posterior tibialis and 2+ in the dorsalis pedis. The sensory exam of the right foot showed normal vibratory sensation at the level of the toes and normal position sense at the level of the toes. The left foot was not normal in appearance, tender and dorsal tarsal metatarsal joint, but not swollen and not erythematous. The left toes were normal. Capillary refills for the left foot were 2+ in the posterior tibialis and 2+ in the dorsalis pedis. The sensory exam of the left foot showed normal vibratory sensation at the level of the toes and normal position sense at the level of the toes. \par Monofilament Testing: normal tactile sensation with monofilament testing throughout right foot, normal tactile sensation with monofilament testing throughout left foot. \par Neurological: deep tendon reflexes were 2+ and symmetric, the sensory exam was normal to light touch and pinprick and no focal deficits. \par Psychiatric: oriented to person, place, and time, insight and judgment were intact and the affect was normal. \par  \par Assessment\par Acute right ankle pain (719.47,338.19) (M25.571)\par Right foot pain (729.5) (M79.671)\par \par Patient examined, history and chart reviewed\par discussed complications and benefits of injection in detail patient consented to injection right foot\par injection of right foot with 0.5 cc betamethasone and 1cc of lidocaine 2%, patient demonstrated verbal \par Follow up in 4 weeks. \par

## 2020-07-22 NOTE — PHYSICAL EXAM
[General Appearance - Alert] : alert [General Appearance - In No Acute Distress] : in no acute distress [Edema] : there was no peripheral edema [Full Pulse] : the pedal pulses are present [Abnormal Walk] : normal gait [Musculoskeletal - Swelling] : no joint swelling seen [Motor Tone] : muscle strength and tone were normal [Nail Clubbing] : no clubbing  or cyanosis of the fingernails [Skin Turgor] : normal skin turgor [Skin Color & Pigmentation] : normal skin color and pigmentation [Deep Tendon Reflexes (DTR)] : deep tendon reflexes were 2+ and symmetric [Sensation] : the sensory exam was normal to light touch and pinprick [] : no rash [Affect] : the affect was normal [Impaired Insight] : insight and judgment were intact [Oriented To Time, Place, And Person] : oriented to person, place, and time [No Focal Deficits] : no focal deficits

## 2020-07-29 ENCOUNTER — OUTPATIENT (OUTPATIENT)
Dept: OUTPATIENT SERVICES | Facility: HOSPITAL | Age: 60
LOS: 1 days | Discharge: HOME | End: 2020-07-29

## 2020-07-29 DIAGNOSIS — Z96.643 PRESENCE OF ARTIFICIAL HIP JOINT, BILATERAL: Chronic | ICD-10-CM

## 2020-07-29 DIAGNOSIS — Z98.890 OTHER SPECIFIED POSTPROCEDURAL STATES: Chronic | ICD-10-CM

## 2020-08-06 ENCOUNTER — LABORATORY RESULT (OUTPATIENT)
Age: 60
End: 2020-08-06

## 2020-08-09 LAB
25(OH)D3 SERPL-MCNC: 43 NG/ML
ALBUMIN SERPL ELPH-MCNC: 4.3 G/DL
ALP BLD-CCNC: 83 U/L
ALT SERPL-CCNC: 20 U/L
ANION GAP SERPL CALC-SCNC: 13 MMOL/L
AST SERPL-CCNC: 23 U/L
BASOPHILS # BLD AUTO: 0.05 K/UL
BASOPHILS NFR BLD AUTO: 0.6 %
BILIRUB SERPL-MCNC: 0.5 MG/DL
BUN SERPL-MCNC: 10 MG/DL
CALCIUM SERPL-MCNC: 9.5 MG/DL
CHLORIDE SERPL-SCNC: 106 MMOL/L
CHOLEST SERPL-MCNC: 126 MG/DL
CHOLEST/HDLC SERPL: 2.5 RATIO
CO2 SERPL-SCNC: 24 MMOL/L
CREAT SERPL-MCNC: 0.9 MG/DL
EOSINOPHIL # BLD AUTO: 0.23 K/UL
EOSINOPHIL NFR BLD AUTO: 2.6 %
ESTIMATED AVERAGE GLUCOSE: 111 MG/DL
GLUCOSE SERPL-MCNC: 102 MG/DL
HBA1C MFR BLD HPLC: 5.5 %
HCT VFR BLD CALC: 51.6 %
HDLC SERPL-MCNC: 50 MG/DL
HGB BLD-MCNC: 16.8 G/DL
IMM GRANULOCYTES NFR BLD AUTO: 0.2 %
LDLC SERPL CALC-MCNC: 62 MG/DL
LYMPHOCYTES # BLD AUTO: 3.57 K/UL
LYMPHOCYTES NFR BLD AUTO: 40.5 %
MAN DIFF?: NORMAL
MCHC RBC-ENTMCNC: 32.6 G/DL
MCHC RBC-ENTMCNC: 32.7 PG
MCV RBC AUTO: 100.6 FL
MONOCYTES # BLD AUTO: 0.67 K/UL
MONOCYTES NFR BLD AUTO: 7.6 %
NEUTROPHILS # BLD AUTO: 4.27 K/UL
NEUTROPHILS NFR BLD AUTO: 48.5 %
PLATELET # BLD AUTO: 176 K/UL
POTASSIUM SERPL-SCNC: 4.6 MMOL/L
PROT SERPL-MCNC: 6.7 G/DL
RBC # BLD: 5.13 M/UL
RBC # FLD: 14 %
SODIUM SERPL-SCNC: 143 MMOL/L
TRIGL SERPL-MCNC: 76 MG/DL
TSH SERPL-ACNC: 4.45 UIU/ML
WBC # FLD AUTO: 8.81 K/UL

## 2020-08-18 ENCOUNTER — OUTPATIENT (OUTPATIENT)
Dept: OUTPATIENT SERVICES | Facility: HOSPITAL | Age: 60
LOS: 1 days | Discharge: HOME | End: 2020-08-18

## 2020-08-18 ENCOUNTER — APPOINTMENT (OUTPATIENT)
Dept: PODIATRY | Facility: CLINIC | Age: 60
End: 2020-08-18
Payer: MEDICARE

## 2020-08-18 DIAGNOSIS — Z98.890 OTHER SPECIFIED POSTPROCEDURAL STATES: Chronic | ICD-10-CM

## 2020-08-18 DIAGNOSIS — Z96.643 PRESENCE OF ARTIFICIAL HIP JOINT, BILATERAL: Chronic | ICD-10-CM

## 2020-08-18 PROCEDURE — 20605 DRAIN/INJ JOINT/BURSA W/O US: CPT | Mod: 59

## 2020-08-19 ENCOUNTER — OUTPATIENT (OUTPATIENT)
Dept: OUTPATIENT SERVICES | Facility: HOSPITAL | Age: 60
LOS: 1 days | Discharge: HOME | End: 2020-08-19

## 2020-08-19 ENCOUNTER — APPOINTMENT (OUTPATIENT)
Dept: INTERNAL MEDICINE | Facility: CLINIC | Age: 60
End: 2020-08-19
Payer: MEDICARE

## 2020-08-19 VITALS
BODY MASS INDEX: 26.28 KG/M2 | HEART RATE: 60 BPM | HEIGHT: 72 IN | WEIGHT: 194 LBS | SYSTOLIC BLOOD PRESSURE: 154 MMHG | DIASTOLIC BLOOD PRESSURE: 83 MMHG | TEMPERATURE: 97.4 F

## 2020-08-19 DIAGNOSIS — Z82.49 FAMILY HISTORY OF ISCHEMIC HEART DISEASE AND OTHER DISEASES OF THE CIRCULATORY SYSTEM: ICD-10-CM

## 2020-08-19 DIAGNOSIS — Z80.1 FAMILY HISTORY OF MALIGNANT NEOPLASM OF TRACHEA, BRONCHUS AND LUNG: ICD-10-CM

## 2020-08-19 DIAGNOSIS — Z96.643 PRESENCE OF ARTIFICIAL HIP JOINT, BILATERAL: Chronic | ICD-10-CM

## 2020-08-19 DIAGNOSIS — Z98.890 OTHER SPECIFIED POSTPROCEDURAL STATES: Chronic | ICD-10-CM

## 2020-08-19 DIAGNOSIS — Z87.891 PERSONAL HISTORY OF NICOTINE DEPENDENCE: ICD-10-CM

## 2020-08-19 PROCEDURE — 99213 OFFICE O/P EST LOW 20 MIN: CPT | Mod: GC

## 2020-08-19 NOTE — PHYSICAL EXAM
[No Acute Distress] : no acute distress [Well-Appearing] : well-appearing [Normal Sclera/Conjunctiva] : normal sclera/conjunctiva [Normal Outer Ear/Nose] : the outer ears and nose were normal in appearance [EOMI] : extraocular movements intact [Normal Oropharynx] : the oropharynx was normal [No JVD] : no jugular venous distention [No Respiratory Distress] : no respiratory distress  [Clear to Auscultation] : lungs were clear to auscultation bilaterally [No Lymphadenopathy] : no lymphadenopathy [Normal Rate] : normal rate  [Regular Rhythm] : with a regular rhythm [Normal S1, S2] : normal S1 and S2 [Pedal Pulses Present] : the pedal pulses are present [No Carotid Bruits] : no carotid bruits [No Edema] : there was no peripheral edema [No Palpable Aorta] : no palpable aorta [Soft] : abdomen soft [Non Tender] : non-tender [Non-distended] : non-distended [No HSM] : no HSM [Normal Bowel Sounds] : normal bowel sounds [Coordination Grossly Intact] : coordination grossly intact [Speech Grossly Normal] : speech grossly normal [Alert and Oriented x3] : oriented to person, place, and time [Normal Mood] : the mood was normal

## 2020-08-26 DIAGNOSIS — E04.1 NONTOXIC SINGLE THYROID NODULE: ICD-10-CM

## 2020-08-26 DIAGNOSIS — E03.9 HYPOTHYROIDISM, UNSPECIFIED: ICD-10-CM

## 2020-08-26 DIAGNOSIS — Z80.1 FAMILY HISTORY OF MALIGNANT NEOPLASM OF TRACHEA, BRONCHUS AND LUNG: ICD-10-CM

## 2020-08-26 DIAGNOSIS — Z82.49 FAMILY HISTORY OF ISCHEMIC HEART DISEASE AND OTHER DISEASES OF THE CIRCULATORY SYSTEM: ICD-10-CM

## 2020-08-26 DIAGNOSIS — Z87.891 PERSONAL HISTORY OF NICOTINE DEPENDENCE: ICD-10-CM

## 2020-08-26 DIAGNOSIS — E78.5 HYPERLIPIDEMIA, UNSPECIFIED: ICD-10-CM

## 2020-08-26 DIAGNOSIS — I10 ESSENTIAL (PRIMARY) HYPERTENSION: ICD-10-CM

## 2020-08-27 NOTE — PHYSICAL EXAM
[General Appearance - Alert] : alert [General Appearance - In No Acute Distress] : in no acute distress [Full Pulse] : the pedal pulses are present [Edema] : there was no peripheral edema [Nail Clubbing] : no clubbing  or cyanosis of the fingernails [Abnormal Walk] : normal gait [Musculoskeletal - Swelling] : no joint swelling seen [Skin Turgor] : normal skin turgor [] : no rash [Skin Color & Pigmentation] : normal skin color and pigmentation [Motor Tone] : muscle strength and tone were normal [Deep Tendon Reflexes (DTR)] : deep tendon reflexes were 2+ and symmetric [No Focal Deficits] : no focal deficits [Sensation] : the sensory exam was normal to light touch and pinprick [Affect] : the affect was normal [Oriented To Time, Place, And Person] : oriented to person, place, and time [Impaired Insight] : insight and judgment were intact

## 2020-09-08 NOTE — HISTORY OF PRESENT ILLNESS
[FreeTextEntry1] : 6 month f/u [de-identified] : 61 y/o  M with pmh of HTN, DLD,  pulmonary nodules, thyroid mass s/p partial hemithyroidectomy, osteoarthritis and chronic low back pain presents for a 6 month follow up and medication refill. The pt denies any active issues currently.

## 2020-09-08 NOTE — ASSESSMENT
[FreeTextEntry1] : 59 y/o  M with pmh of HTN, DLD,  pulmonary nodules, thyroid mass s/p partial hemithyroidectomy, osteoarthritis and chronic low back pain presents for a 6 month follow up.\par \par # Thyroid mass s/p hemithyroidectomy, pathology positive for hyperplasia/no tumor seen\par - TSH 4.45\par - c/w levothyroxine\par - no symptoms, ENT following\par \par # L Eustachian tube dysfunction:\par - evaluated by ENT, ear discomfort due ET tube dysfunction \par - stable with flonase\par \par \par # lung nodules\par - repeat CT lungs q6 months\par - f/u with pulmonologist\par \par \par # former smoker, quit several months ago\par \par # HTN\par - BP today 154/83 (was rushing to appointment)\par - C/w norvasc, aspirin\par \par # DLD\par - c/w statin\par - LDL 72\par \par # skin nodules- Seborrheic keratosis\par - Derm following\par \par # Chronic Low Back Pain/ Osteoarthritis R. Foot\par - c/w meloxicam, robaxin prn\par - Seen by Podiatry\par \par # Low back pain/ foot OA\par - c/w robaxin prn; c/w gabapentin\par \par # HCM\par - Last colonoscopy done in 2010?, will give referral when he comes back in 6 months, pt would like \par - FIT test done 6 months ago\par -Tdap 2015\par - pneumococcal vaccine at 65\par - One time HIV/HCV screen ordered\par - Follow up in 6 months\par \par \par Documented by: Jaqueline Ng

## 2020-09-08 NOTE — REVIEW OF SYSTEMS
[Joint Pain] : joint pain [Joint Stiffness] : joint stiffness [Back Pain] : back pain [Negative] : ENT [Discharge] : no discharge [Vision Problems] : no vision problems [Palpitations] : no palpitations [Chest Pain] : no chest pain [Orthopena] : no orthopnea [Shortness Of Breath] : no shortness of breath [Cough] : no cough [Wheezing] : no wheezing [Abdominal Pain] : no abdominal pain [Constipation] : no constipation [Vomiting] : no vomiting [Nausea] : no nausea [Incontinence] : no incontinence [Dysuria] : no dysuria [Itching] : no itching [Hematuria] : no hematuria [Dizziness] : no dizziness [Headache] : no headache [Confusion] : no confusion [Suicidal] : not suicidal [Insomnia] : no insomnia

## 2020-09-15 ENCOUNTER — MED ADMIN CHARGE (OUTPATIENT)
Age: 60
End: 2020-09-15

## 2020-09-15 ENCOUNTER — OUTPATIENT (OUTPATIENT)
Dept: OUTPATIENT SERVICES | Facility: HOSPITAL | Age: 60
LOS: 1 days | Discharge: HOME | End: 2020-09-15

## 2020-09-15 ENCOUNTER — APPOINTMENT (OUTPATIENT)
Dept: PODIATRY | Facility: CLINIC | Age: 60
End: 2020-09-15
Payer: MEDICARE

## 2020-09-15 DIAGNOSIS — Z98.890 OTHER SPECIFIED POSTPROCEDURAL STATES: Chronic | ICD-10-CM

## 2020-09-15 DIAGNOSIS — Z23 ENCOUNTER FOR IMMUNIZATION: ICD-10-CM

## 2020-09-15 DIAGNOSIS — Z96.643 PRESENCE OF ARTIFICIAL HIP JOINT, BILATERAL: Chronic | ICD-10-CM

## 2020-09-15 PROCEDURE — 20605 DRAIN/INJ JOINT/BURSA W/O US: CPT

## 2020-09-21 NOTE — ASU PATIENT PROFILE, ADULT - BLOOD AVOIDANCE/RESTRICTIONS, PROFILE
Quality 431: Preventive Care And Screening: Unhealthy Alcohol Use - Screening: Patient screened for unhealthy alcohol use using a single question and scores less than 2 times per year none

## 2020-09-29 NOTE — PHYSICAL EXAM
[General Appearance - Alert] : alert [General Appearance - In No Acute Distress] : in no acute distress [Full Pulse] : the pedal pulses are present [Edema] : there was no peripheral edema [Abnormal Walk] : normal gait [Nail Clubbing] : no clubbing  or cyanosis of the fingernails [Musculoskeletal - Swelling] : no joint swelling seen [Motor Tone] : muscle strength and tone were normal [Skin Color & Pigmentation] : normal skin color and pigmentation [Skin Turgor] : normal skin turgor [] : no rash [Deep Tendon Reflexes (DTR)] : deep tendon reflexes were 2+ and symmetric [Sensation] : the sensory exam was normal to light touch and pinprick [No Focal Deficits] : no focal deficits [Oriented To Time, Place, And Person] : oriented to person, place, and time [Impaired Insight] : insight and judgment were intact [Affect] : the affect was normal

## 2020-10-13 ENCOUNTER — APPOINTMENT (OUTPATIENT)
Dept: PODIATRY | Facility: CLINIC | Age: 60
End: 2020-10-13
Payer: MEDICARE

## 2020-10-13 ENCOUNTER — OUTPATIENT (OUTPATIENT)
Dept: OUTPATIENT SERVICES | Facility: HOSPITAL | Age: 60
LOS: 1 days | Discharge: HOME | End: 2020-10-13

## 2020-10-13 DIAGNOSIS — Z98.890 OTHER SPECIFIED POSTPROCEDURAL STATES: Chronic | ICD-10-CM

## 2020-10-13 DIAGNOSIS — M65.9 SYNOVITIS AND TENOSYNOVITIS, UNSPECIFIED: ICD-10-CM

## 2020-10-13 DIAGNOSIS — Z96.643 PRESENCE OF ARTIFICIAL HIP JOINT, BILATERAL: Chronic | ICD-10-CM

## 2020-10-13 PROCEDURE — 20600 DRAIN/INJ JOINT/BURSA W/O US: CPT | Mod: RT

## 2020-10-14 PROBLEM — M65.9 SYNOVITIS: Status: ACTIVE | Noted: 2020-10-14

## 2020-10-14 NOTE — HISTORY OF PRESENT ILLNESS
[FreeTextEntry1] : Patient returns to clinic for follow up on pain of the dorsum of his right foot. Patient states that the pain was relieved for 4 weeks because of the injection given to him at the last visit. Patient is interested in another injection at the same site.pt states that injection is helping at this time and no surgical intervention at this time. Patient denies NVFC and denies SOB at this time. Pt. denies any other pedal complaints at this time.

## 2020-10-14 NOTE — ASSESSMENT
[FreeTextEntry1] : Physical Exam\par Constitutional: alert and in no acute distress. \par \par Vascular: the pedal pulses are present . there was no peripheral edema. \par \par Musculoskeletal: normal gait, no clubbing or cyanosis of the fingernails, no joint swelling seen and muscle strength and tone were normal. \par \par Skin: normal skin color and pigmentation, normal skin turgor and no rash. \par \par Neurological: deep tendon reflexes were 2+ and symmetric, the sensory exam was normal to light touch and pinprick and no focal deficits. \par \par Psychiatric: oriented to person, place, and time, insight and judgment were intact and the affect was normal. \par  \par Procedure\par Physical Exam\par Constitutional: alert, in no acute distress, well nourished, well developed, healthy appearing and normal voice and communication. \par Vascular: the pedal pulses are present . there was no peripheral edema. DP/PT 2/4 b/l; CFT<3 sec x10; pedal hair present; no edema no varicosities. \par Musculoskeletal: no clubbing or cyanosis of the fingernails, no joint swelling seen and muscle strength and tone were normal . limb length discrepancy (right<left); pain on palpation of right midfoot and with midtarsal joint range of motion. \par Skin: normal skin color and pigmentation, normal skin turgor and no rash . Mass noted on the dorsum aspect of the foot. No signs of acute infection. \par Foot Exam: The right foot was examined, The left foot was examined. The right foot was tender and dorsum tarsal metatarsal joint, but normal in appearance, not swollen and not erythematous. The right toes were normal. Capillary refills for the right foot were 2+ in the posterior tibialis and 2+ in the dorsalis pedis. The sensory exam of the right foot showed normal vibratory sensation at the level of the toes and normal position sense at the level of the toes. The left foot was not normal in appearance, tender and dorsal tarsal metatarsal joint, but not swollen and not erythematous. The left toes were normal. Capillary refills for the left foot were 2+ in the posterior tibialis and 2+ in the dorsalis pedis. The sensory exam of the left foot showed normal vibratory sensation at the level of the toes and normal position sense at the level of the toes. \par Monofilament Testing: normal tactile sensation with monofilament testing throughout right foot, normal tactile sensation with monofilament testing throughout left foot. \par Neurological: deep tendon reflexes were 2+ and symmetric, the sensory exam was normal to light touch and pinprick and no focal deficits. \par Psychiatric: oriented to person, place, and time, insight and judgment were intact and the affect was normal. \par  \par \par Patient examined, history and chart reviewed\par discussed complications and benefits of injection in detail patient consented to injection right foot\par injection of right foot with 0.5 cc betamethasone and 1cc of lidocaine 2%, patient demonstrated verbal \par Follow up in 4 weeks. \par

## 2020-10-14 NOTE — PHYSICAL EXAM
[No Joint Swelling] : no joint swelling [] : no rash [Skin Lesions] : no skin lesions [Oriented To Time, Place, And Person] : oriented to person, place, and time [Impaired Insight] : insight and judgment were intact [FreeTextEntry3] : palpable pedal pulses

## 2020-10-14 NOTE — END OF VISIT
[] : Resident [Resident] : Resident [FreeTextEntry3] : pain on palpation of right midfoot and with midtarsal joint range of motion. \par  [FreeTextEntry2] : periarticular injection performed to the right foot under sterile technique

## 2020-10-20 ENCOUNTER — NON-APPOINTMENT (OUTPATIENT)
Age: 60
End: 2020-10-20

## 2020-10-20 DIAGNOSIS — M79.671 PAIN IN RIGHT FOOT: ICD-10-CM

## 2020-10-20 DIAGNOSIS — M19.079 PRIMARY OSTEOARTHRITIS, UNSPECIFIED ANKLE AND FOOT: ICD-10-CM

## 2020-10-20 DIAGNOSIS — M65.9 SYNOVITIS AND TENOSYNOVITIS, UNSPECIFIED: ICD-10-CM

## 2020-11-24 ENCOUNTER — OUTPATIENT (OUTPATIENT)
Dept: OUTPATIENT SERVICES | Facility: HOSPITAL | Age: 60
LOS: 1 days | Discharge: HOME | End: 2020-11-24

## 2020-11-24 ENCOUNTER — APPOINTMENT (OUTPATIENT)
Dept: PODIATRY | Facility: CLINIC | Age: 60
End: 2020-11-24
Payer: MEDICARE

## 2020-11-24 DIAGNOSIS — Z98.890 OTHER SPECIFIED POSTPROCEDURAL STATES: Chronic | ICD-10-CM

## 2020-11-24 DIAGNOSIS — Z96.643 PRESENCE OF ARTIFICIAL HIP JOINT, BILATERAL: Chronic | ICD-10-CM

## 2020-11-24 PROCEDURE — 20605 DRAIN/INJ JOINT/BURSA W/O US: CPT

## 2020-12-22 ENCOUNTER — APPOINTMENT (OUTPATIENT)
Dept: PODIATRY | Facility: CLINIC | Age: 60
End: 2020-12-22
Payer: MEDICARE

## 2020-12-22 ENCOUNTER — OUTPATIENT (OUTPATIENT)
Dept: OUTPATIENT SERVICES | Facility: HOSPITAL | Age: 60
LOS: 1 days | Discharge: HOME | End: 2020-12-22

## 2020-12-22 DIAGNOSIS — Z96.643 PRESENCE OF ARTIFICIAL HIP JOINT, BILATERAL: Chronic | ICD-10-CM

## 2020-12-22 DIAGNOSIS — Z98.890 OTHER SPECIFIED POSTPROCEDURAL STATES: Chronic | ICD-10-CM

## 2020-12-22 PROCEDURE — 20605 DRAIN/INJ JOINT/BURSA W/O US: CPT

## 2020-12-30 NOTE — PHYSICAL EXAM
[FreeTextEntry3] : palpable pedal pulses  [No Joint Swelling] : no joint swelling [] : no rash [Skin Lesions] : no skin lesions [Oriented To Time, Place, And Person] : oriented to person, place, and time [Impaired Insight] : insight and judgment were intact

## 2021-01-21 ENCOUNTER — APPOINTMENT (OUTPATIENT)
Dept: PODIATRY | Facility: CLINIC | Age: 61
End: 2021-01-21
Payer: MEDICARE

## 2021-01-21 ENCOUNTER — OUTPATIENT (OUTPATIENT)
Dept: OUTPATIENT SERVICES | Facility: HOSPITAL | Age: 61
LOS: 1 days | Discharge: HOME | End: 2021-01-21

## 2021-01-21 DIAGNOSIS — Z98.890 OTHER SPECIFIED POSTPROCEDURAL STATES: Chronic | ICD-10-CM

## 2021-01-21 DIAGNOSIS — Z96.643 PRESENCE OF ARTIFICIAL HIP JOINT, BILATERAL: Chronic | ICD-10-CM

## 2021-01-21 PROCEDURE — 20605 DRAIN/INJ JOINT/BURSA W/O US: CPT | Mod: RT

## 2021-02-23 ENCOUNTER — OUTPATIENT (OUTPATIENT)
Dept: OUTPATIENT SERVICES | Facility: HOSPITAL | Age: 61
LOS: 1 days | Discharge: HOME | End: 2021-02-23

## 2021-02-23 ENCOUNTER — APPOINTMENT (OUTPATIENT)
Dept: PODIATRY | Facility: CLINIC | Age: 61
End: 2021-02-23
Payer: MEDICARE

## 2021-02-23 DIAGNOSIS — Z98.890 OTHER SPECIFIED POSTPROCEDURAL STATES: Chronic | ICD-10-CM

## 2021-02-23 DIAGNOSIS — Z96.643 PRESENCE OF ARTIFICIAL HIP JOINT, BILATERAL: Chronic | ICD-10-CM

## 2021-02-23 PROCEDURE — 20605 DRAIN/INJ JOINT/BURSA W/O US: CPT

## 2021-02-26 NOTE — ASSESSMENT
[FreeTextEntry1] : Physical Exam\par Constitutional: alert and in no acute distress. \par \par Vascular: the pedal pulses are present . there was no peripheral edema. \par \par Musculoskeletal: normal gait, no clubbing or cyanosis of the fingernails, no joint swelling seen and muscle strength and tone were normal. \par \par Skin: normal skin color and pigmentation, normal skin turgor and no rash. \par \par Neurological: deep tendon reflexes were 2+ and symmetric, the sensory exam was normal to light touch and pinprick and no focal deficits. \par \par Psychiatric: oriented to person, place, and time, insight and judgment were intact and the affect was normal. \par  \par Procedure\par Physical Exam\par Constitutional: alert, in no acute distress, well nourished, well developed, healthy appearing and normal voice and communication. \par Vascular: the pedal pulses are present . there was no peripheral edema. DP/PT 2/4 b/l; CFT<3 sec x10; pedal hair present; no edema no varicosities. \par Musculoskeletal: no clubbing or cyanosis of the fingernails, no joint swelling seen and muscle strength and tone were normal . limb length discrepancy (right<left); pain on palpation of right midfoot and with midtarsal joint range of motion. \par Skin: normal skin color and pigmentation, normal skin turgor and no rash . Mass noted on the dorsum aspect of the foot. No signs of acute infection. \par Foot Exam: The right foot was examined, The left foot was examined. The right foot was tender and dorsum tarsal metatarsal joint, but normal in appearance, not swollen and not erythematous. The right toes were normal. Capillary refills for the right foot were 2+ in the posterior tibialis and 2+ in the dorsalis pedis. The sensory exam of the right foot showed normal vibratory sensation at the level of the toes and normal position sense at the level of the toes. The left foot was not normal in appearance, tender and dorsal tarsal metatarsal joint, but not swollen and not erythematous. The left toes were normal. Capillary refills for the left foot were 2+ in the posterior tibialis and 2+ in the dorsalis pedis. The sensory exam of the left foot showed normal vibratory sensation at the level of the toes and normal position sense at the level of the toes. \par Monofilament Testing: normal tactile sensation with monofilament testing throughout right foot, normal tactile sensation with monofilament testing throughout left foot. \par Neurological: deep tendon reflexes were 2+ and symmetric, the sensory exam was normal to light touch and pinprick and no focal deficits. \par Psychiatric: oriented to person, place, and time, insight and judgment were intact and the affect was normal. \par  \par \par Patient examined, history and chart reviewed\par discussed complications and benefits of injection in detail patient consented to injection right foot\par injection of right foot with 0.5 cc betamethasone and 1cc of lidocaine 2%, patient demonstrated verbal agreement\par Follow up in 4 weeks. \par

## 2021-03-02 NOTE — ASU PREOP CHECKLIST - VERIFY SURGICAL SITE/SIDE WITH PATIENT
[Initial] : an initial visit [Patient Declined  Services] : - None: Patient declined  services [Abnormal CXR/ Chest CT] : an abnormal CXR/ chest CT [Cough] : cough [Shortness of Breath] : shortness of breath [Pulmonary Nodules] : pulmonary nodules [TextBox_44] : Covid infection [FreeTextEntry2] : Mackenzie [TWNoteComboBox1] : French done

## 2021-03-08 LAB
25(OH)D3 SERPL-MCNC: 42.9 NG/ML
ALBUMIN SERPL ELPH-MCNC: 4.3 G/DL
ALP BLD-CCNC: 99 U/L
ALT SERPL-CCNC: 18 U/L
ANION GAP SERPL CALC-SCNC: 15 MMOL/L
AST SERPL-CCNC: 22 U/L
BASOPHILS # BLD AUTO: 0.04 K/UL
BASOPHILS NFR BLD AUTO: 0.4 %
BILIRUB SERPL-MCNC: 0.3 MG/DL
BUN SERPL-MCNC: 12 MG/DL
CALCIUM SERPL-MCNC: 9.8 MG/DL
CHLORIDE SERPL-SCNC: 106 MMOL/L
CHOLEST SERPL-MCNC: 126 MG/DL
CO2 SERPL-SCNC: 23 MMOL/L
CREAT SERPL-MCNC: 1 MG/DL
EOSINOPHIL # BLD AUTO: 0.24 K/UL
EOSINOPHIL NFR BLD AUTO: 2.4 %
ESTIMATED AVERAGE GLUCOSE: 114 MG/DL
GLUCOSE SERPL-MCNC: 96 MG/DL
HBA1C MFR BLD HPLC: 5.6 %
HCT VFR BLD CALC: 49.9 %
HCV AB SER QL: NONREACTIVE
HCV S/CO RATIO: 0.14 S/CO
HDLC SERPL-MCNC: 51 MG/DL
HGB BLD-MCNC: 16.7 G/DL
HIV1+2 AB SPEC QL IA.RAPID: NONREACTIVE
IMM GRANULOCYTES NFR BLD AUTO: 0.3 %
LDLC SERPL CALC-MCNC: 58 MG/DL
LYMPHOCYTES # BLD AUTO: 3.56 K/UL
LYMPHOCYTES NFR BLD AUTO: 35.2 %
MAN DIFF?: NORMAL
MCHC RBC-ENTMCNC: 33.2 PG
MCHC RBC-ENTMCNC: 33.5 G/DL
MCV RBC AUTO: 99.2 FL
MONOCYTES # BLD AUTO: 0.63 K/UL
MONOCYTES NFR BLD AUTO: 6.2 %
NEUTROPHILS # BLD AUTO: 5.61 K/UL
NEUTROPHILS NFR BLD AUTO: 55.5 %
NONHDLC SERPL-MCNC: 75 MG/DL
PLATELET # BLD AUTO: 186 K/UL
POTASSIUM SERPL-SCNC: 5.3 MMOL/L
PROT SERPL-MCNC: 6.6 G/DL
RBC # BLD: 5.03 M/UL
RBC # FLD: 14 %
SODIUM SERPL-SCNC: 144 MMOL/L
TRIGL SERPL-MCNC: 87 MG/DL
TSH SERPL-ACNC: 3.57 UIU/ML
WBC # FLD AUTO: 10.11 K/UL

## 2021-03-16 ENCOUNTER — OUTPATIENT (OUTPATIENT)
Dept: OUTPATIENT SERVICES | Facility: HOSPITAL | Age: 61
LOS: 1 days | Discharge: HOME | End: 2021-03-16

## 2021-03-16 DIAGNOSIS — Z01.21 ENCOUNTER FOR DENTAL EXAMINATION AND CLEANING WITH ABNORMAL FINDINGS: ICD-10-CM

## 2021-03-16 DIAGNOSIS — Z98.890 OTHER SPECIFIED POSTPROCEDURAL STATES: Chronic | ICD-10-CM

## 2021-03-16 DIAGNOSIS — Z96.643 PRESENCE OF ARTIFICIAL HIP JOINT, BILATERAL: Chronic | ICD-10-CM

## 2021-03-23 ENCOUNTER — APPOINTMENT (OUTPATIENT)
Dept: PODIATRY | Facility: CLINIC | Age: 61
End: 2021-03-23
Payer: MEDICARE

## 2021-03-23 ENCOUNTER — OUTPATIENT (OUTPATIENT)
Dept: OUTPATIENT SERVICES | Facility: HOSPITAL | Age: 61
LOS: 1 days | Discharge: HOME | End: 2021-03-23

## 2021-03-23 DIAGNOSIS — Z96.643 PRESENCE OF ARTIFICIAL HIP JOINT, BILATERAL: Chronic | ICD-10-CM

## 2021-03-23 DIAGNOSIS — Z98.890 OTHER SPECIFIED POSTPROCEDURAL STATES: Chronic | ICD-10-CM

## 2021-03-23 PROCEDURE — 20605 DRAIN/INJ JOINT/BURSA W/O US: CPT

## 2021-03-31 ENCOUNTER — OUTPATIENT (OUTPATIENT)
Dept: OUTPATIENT SERVICES | Facility: HOSPITAL | Age: 61
LOS: 1 days | Discharge: HOME | End: 2021-03-31

## 2021-03-31 DIAGNOSIS — Z98.890 OTHER SPECIFIED POSTPROCEDURAL STATES: Chronic | ICD-10-CM

## 2021-03-31 DIAGNOSIS — K13.29 OTHER DISTURBANCES OF ORAL EPITHELIUM, INCLUDING TONGUE: ICD-10-CM

## 2021-03-31 DIAGNOSIS — Z96.643 PRESENCE OF ARTIFICIAL HIP JOINT, BILATERAL: Chronic | ICD-10-CM

## 2021-03-31 NOTE — HISTORY OF PRESENT ILLNESS
[FreeTextEntry1] : Follow up on pain of the dorsum of his right foot. Patient states that the pain was relieved for 4 weeks because of the injection given to him at the last visit. Patient is interested in another injection at the same site.pt states that injection is helping at this time and no surgical intervention at this time. Patient denies NVFC and denies SOB at this time. Pt. denies any other pedal complaints at this time.

## 2021-03-31 NOTE — PHYSICAL EXAM
[No Joint Swelling] : no joint swelling [] : no rash [Skin Lesions] : no skin lesions [Oriented To Time, Place, And Person] : oriented to person, place, and time [Impaired Insight] : insight and judgment were intact [FreeTextEntry3] : palpable pedal pulses b/l

## 2021-03-31 NOTE — ASSESSMENT
[FreeTextEntry1] : \par  \par Procedure\par Physical Exam\par Constitutional: alert, in no acute distress, well nourished, well developed, healthy appearing and normal voice and communication. \par Vascular: the pedal pulses are present . there was no peripheral edema. DP/PT 2/4 b/l; CFT<3 sec x10; pedal hair present; no edema no varicosities. \par Musculoskeletal: no clubbing or cyanosis of the fingernails, no joint swelling seen and muscle strength and tone were normal . limb length discrepancy (right<left); pain on palpation of right midfoot and with midtarsal joint range of motion. \par Skin: normal skin color and pigmentation, normal skin turgor and no rash . Mass noted on the dorsum aspect of the foot. No signs of acute infection. \par Foot Exam: The right foot was examined, The left foot was examined. The right foot was tender and dorsum tarsal metatarsal joint, but normal in appearance, not swollen and not erythematous. The right toes were normal. Capillary refills for the right foot were 2+ in the posterior tibialis and 2+ in the dorsalis pedis. The sensory exam of the right foot showed normal vibratory sensation at the level of the toes and normal position sense at the level of the toes. The left foot was not normal in appearance, tender and dorsal tarsal metatarsal joint, but not swollen and not erythematous. The left toes were normal. Capillary refills for the left foot were 2+ in the posterior tibialis and 2+ in the dorsalis pedis. The sensory exam of the left foot showed normal vibratory sensation at the level of the toes and normal position sense at the level of the toes. \par Monofilament Testing: normal tactile sensation with monofilament testing throughout right foot, normal tactile sensation with monofilament testing throughout left foot. \par Neurological: deep tendon reflexes were 2+ and symmetric, the sensory exam was normal to light touch and pinprick and no focal deficits. \par Psychiatric: oriented to person, place, and time, insight and judgment were intact and the affect was normal. \par  \par \par Patient examined, history and chart reviewed\par discussed complications and benefits of injection in detail patient consented to injection right foot\par injection of right foot with 0.5 cc betamethasone and 1cc of lidocaine 2%, patient demonstrated verbal agreement\par Follow up in 4 weeks. \par

## 2021-04-08 ENCOUNTER — APPOINTMENT (OUTPATIENT)
Dept: INTERNAL MEDICINE | Facility: CLINIC | Age: 61
End: 2021-04-08
Payer: MEDICARE

## 2021-04-08 ENCOUNTER — OUTPATIENT (OUTPATIENT)
Dept: OUTPATIENT SERVICES | Facility: HOSPITAL | Age: 61
LOS: 1 days | Discharge: HOME | End: 2021-04-08

## 2021-04-08 VITALS
SYSTOLIC BLOOD PRESSURE: 134 MMHG | DIASTOLIC BLOOD PRESSURE: 85 MMHG | WEIGHT: 200 LBS | HEIGHT: 72 IN | BODY MASS INDEX: 27.09 KG/M2 | TEMPERATURE: 98.1 F | OXYGEN SATURATION: 96 % | HEART RATE: 52 BPM

## 2021-04-08 DIAGNOSIS — Z96.643 PRESENCE OF ARTIFICIAL HIP JOINT, BILATERAL: Chronic | ICD-10-CM

## 2021-04-08 DIAGNOSIS — Z98.890 OTHER SPECIFIED POSTPROCEDURAL STATES: Chronic | ICD-10-CM

## 2021-04-08 DIAGNOSIS — K40.90 UNILATERAL INGUINAL HERNIA, W/OUT OBSTRUCTION OR GANGRENE, NOT SPECIFIED AS RECURRENT: ICD-10-CM

## 2021-04-08 DIAGNOSIS — Z12.11 ENCOUNTER FOR SCREENING FOR MALIGNANT NEOPLASM OF COLON: ICD-10-CM

## 2021-04-08 PROCEDURE — 99213 OFFICE O/P EST LOW 20 MIN: CPT | Mod: GC

## 2021-04-12 DIAGNOSIS — R91.8 OTHER NONSPECIFIC ABNORMAL FINDING OF LUNG FIELD: ICD-10-CM

## 2021-04-12 DIAGNOSIS — E78.5 HYPERLIPIDEMIA, UNSPECIFIED: ICD-10-CM

## 2021-04-12 DIAGNOSIS — R04.0 EPISTAXIS: ICD-10-CM

## 2021-04-12 DIAGNOSIS — K40.90 UNILATERAL INGUINAL HERNIA, WITHOUT OBSTRUCTION OR GANGRENE, NOT SPECIFIED AS RECURRENT: ICD-10-CM

## 2021-04-12 DIAGNOSIS — Z12.11 ENCOUNTER FOR SCREENING FOR MALIGNANT NEOPLASM OF COLON: ICD-10-CM

## 2021-04-12 DIAGNOSIS — I10 ESSENTIAL (PRIMARY) HYPERTENSION: ICD-10-CM

## 2021-04-20 ENCOUNTER — APPOINTMENT (OUTPATIENT)
Dept: PODIATRY | Facility: CLINIC | Age: 61
End: 2021-04-20
Payer: MEDICARE

## 2021-04-20 ENCOUNTER — OUTPATIENT (OUTPATIENT)
Dept: OUTPATIENT SERVICES | Facility: HOSPITAL | Age: 61
LOS: 1 days | Discharge: HOME | End: 2021-04-20

## 2021-04-20 DIAGNOSIS — Z98.890 OTHER SPECIFIED POSTPROCEDURAL STATES: Chronic | ICD-10-CM

## 2021-04-20 DIAGNOSIS — M25.571 PAIN IN RIGHT ANKLE AND JOINTS OF RIGHT FOOT: ICD-10-CM

## 2021-04-20 DIAGNOSIS — Z96.643 PRESENCE OF ARTIFICIAL HIP JOINT, BILATERAL: Chronic | ICD-10-CM

## 2021-04-20 PROCEDURE — 20605 DRAIN/INJ JOINT/BURSA W/O US: CPT

## 2021-04-22 NOTE — REASON FOR VISIT
Bedside shift report received from JARETT Beyer.  Safety check complete.  All patient needs met at this time.  Will continue to monitor.       [Follow-Up Visit] : a follow-up visit for [Foot Pain] : foot pain

## 2021-04-30 NOTE — ASSESSMENT
[FreeTextEntry1] : Procedure\par Physical Exam\par Constitutional: alert, in no acute distress, well nourished, well developed, healthy appearing and normal voice and communication. \par Vascular: the pedal pulses are present . there was no peripheral edema. DP/PT 2/4 b/l; CFT<3 sec x10; pedal hair present; no edema no varicosities. \par Musculoskeletal: no clubbing or cyanosis of the fingernails, no joint swelling seen and muscle strength and tone were normal . limb length discrepancy (right<left); pain on palpation of right midfoot and with midtarsal joint range of motion. \par Skin: normal skin color and pigmentation, normal skin turgor and no rash . Mass noted on the dorsum aspect of the foot. No signs of acute infection. \par Foot Exam: The right foot was examined, The left foot was examined. The right foot was tender and dorsum tarsal metatarsal joint, but normal in appearance, not swollen and not erythematous. The right toes were normal. Capillary refills for the right foot were 2+ in the posterior tibialis and 2+ in the dorsalis pedis. The sensory exam of the right foot showed normal vibratory sensation at the level of the toes and normal position sense at the level of the toes. The left foot was not normal in appearance, tender and dorsal tarsal metatarsal joint, but not swollen and not erythematous. The left toes were normal. Capillary refills for the left foot were 2+ in the posterior tibialis and 2+ in the dorsalis pedis. The sensory exam of the left foot showed normal vibratory sensation at the level of the toes and normal position sense at the level of the toes. \par Monofilament Testing: normal tactile sensation with monofilament testing throughout right foot, normal tactile sensation with monofilament testing throughout left foot. \par Neurological: deep tendon reflexes were 2+ and symmetric, the sensory exam was normal to light touch and pinprick and no focal deficits. \par Psychiatric: oriented to person, place, and time, insight and judgment were intact and the affect was normal. \par \par Assessment; Right Dorsum Arthritic Pain; \par  \par Plan: \par Patient examined, history and chart reviewed\par discussed complications and benefits of injection in detail patient consented to injection right foot\par injection of right foot with 0.5 cc betamethasone and 1cc of lidocaine 2%, patient demonstrated verbal agreement\par Follow up in 4 weeks. \par

## 2021-05-03 NOTE — ASSESSMENT
[FreeTextEntry1] : 61 y/o M with pmh of HTN, DLD, pulmonary nodules, thyroid mass s/p partial hemithyroidectomy, osteoarthritis and chronic low back pain presents for a 6 month follow up.\par \par #L direct inguinal hernia\par - monitor for now. Surgery referral if becomes symptomatic\par \par #Blood tinged nasal mucus\par - dry nasal mucosa\par - Order nasal spray. ENT if acute bleeding\par \par # Thyroid mass s/p hemithyroidectomy, pathology positive for hyperplasia/no tumor seen\par - TSH 3.57\par - c/w levothyroxine\par - no symptoms, ENT following\par \par # L Eustachian tube dysfunction:\par - evaluated by ENT, ear discomfort due ET tube dysfunction \par - stable with flonase\par \par # lung nodules\par - repeat CT lungs q6 months\par - f/u with pulmonologist\par - former smoker, quit in early 2020\par \par # HTN\par - c/w norvasc, aspirin\par \par # DLD\par - c/w statin\par - LDL 58\par \par # skin nodules- Seborrheic keratosis\par - Derm following\par \par # Chronic Low Back Pain/ Osteoarthritis R. Foot\par - c/w meloxicam, robaxin prn\par - follows Podiatry\par \par # Low back pain/ foot OA\par - c/w robaxin prn; c/w gabapentin\par \par #Macrocytosis \par - Monitor for anemia\par \par # HCM\par - Last colonoscopy done in 2010. FIT test done 1 year ago. Would like fit test. Will order it\par - Tdap 2015\par - pneumococcal vaccine at 65\par - One time HIV/HCV screen ordered\par \par Follow up in 6 months after labs

## 2021-05-03 NOTE — PHYSICAL EXAM
[No Acute Distress] : no acute distress [Well Nourished] : well nourished [Well Developed] : well developed [Well-Appearing] : well-appearing [Normal Sclera/Conjunctiva] : normal sclera/conjunctiva [PERRL] : pupils equal round and reactive to light [EOMI] : extraocular movements intact [Normal Outer Ear/Nose] : the outer ears and nose were normal in appearance [Normal Oropharynx] : the oropharynx was normal [No JVD] : no jugular venous distention [No Lymphadenopathy] : no lymphadenopathy [Supple] : supple [Thyroid Normal, No Nodules] : the thyroid was normal and there were no nodules present [No Respiratory Distress] : no respiratory distress  [No Accessory Muscle Use] : no accessory muscle use [Clear to Auscultation] : lungs were clear to auscultation bilaterally [Normal Rate] : normal rate  [Regular Rhythm] : with a regular rhythm [Normal S1, S2] : normal S1 and S2 [No Murmur] : no murmur heard [No Carotid Bruits] : no carotid bruits [No Abdominal Bruit] : a ~M bruit was not heard ~T in the abdomen [No Varicosities] : no varicosities [Pedal Pulses Present] : the pedal pulses are present [No Edema] : there was no peripheral edema [No Palpable Aorta] : no palpable aorta [No Extremity Clubbing/Cyanosis] : no extremity clubbing/cyanosis [Soft] : abdomen soft [Non Tender] : non-tender [Non-distended] : non-distended [No HSM] : no HSM [Normal Bowel Sounds] : normal bowel sounds [Normal Posterior Cervical Nodes] : no posterior cervical lymphadenopathy [Normal Anterior Cervical Nodes] : no anterior cervical lymphadenopathy [No CVA Tenderness] : no CVA  tenderness [No Spinal Tenderness] : no spinal tenderness [No Joint Swelling] : no joint swelling [Grossly Normal Strength/Tone] : grossly normal strength/tone [No Rash] : no rash [Coordination Grossly Intact] : coordination grossly intact [No Focal Deficits] : no focal deficits [Normal Gait] : normal gait [Deep Tendon Reflexes (DTR)] : deep tendon reflexes were 2+ and symmetric [Normal Affect] : the affect was normal [Normal Insight/Judgement] : insight and judgment were intact [de-identified] : L direct inguinal hernia

## 2021-05-03 NOTE — HISTORY OF PRESENT ILLNESS
[FreeTextEntry1] : 6 month follow up [de-identified] : 59 y/o M with pmh of HTN, DLD, pulmonary nodules, thyroid mass s/p partial hemithyroidectomy, osteoarthritis and chronic low back pain presents for medication refill. Has been having blood tinged nasal mucus for past 4 months. Has a new developing hernia in L inguina. Not painful, Increases with cough. Had a R inguinal hernia repair.

## 2021-05-25 ENCOUNTER — OUTPATIENT (OUTPATIENT)
Dept: OUTPATIENT SERVICES | Facility: HOSPITAL | Age: 61
LOS: 1 days | Discharge: HOME | End: 2021-05-25

## 2021-05-25 ENCOUNTER — APPOINTMENT (OUTPATIENT)
Dept: PODIATRY | Facility: CLINIC | Age: 61
End: 2021-05-25
Payer: MEDICARE

## 2021-05-25 DIAGNOSIS — Z96.643 PRESENCE OF ARTIFICIAL HIP JOINT, BILATERAL: Chronic | ICD-10-CM

## 2021-05-25 DIAGNOSIS — Z98.890 OTHER SPECIFIED POSTPROCEDURAL STATES: Chronic | ICD-10-CM

## 2021-05-25 PROCEDURE — 20605 DRAIN/INJ JOINT/BURSA W/O US: CPT | Mod: RT

## 2021-06-02 NOTE — PHYSICAL EXAM
[FreeTextEntry3] : palpable pedal pulses b/l [No Joint Swelling] : no joint swelling [] : no rash [Skin Lesions] : no skin lesions [Oriented To Time, Place, And Person] : oriented to person, place, and time [Impaired Insight] : insight and judgment were intact

## 2021-06-04 DIAGNOSIS — M19.071 PRIMARY OSTEOARTHRITIS, RIGHT ANKLE AND FOOT: ICD-10-CM

## 2021-06-04 DIAGNOSIS — M79.671 PAIN IN RIGHT FOOT: ICD-10-CM

## 2021-06-04 DIAGNOSIS — M25.571 PAIN IN RIGHT ANKLE AND JOINTS OF RIGHT FOOT: ICD-10-CM

## 2021-06-04 DIAGNOSIS — M19.079 PRIMARY OSTEOARTHRITIS, UNSPECIFIED ANKLE AND FOOT: ICD-10-CM

## 2021-06-22 ENCOUNTER — OUTPATIENT (OUTPATIENT)
Dept: OUTPATIENT SERVICES | Facility: HOSPITAL | Age: 61
LOS: 1 days | Discharge: HOME | End: 2021-06-22

## 2021-06-22 ENCOUNTER — APPOINTMENT (OUTPATIENT)
Dept: PODIATRY | Facility: CLINIC | Age: 61
End: 2021-06-22
Payer: MEDICARE

## 2021-06-22 DIAGNOSIS — Z96.643 PRESENCE OF ARTIFICIAL HIP JOINT, BILATERAL: Chronic | ICD-10-CM

## 2021-06-22 DIAGNOSIS — Z98.890 OTHER SPECIFIED POSTPROCEDURAL STATES: Chronic | ICD-10-CM

## 2021-06-22 PROCEDURE — 20605 DRAIN/INJ JOINT/BURSA W/O US: CPT

## 2021-06-30 DIAGNOSIS — M79.671 PAIN IN RIGHT FOOT: ICD-10-CM

## 2021-06-30 DIAGNOSIS — M25.571 PAIN IN RIGHT ANKLE AND JOINTS OF RIGHT FOOT: ICD-10-CM

## 2021-06-30 DIAGNOSIS — M19.071 PRIMARY OSTEOARTHRITIS, RIGHT ANKLE AND FOOT: ICD-10-CM

## 2021-06-30 NOTE — ASSESSMENT
[FreeTextEntry1] : Assessment; Right Dorsum Arthritic Pain; \par  \par Plan: \par Patient examined, history and chart reviewed\par discussed complications and benefits of injection in detail patient consented to injection right foot\par injection of right foot with 0.5 cc betamethasone and 1cc of lidocaine 2%, patient demonstrated verbal agreement\par Follow up in 4 weeks. \par

## 2021-06-30 NOTE — PHYSICAL EXAM
[2+] : right foot dorsalis pedis 2+ [No Joint Swelling] : no joint swelling [] : no rash [Skin Lesions] : no skin lesions [Oriented To Time, Place, And Person] : oriented to person, place, and time [Impaired Insight] : insight and judgment were intact [FreeTextEntry3] : palpable pedal pulses b/l [de-identified] : Chronic midfoot pain to dorsal aspect Right foot

## 2021-06-30 NOTE — HISTORY OF PRESENT ILLNESS
[FreeTextEntry1] : 61 yo male presents for steroid injection to Right Foot. Patient states that the pain was relieved for 4 weeks because of the injection given to him at the last visit. Patient is interested in another injection at the same site. pt states that injection is helping at this time and no surgical intervention at this time. Patient denies NVFC and denies SOB at this time. Denies any other pedal complaints at this time.

## 2021-07-02 ENCOUNTER — NON-APPOINTMENT (OUTPATIENT)
Age: 61
End: 2021-07-02

## 2021-07-02 ENCOUNTER — APPOINTMENT (OUTPATIENT)
Dept: GASTROENTEROLOGY | Facility: CLINIC | Age: 61
End: 2021-07-02
Payer: MEDICARE

## 2021-07-02 ENCOUNTER — OUTPATIENT (OUTPATIENT)
Dept: OUTPATIENT SERVICES | Facility: HOSPITAL | Age: 61
LOS: 1 days | Discharge: HOME | End: 2021-07-02

## 2021-07-02 VITALS
SYSTOLIC BLOOD PRESSURE: 129 MMHG | HEIGHT: 72 IN | HEART RATE: 87 BPM | TEMPERATURE: 96.2 F | BODY MASS INDEX: 25.73 KG/M2 | WEIGHT: 190 LBS | DIASTOLIC BLOOD PRESSURE: 84 MMHG

## 2021-07-02 DIAGNOSIS — Z98.890 OTHER SPECIFIED POSTPROCEDURAL STATES: Chronic | ICD-10-CM

## 2021-07-02 DIAGNOSIS — Z96.643 PRESENCE OF ARTIFICIAL HIP JOINT, BILATERAL: Chronic | ICD-10-CM

## 2021-07-02 DIAGNOSIS — K52.9 NONINFECTIVE GASTROENTERITIS AND COLITIS, UNSPECIFIED: ICD-10-CM

## 2021-07-02 PROCEDURE — 99204 OFFICE O/P NEW MOD 45 MIN: CPT | Mod: GC

## 2021-07-02 NOTE — PHYSICAL EXAM
[General Appearance - Alert] : alert [General Appearance - In No Acute Distress] : in no acute distress [PERRL With Normal Accommodation] : pupils were equal in size, round, and reactive to light [Neck Appearance] : the appearance of the neck was normal [Respiration, Rhythm And Depth] : normal respiratory rhythm and effort [Auscultation Breath Sounds / Voice Sounds] : lungs were clear to auscultation bilaterally [Heart Sounds] : normal S1 and S2 [Bowel Sounds] : normal bowel sounds [Abdomen Soft] : soft [Abdomen Tenderness] : non-tender [No CVA Tenderness] : no ~M costovertebral angle tenderness [Abnormal Walk] : normal gait [Oriented To Time, Place, And Person] : oriented to person, place, and time

## 2021-07-02 NOTE — HISTORY OF PRESENT ILLNESS
[de-identified] : a 61 yo man with hx of HTN, hypothyroidsim, DLD, bilateral hip repalcement \par is here for diarrhea of 3-4 months, waterry, goes once a day, \par tried to avoid dairy product, mild improvement\par tried imodium with good improvement \par no recent travel, no sick contacts, no fever no weight loss\par colonoscopy 10 years ago\par cologuard last year negative\par no EGD\par \par

## 2021-07-02 NOTE — REVIEW OF SYSTEMS
[Diarrhea] : diarrhea [Arthralgias] : arthralgias [Fever] : no fever [Chills] : no chills [Eye Pain] : no eye pain [Red Eyes] : eyes not red [Earache] : no earache [Loss Of Hearing] : no hearing loss [Cough] : no cough [SOB on Exertion] : no shortness of breath during exertion [Vomiting] : no vomiting [Constipation] : no constipation [Dysuria] : no dysuria

## 2021-07-06 ENCOUNTER — LABORATORY RESULT (OUTPATIENT)
Age: 61
End: 2021-07-06

## 2021-07-07 LAB
C DIFF TOX GENS STL QL NAA+PROBE: NORMAL
CDIFF BY PCR: NOT DETECTED
CRP SERPL-MCNC: 8 MG/L
DEPRECATED KAPPA LC FREE/LAMBDA SER: 1.41 RATIO
ENDOMYSIUM IGA SER QL: NEGATIVE
ENDOMYSIUM IGA TITR SER: NORMAL
ERYTHROCYTE [SEDIMENTATION RATE] IN BLOOD BY WESTERGREN METHOD: 16 MM/HR
GI PCR PANEL, STOOL: NORMAL
IGA SER QL IEP: 343 MG/DL
IGG SER QL IEP: 790 MG/DL
IGM SER QL IEP: 168 MG/DL
KAPPA LC CSF-MCNC: 1.55 MG/DL
KAPPA LC SERPL-MCNC: 2.18 MG/DL
WRIGHT STN STL: NEGATIVE

## 2021-07-08 LAB
G LAMBLIA AG STL QL: NORMAL
TTG IGA SER IA-ACNC: 1.3 U/ML
TTG IGA SER-ACNC: NEGATIVE
TTG IGG SER IA-ACNC: 3.9 U/ML
TTG IGG SER IA-ACNC: NEGATIVE

## 2021-07-09 RX ORDER — SOD SULF/SODIUM/NAHCO3/KCL/PEG
1 SOLUTION, RECONSTITUTED, ORAL ORAL
Qty: 1 | Refills: 0
Start: 2021-07-09 | End: 2021-07-09

## 2021-07-10 LAB — BACTERIA STL CULT: NORMAL

## 2021-07-12 ENCOUNTER — NON-APPOINTMENT (OUTPATIENT)
Age: 61
End: 2021-07-12

## 2021-07-12 DIAGNOSIS — R19.5 OTHER FECAL ABNORMALITIES: ICD-10-CM

## 2021-07-13 LAB — CALPROTECTIN FECAL: <16 UG/G

## 2021-07-15 LAB — LACTOFERRIN STL-MCNC: <1

## 2021-07-20 ENCOUNTER — OUTPATIENT (OUTPATIENT)
Dept: OUTPATIENT SERVICES | Facility: HOSPITAL | Age: 61
LOS: 1 days | Discharge: HOME | End: 2021-07-20

## 2021-07-20 ENCOUNTER — APPOINTMENT (OUTPATIENT)
Dept: PODIATRY | Facility: CLINIC | Age: 61
End: 2021-07-20
Payer: MEDICARE

## 2021-07-20 DIAGNOSIS — Z98.890 OTHER SPECIFIED POSTPROCEDURAL STATES: Chronic | ICD-10-CM

## 2021-07-20 DIAGNOSIS — Z96.643 PRESENCE OF ARTIFICIAL HIP JOINT, BILATERAL: Chronic | ICD-10-CM

## 2021-07-20 PROCEDURE — 20605 DRAIN/INJ JOINT/BURSA W/O US: CPT

## 2021-07-30 NOTE — ASSESSMENT
[FreeTextEntry1] : Assessment; Right Dorsum Arthritic Pain; \par  \par Plan: \par Patient examined, history and chart reviewed\par discussed complications and benefits of injection in detail patient consented to injection right foot\par injection of right foot with 0.5 cc dexamethasone and 1cc of lidocaine 2%, patient demonstrated verbal agreement\par Follow up in 4 weeks. \par

## 2021-07-30 NOTE — PHYSICAL EXAM
[2+] : right foot dorsalis pedis 2+ [No Joint Swelling] : no joint swelling [] : no rash [Skin Lesions] : no skin lesions [Oriented To Time, Place, And Person] : oriented to person, place, and time [Impaired Insight] : insight and judgment were intact [FreeTextEntry3] : palpable pedal pulses b/l [de-identified] : Chronic midfoot pain to dorsal aspect Right foot

## 2021-07-30 NOTE — HISTORY OF PRESENT ILLNESS
[FreeTextEntry1] : 59 yo male presents for steroid injection to Right Foot. Patient states that the pain was relieved for 4 weeks because of the injection given to him at the last visit. Patient is interested in another injection at the same site. pt states that injection is helping at this time and no surgical intervention at this time. Patient denies NVFC and denies SOB at this time. Denies any other pedal complaints at this time.

## 2021-08-17 ENCOUNTER — OUTPATIENT (OUTPATIENT)
Dept: OUTPATIENT SERVICES | Facility: HOSPITAL | Age: 61
LOS: 1 days | Discharge: HOME | End: 2021-08-17

## 2021-08-17 ENCOUNTER — APPOINTMENT (OUTPATIENT)
Dept: PODIATRY | Facility: CLINIC | Age: 61
End: 2021-08-17
Payer: MEDICARE

## 2021-08-17 DIAGNOSIS — Z96.643 PRESENCE OF ARTIFICIAL HIP JOINT, BILATERAL: Chronic | ICD-10-CM

## 2021-08-17 DIAGNOSIS — Z98.890 OTHER SPECIFIED POSTPROCEDURAL STATES: Chronic | ICD-10-CM

## 2021-08-17 PROCEDURE — 20605 DRAIN/INJ JOINT/BURSA W/O US: CPT

## 2021-08-17 NOTE — PHYSICAL EXAM
[2+] : right foot dorsalis pedis 2+ [FreeTextEntry3] : palpable pedal pulses b/l [No Joint Swelling] : no joint swelling [Skin Lesions] : no skin lesions [Oriented To Time, Place, And Person] : oriented to person, place, and time [Impaired Insight] : insight and judgment were intact [General Appearance - Alert] : alert [General Appearance - In No Acute Distress] : in no acute distress [General Appearance - Well Nourished] : well nourished [General Appearance - Well Developed] : well developed [] : normal voice and communication [General Appearance - Well-Appearing] : healthy appearing [de-identified] : Chronic midfoot pain dorsal aspect of right omar; mild edema [FreeTextEntry1] : No rash and no skin lesion [Sensation] : the sensory exam was normal to light touch and pinprick [No Focal Deficits] : no focal deficits

## 2021-08-17 NOTE — ASSESSMENT
[FreeTextEntry1] : Assessment: arthritis of foot\par Plan\par Pt seen and eval\par 0.5cc dexa and 1% lido 2% injected on dorsum midfoot right\par f/u in 4 weeks

## 2021-08-17 NOTE — PHYSICAL EXAM
[2+] : right foot dorsalis pedis 2+ [FreeTextEntry3] : palpable pedal pulses b/l [No Joint Swelling] : no joint swelling [Skin Lesions] : no skin lesions [Oriented To Time, Place, And Person] : oriented to person, place, and time [Impaired Insight] : insight and judgment were intact [General Appearance - Alert] : alert [General Appearance - In No Acute Distress] : in no acute distress [General Appearance - Well Nourished] : well nourished [General Appearance - Well Developed] : well developed [] : normal voice and communication [General Appearance - Well-Appearing] : healthy appearing [de-identified] : Chronic midfoot pain dorsal aspect of right omar; mild edema [FreeTextEntry1] : No rash and no skin lesion [Sensation] : the sensory exam was normal to light touch and pinprick [No Focal Deficits] : no focal deficits

## 2021-08-17 NOTE — REASON FOR VISIT
[Foot Pain] : foot pain [Follow-Up Visit] : a follow-up visit for [FreeTextEntry2] : Right dorsum midfoot pain

## 2021-08-22 ENCOUNTER — OUTPATIENT (OUTPATIENT)
Dept: OUTPATIENT SERVICES | Facility: HOSPITAL | Age: 61
LOS: 1 days | Discharge: HOME | End: 2021-08-22

## 2021-08-22 ENCOUNTER — LABORATORY RESULT (OUTPATIENT)
Age: 61
End: 2021-08-22

## 2021-08-22 DIAGNOSIS — Z96.643 PRESENCE OF ARTIFICIAL HIP JOINT, BILATERAL: Chronic | ICD-10-CM

## 2021-08-22 DIAGNOSIS — Z98.890 OTHER SPECIFIED POSTPROCEDURAL STATES: Chronic | ICD-10-CM

## 2021-08-22 DIAGNOSIS — Z11.59 ENCOUNTER FOR SCREENING FOR OTHER VIRAL DISEASES: ICD-10-CM

## 2021-08-25 ENCOUNTER — RESULT REVIEW (OUTPATIENT)
Age: 61
End: 2021-08-25

## 2021-08-25 ENCOUNTER — TRANSCRIPTION ENCOUNTER (OUTPATIENT)
Age: 61
End: 2021-08-25

## 2021-08-25 ENCOUNTER — OUTPATIENT (OUTPATIENT)
Dept: OUTPATIENT SERVICES | Facility: HOSPITAL | Age: 61
LOS: 1 days | Discharge: HOME | End: 2021-08-25
Payer: MEDICARE

## 2021-08-25 VITALS
HEART RATE: 63 BPM | DIASTOLIC BLOOD PRESSURE: 78 MMHG | SYSTOLIC BLOOD PRESSURE: 151 MMHG | OXYGEN SATURATION: 99 % | RESPIRATION RATE: 18 BRPM

## 2021-08-25 VITALS
TEMPERATURE: 98 F | HEART RATE: 54 BPM | WEIGHT: 190.04 LBS | HEIGHT: 71 IN | DIASTOLIC BLOOD PRESSURE: 93 MMHG | RESPIRATION RATE: 18 BRPM | SYSTOLIC BLOOD PRESSURE: 156 MMHG

## 2021-08-25 DIAGNOSIS — Z96.643 PRESENCE OF ARTIFICIAL HIP JOINT, BILATERAL: Chronic | ICD-10-CM

## 2021-08-25 DIAGNOSIS — Z98.890 OTHER SPECIFIED POSTPROCEDURAL STATES: Chronic | ICD-10-CM

## 2021-08-25 PROCEDURE — 88312 SPECIAL STAINS GROUP 1: CPT | Mod: 26

## 2021-08-25 PROCEDURE — 88305 TISSUE EXAM BY PATHOLOGIST: CPT | Mod: 26

## 2021-08-25 NOTE — PRE-ANESTHESIA EVALUATION ADULT - NSANTHPMHFT_GEN_ALL_CORE
62 y/o M with pmh of HTN, HLD, hypothyroidism, COPD? (denies inhaler use) who presents for EGD + colonoscopy.     > 4 METS

## 2021-08-25 NOTE — CHART NOTE - NSCHARTNOTEFT_GEN_A_CORE
PACU ANESTHESIA ADMISSION NOTE      Procedure:   Post op diagnosis:      ____  Intubated  TV:______       Rate: ______      FiO2: ______    __x__  Patent Airway    __x__  Full return of protective reflexes    __x__  Full recovery from anesthesia / back to baseline     Vitals:   T:  97.9         R:  16                BP: 118/84                Sat:  98%                 P: 73      Mental Status:  __x__ Awake   __x___ Alert   _____ Drowsy   _____ Sedated    Nausea/Vomiting:  __x__ NO  ______Yes,   See Post - Op Orders          Pain Scale (0-10):  __0___    Treatment: ____ None    ___x_ See Post - Op/PCA Orders    Post - Operative Fluids:   ____ Oral   __x__ See Post - Op Orders    Plan: Discharge:   __x__Home       _____Floor     _____Critical Care    _____  Other:_________________    Comments:  pt tolerated procedure well, pt transferred to PACU and report was given to PACU RN, vital signs are stable and pt shows no signs of distress. no anesthesia complications, discharge pt when criteria met.

## 2021-08-25 NOTE — ASU PATIENT PROFILE, ADULT - NSICDXPASTMEDICALHX_GEN_ALL_CORE_FT
PAST MEDICAL HISTORY:  Anxiety     Back pain     Emphysema/COPD "mild" per pt    Gallstones     Goiter     HLD (hyperlipidemia)     HTN (hypertension)     OA (osteoarthritis)

## 2021-08-26 LAB
SURGICAL PATHOLOGY STUDY: SIGNIFICANT CHANGE UP
SURGICAL PATHOLOGY STUDY: SIGNIFICANT CHANGE UP

## 2021-08-30 DIAGNOSIS — D12.3 BENIGN NEOPLASM OF TRANSVERSE COLON: ICD-10-CM

## 2021-08-30 DIAGNOSIS — D12.0 BENIGN NEOPLASM OF CECUM: ICD-10-CM

## 2021-08-30 DIAGNOSIS — J43.9 EMPHYSEMA, UNSPECIFIED: ICD-10-CM

## 2021-08-30 DIAGNOSIS — E78.5 HYPERLIPIDEMIA, UNSPECIFIED: ICD-10-CM

## 2021-08-30 DIAGNOSIS — D12.2 BENIGN NEOPLASM OF ASCENDING COLON: ICD-10-CM

## 2021-08-30 DIAGNOSIS — E03.9 HYPOTHYROIDISM, UNSPECIFIED: ICD-10-CM

## 2021-08-30 DIAGNOSIS — I10 ESSENTIAL (PRIMARY) HYPERTENSION: ICD-10-CM

## 2021-08-30 DIAGNOSIS — K64.8 OTHER HEMORRHOIDS: ICD-10-CM

## 2021-08-30 DIAGNOSIS — K52.9 NONINFECTIVE GASTROENTERITIS AND COLITIS, UNSPECIFIED: ICD-10-CM

## 2021-08-30 DIAGNOSIS — R19.7 DIARRHEA, UNSPECIFIED: ICD-10-CM

## 2021-08-30 DIAGNOSIS — Z88.0 ALLERGY STATUS TO PENICILLIN: ICD-10-CM

## 2021-08-30 DIAGNOSIS — D12.5 BENIGN NEOPLASM OF SIGMOID COLON: ICD-10-CM

## 2021-08-30 DIAGNOSIS — Z96.643 PRESENCE OF ARTIFICIAL HIP JOINT, BILATERAL: ICD-10-CM

## 2021-08-30 DIAGNOSIS — K62.1 RECTAL POLYP: ICD-10-CM

## 2021-08-30 DIAGNOSIS — M19.90 UNSPECIFIED OSTEOARTHRITIS, UNSPECIFIED SITE: ICD-10-CM

## 2021-09-09 ENCOUNTER — RX RENEWAL (OUTPATIENT)
Age: 61
End: 2021-09-09

## 2021-09-14 ENCOUNTER — RESULT REVIEW (OUTPATIENT)
Age: 61
End: 2021-09-14

## 2021-09-14 ENCOUNTER — OUTPATIENT (OUTPATIENT)
Dept: OUTPATIENT SERVICES | Facility: HOSPITAL | Age: 61
LOS: 1 days | Discharge: HOME | End: 2021-09-14
Payer: MEDICARE

## 2021-09-14 DIAGNOSIS — Z98.890 OTHER SPECIFIED POSTPROCEDURAL STATES: Chronic | ICD-10-CM

## 2021-09-14 DIAGNOSIS — Z96.643 PRESENCE OF ARTIFICIAL HIP JOINT, BILATERAL: Chronic | ICD-10-CM

## 2021-09-14 DIAGNOSIS — R91.8 OTHER NONSPECIFIC ABNORMAL FINDING OF LUNG FIELD: ICD-10-CM

## 2021-09-14 PROCEDURE — 71250 CT THORAX DX C-: CPT | Mod: 26,MH

## 2021-09-28 ENCOUNTER — OUTPATIENT (OUTPATIENT)
Dept: OUTPATIENT SERVICES | Facility: HOSPITAL | Age: 61
LOS: 1 days | Discharge: HOME | End: 2021-09-28

## 2021-09-28 ENCOUNTER — APPOINTMENT (OUTPATIENT)
Dept: PODIATRY | Facility: CLINIC | Age: 61
End: 2021-09-28
Payer: MEDICARE

## 2021-09-28 DIAGNOSIS — Z96.643 PRESENCE OF ARTIFICIAL HIP JOINT, BILATERAL: Chronic | ICD-10-CM

## 2021-09-28 DIAGNOSIS — Z98.890 OTHER SPECIFIED POSTPROCEDURAL STATES: Chronic | ICD-10-CM

## 2021-09-28 PROCEDURE — 20605 DRAIN/INJ JOINT/BURSA W/O US: CPT

## 2021-09-28 NOTE — PHYSICAL EXAM
[General Appearance - Alert] : alert [General Appearance - In No Acute Distress] : in no acute distress [General Appearance - Well Nourished] : well nourished [General Appearance - Well Developed] : well developed [General Appearance - Well-Appearing] : healthy appearing [] : normal voice and communication [de-identified] : Chronic midfoot pain dorsal aspect of right omar; mild edema [FreeTextEntry1] : No rash and no skin lesion [Sensation] : the sensory exam was normal to light touch and pinprick [No Focal Deficits] : no focal deficits

## 2021-10-14 ENCOUNTER — OUTPATIENT (OUTPATIENT)
Dept: OUTPATIENT SERVICES | Facility: HOSPITAL | Age: 61
LOS: 1 days | Discharge: HOME | End: 2021-10-14

## 2021-10-14 ENCOUNTER — APPOINTMENT (OUTPATIENT)
Dept: INTERNAL MEDICINE | Facility: CLINIC | Age: 61
End: 2021-10-14
Payer: MEDICARE

## 2021-10-14 ENCOUNTER — NON-APPOINTMENT (OUTPATIENT)
Age: 61
End: 2021-10-14

## 2021-10-14 VITALS
DIASTOLIC BLOOD PRESSURE: 88 MMHG | BODY MASS INDEX: 27.22 KG/M2 | SYSTOLIC BLOOD PRESSURE: 150 MMHG | HEART RATE: 53 BPM | OXYGEN SATURATION: 96 % | TEMPERATURE: 96.8 F | WEIGHT: 201 LBS | HEIGHT: 72 IN | RESPIRATION RATE: 14 BRPM

## 2021-10-14 DIAGNOSIS — Z98.890 OTHER SPECIFIED POSTPROCEDURAL STATES: Chronic | ICD-10-CM

## 2021-10-14 DIAGNOSIS — Z00.00 ENCOUNTER FOR GENERAL ADULT MEDICAL EXAMINATION W/OUT ABNORMAL FINDINGS: ICD-10-CM

## 2021-10-14 DIAGNOSIS — Z96.643 PRESENCE OF ARTIFICIAL HIP JOINT, BILATERAL: Chronic | ICD-10-CM

## 2021-10-14 LAB
ALBUMIN SERPL ELPH-MCNC: 4.5 G/DL
ALP BLD-CCNC: 96 U/L
ALT SERPL-CCNC: 20 U/L
ANION GAP SERPL CALC-SCNC: 12 MMOL/L
AST SERPL-CCNC: 21 U/L
BASOPHILS # BLD AUTO: 0.06 K/UL
BASOPHILS NFR BLD AUTO: 0.7 %
BILIRUB SERPL-MCNC: 0.4 MG/DL
BUN SERPL-MCNC: 12 MG/DL
CALCIUM SERPL-MCNC: 9.7 MG/DL
CHLORIDE SERPL-SCNC: 104 MMOL/L
CO2 SERPL-SCNC: 27 MMOL/L
CREAT SERPL-MCNC: 0.9 MG/DL
EOSINOPHIL # BLD AUTO: 0.22 K/UL
EOSINOPHIL NFR BLD AUTO: 2.5 %
ESTIMATED AVERAGE GLUCOSE: 114 MG/DL
FOLATE SERPL-MCNC: 10.6 NG/ML
GLUCOSE SERPL-MCNC: 95 MG/DL
HBA1C MFR BLD HPLC: 5.6 %
HCT VFR BLD CALC: 51 %
HGB BLD-MCNC: 17.1 G/DL
IMM GRANULOCYTES NFR BLD AUTO: 0.3 %
LYMPHOCYTES # BLD AUTO: 3.41 K/UL
LYMPHOCYTES NFR BLD AUTO: 38.4 %
MAN DIFF?: NORMAL
MCHC RBC-ENTMCNC: 33.2 PG
MCHC RBC-ENTMCNC: 33.5 G/DL
MCV RBC AUTO: 99 FL
MONOCYTES # BLD AUTO: 0.65 K/UL
MONOCYTES NFR BLD AUTO: 7.3 %
NEUTROPHILS # BLD AUTO: 4.52 K/UL
NEUTROPHILS NFR BLD AUTO: 50.8 %
PLATELET # BLD AUTO: 181 K/UL
POTASSIUM SERPL-SCNC: 5.1 MMOL/L
PROT SERPL-MCNC: 6.9 G/DL
RBC # BLD: 5.15 M/UL
RBC # FLD: 13.6 %
SODIUM SERPL-SCNC: 143 MMOL/L
WBC # FLD AUTO: 8.89 K/UL

## 2021-10-14 PROCEDURE — 99214 OFFICE O/P EST MOD 30 MIN: CPT | Mod: GC

## 2021-10-15 LAB
CHOLEST SERPL-MCNC: 137 MG/DL
FERRITIN SERPL-MCNC: 61 NG/ML
HDLC SERPL-MCNC: 53 MG/DL
IRON SATN MFR SERPL: 29 %
IRON SERPL-MCNC: 107 UG/DL
LDLC SERPL CALC-MCNC: 66 MG/DL
NONHDLC SERPL-MCNC: 84 MG/DL
TIBC SERPL-MCNC: 365 UG/DL
TRIGL SERPL-MCNC: 97 MG/DL
UIBC SERPL-MCNC: 258 UG/DL
VIT B12 SERPL-MCNC: 678 PG/ML

## 2021-10-19 ENCOUNTER — OUTPATIENT (OUTPATIENT)
Dept: OUTPATIENT SERVICES | Facility: HOSPITAL | Age: 61
LOS: 1 days | Discharge: HOME | End: 2021-10-19

## 2021-10-19 DIAGNOSIS — Z98.890 OTHER SPECIFIED POSTPROCEDURAL STATES: Chronic | ICD-10-CM

## 2021-10-19 DIAGNOSIS — Z96.643 PRESENCE OF ARTIFICIAL HIP JOINT, BILATERAL: Chronic | ICD-10-CM

## 2021-10-19 DIAGNOSIS — Z00.00 ENCOUNTER FOR GENERAL ADULT MEDICAL EXAMINATION WITHOUT ABNORMAL FINDINGS: ICD-10-CM

## 2021-10-29 ENCOUNTER — OUTPATIENT (OUTPATIENT)
Dept: OUTPATIENT SERVICES | Facility: HOSPITAL | Age: 61
LOS: 1 days | Discharge: HOME | End: 2021-10-29

## 2021-10-29 ENCOUNTER — NON-APPOINTMENT (OUTPATIENT)
Age: 61
End: 2021-10-29

## 2021-10-29 ENCOUNTER — APPOINTMENT (OUTPATIENT)
Dept: GASTROENTEROLOGY | Facility: CLINIC | Age: 61
End: 2021-10-29
Payer: MEDICARE

## 2021-10-29 VITALS
BODY MASS INDEX: 27.63 KG/M2 | HEART RATE: 65 BPM | SYSTOLIC BLOOD PRESSURE: 150 MMHG | HEIGHT: 72 IN | DIASTOLIC BLOOD PRESSURE: 60 MMHG | WEIGHT: 204 LBS | TEMPERATURE: 97 F | OXYGEN SATURATION: 98 %

## 2021-10-29 DIAGNOSIS — Z98.890 OTHER SPECIFIED POSTPROCEDURAL STATES: Chronic | ICD-10-CM

## 2021-10-29 DIAGNOSIS — Z96.643 PRESENCE OF ARTIFICIAL HIP JOINT, BILATERAL: Chronic | ICD-10-CM

## 2021-10-29 PROCEDURE — 99213 OFFICE O/P EST LOW 20 MIN: CPT | Mod: GC

## 2021-10-29 NOTE — HISTORY OF PRESENT ILLNESS
[de-identified] : 59 y/o M w/ pmHx of HTN, Hypothyroidism, DLD, b/l hip replacement presetns for follow up of his chronic watery diarrhea. Pt had extensive workup including, stool studies for infectious, IBD and malabsorption which were negative. Underwent EGd and CF in 8/21 without an obvious etiology for his diarrhea. Pt states that he began taking lactaid pills before his meals and that has mostly resolved his diarrhea and is having mostly formed and occasionalyl semi solid stools. He is content with his bowel regimen thus far\par \par EGD 8/21: irregular z line suspicious for barrettes but bx showed eosinphilis only consistent w/ reflux esophagitis and no intestinal metaplsia or dysplasia. duodenitns and no h.pylori\par \par CF 8/21: inadquate prep - random bx non revealing for microscopic colitis. 6 polyps removed w/ largest TA was 12mm in AC. Pt to have repeat colonoscopy d/t inaequate prep

## 2021-10-29 NOTE — ASSESSMENT
[FreeTextEntry1] : 61 y/o M w/ pmHx of HTN, Hypothyroidism, DLD, b/l hip replacement presetns for follow up of his chronic watery diarrhea. Pt had extensive workup including, stool studies for infectious, IBD and malabsorption which were negative. Underwent EGd and CF in 8/21 without an obvious etiology for his diarrhea. Pt states that he began taking lactaid pills before his meals and that has mostly resolved his diarrhea and is having mostly formed and occasionalyl semi solid stools. He is content with his bowel regimen thus far\par \par #Chronic watery diarrhea resolved likely d/t lactose intolerance\par #Reflux esophagitis\par - stool infectious, ibd and malabsorptive workup unremarkable\par - EGD 8/21: irregular z line suspicious for barrettes but bx showed eosinphilis only consistent w/ reflux esophagitis and no intestinal metaplsia or dysplasia. duodenitns and no h.pylori\par - CF 8/21: inadquate prep - random bx non revealing for microscopic colitis. 6 polyps removed w/ largest TA was 12mm in AC. Pt to have repeat colonoscopy d/t inaequate prep\par \par Rec\par - protonix 40 qdaily for duodenitis and GERD\par - c/w lactaid pills\par - will schedule for repeat coloonoscopy

## 2021-11-02 ENCOUNTER — APPOINTMENT (OUTPATIENT)
Dept: PODIATRY | Facility: CLINIC | Age: 61
End: 2021-11-02
Payer: MEDICARE

## 2021-11-02 ENCOUNTER — OUTPATIENT (OUTPATIENT)
Dept: OUTPATIENT SERVICES | Facility: HOSPITAL | Age: 61
LOS: 1 days | Discharge: HOME | End: 2021-11-02

## 2021-11-02 DIAGNOSIS — Z98.890 OTHER SPECIFIED POSTPROCEDURAL STATES: Chronic | ICD-10-CM

## 2021-11-02 DIAGNOSIS — Z96.643 PRESENCE OF ARTIFICIAL HIP JOINT, BILATERAL: Chronic | ICD-10-CM

## 2021-11-02 PROCEDURE — 20605 DRAIN/INJ JOINT/BURSA W/O US: CPT

## 2021-11-05 ENCOUNTER — NON-APPOINTMENT (OUTPATIENT)
Age: 61
End: 2021-11-05

## 2021-11-09 NOTE — PHYSICAL EXAM
[General Appearance - Alert] : alert [General Appearance - In No Acute Distress] : in no acute distress [General Appearance - Well Nourished] : well nourished [General Appearance - Well Developed] : well developed [General Appearance - Well-Appearing] : healthy appearing [] : normal voice and communication [Sensation] : the sensory exam was normal to light touch and pinprick [No Focal Deficits] : no focal deficits [de-identified] : Chronic midfoot pain dorsal aspect of right omar; mild edema [FreeTextEntry1] : No rash and no skin lesion

## 2021-11-09 NOTE — ASSESSMENT
[FreeTextEntry1] : Assessment: arthritis of foot\par Plan\par Pt seen and eval\par 0.5cc dexa and 1cc of  lido 2% injected on dorsum midfoot right\par f/u in 4 weeks

## 2021-11-09 NOTE — HISTORY OF PRESENT ILLNESS
[FreeTextEntry1] : 62 yo male presents for steroid injection to Right Foot. Patient states that the pain was relieved for 4 weeks because of the injection given to him at the last visit. Patient is interested in another injection at the same site. Patient denies NVFC and denies SOB at this time. Denies any other pedal complaints at this time.  [id number]

## 2021-11-14 NOTE — PHYSICAL EXAM
[No Acute Distress] : no acute distress [Well Nourished] : well nourished [Well Developed] : well developed [Well-Appearing] : well-appearing [Normal Sclera/Conjunctiva] : normal sclera/conjunctiva [PERRL] : pupils equal round and reactive to light [EOMI] : extraocular movements intact [Normal Outer Ear/Nose] : the outer ears and nose were normal in appearance [Normal Oropharynx] : the oropharynx was normal [No JVD] : no jugular venous distention [No Lymphadenopathy] : no lymphadenopathy [Supple] : supple [Thyroid Normal, No Nodules] : the thyroid was normal and there were no nodules present [No Respiratory Distress] : no respiratory distress  [No Accessory Muscle Use] : no accessory muscle use [Clear to Auscultation] : lungs were clear to auscultation bilaterally [Normal Rate] : normal rate  [Regular Rhythm] : with a regular rhythm [Normal S1, S2] : normal S1 and S2 [No Murmur] : no murmur heard [No Carotid Bruits] : no carotid bruits [No Abdominal Bruit] : a ~M bruit was not heard ~T in the abdomen [No Varicosities] : no varicosities [Pedal Pulses Present] : the pedal pulses are present [No Edema] : there was no peripheral edema [No Palpable Aorta] : no palpable aorta [No Extremity Clubbing/Cyanosis] : no extremity clubbing/cyanosis [Soft] : abdomen soft [Non Tender] : non-tender [Non-distended] : non-distended [No HSM] : no HSM [Normal Bowel Sounds] : normal bowel sounds [Normal Posterior Cervical Nodes] : no posterior cervical lymphadenopathy [Normal Anterior Cervical Nodes] : no anterior cervical lymphadenopathy [No CVA Tenderness] : no CVA  tenderness [No Spinal Tenderness] : no spinal tenderness [No Joint Swelling] : no joint swelling [Grossly Normal Strength/Tone] : grossly normal strength/tone [No Rash] : no rash [Coordination Grossly Intact] : coordination grossly intact [No Focal Deficits] : no focal deficits [Normal Gait] : normal gait [Deep Tendon Reflexes (DTR)] : deep tendon reflexes were 2+ and symmetric [Normal Affect] : the affect was normal [Normal Insight/Judgement] : insight and judgment were intact [de-identified] : L direct inguinal hernia

## 2021-11-14 NOTE — HISTORY OF PRESENT ILLNESS
[FreeTextEntry1] : 6 month follow up [de-identified] : 62 y/o M with pmh of HTN, DLD, pulmonary nodules, thyroid mass s/p partial hemithyroidectomy, osteoarthritis and chronic low back pain, CAD presents for follow up\par patient has no complaints, no fever, chills, chest pain, SOB, diarrhea, weight loss, blood in stools.\par

## 2021-11-14 NOTE — ASSESSMENT
[FreeTextEntry1] : 62 y/o M with pmh of HTN, DLD, pulmonary nodules, thyroid mass s/p partial hemithyroidectomy, osteoarthritis and and CAD presented for follow up\par \par \par # Thyroid mass s/p hemithyroidectomy, pathology positive for hyperplasia/no tumor seen\par \par - TSH 3.57\par - c/w levothyroxine\par \par # lung nodules\par \par - Ct chest performed : no changes\par - CT chest yearly\par - smoke cessation counseled; Nicotrol offered and sent to the pharmacy \par \par # CAD\par # HTN\par - c/w norvasc, aspirin\par \par # DLD\par - c/w statin\par - LDL 66\par \par \par # HCM\par - Last colonoscopy done in august. Will need colonoscopy every 3 years\par - Tdap 2015\par - pneumococcal vaccine at 65\par - Flu vaccine administered\par - Took 2 doses of Moderna\par - Shingle vaccine sent to the pharmacy \par \par Follow up in 6 months after labs

## 2021-12-02 ENCOUNTER — RX RENEWAL (OUTPATIENT)
Age: 61
End: 2021-12-02

## 2021-12-07 ENCOUNTER — NON-APPOINTMENT (OUTPATIENT)
Age: 61
End: 2021-12-07

## 2021-12-07 ENCOUNTER — APPOINTMENT (OUTPATIENT)
Dept: PODIATRY | Facility: CLINIC | Age: 61
End: 2021-12-07
Payer: MEDICARE

## 2021-12-07 ENCOUNTER — OUTPATIENT (OUTPATIENT)
Dept: OUTPATIENT SERVICES | Facility: HOSPITAL | Age: 61
LOS: 1 days | Discharge: HOME | End: 2021-12-07

## 2021-12-07 DIAGNOSIS — Z96.643 PRESENCE OF ARTIFICIAL HIP JOINT, BILATERAL: Chronic | ICD-10-CM

## 2021-12-07 DIAGNOSIS — Z98.890 OTHER SPECIFIED POSTPROCEDURAL STATES: Chronic | ICD-10-CM

## 2021-12-07 PROCEDURE — 20605 DRAIN/INJ JOINT/BURSA W/O US: CPT

## 2021-12-08 ENCOUNTER — NON-APPOINTMENT (OUTPATIENT)
Age: 61
End: 2021-12-08

## 2021-12-13 NOTE — PHYSICAL EXAM
[General Appearance - Alert] : alert [General Appearance - In No Acute Distress] : in no acute distress [General Appearance - Well Nourished] : well nourished [General Appearance - Well Developed] : well developed [General Appearance - Well-Appearing] : healthy appearing [] : normal voice and communication [2+] : left foot dorsalis pedis 2+ [Sensation] : the sensory exam was normal to light touch and pinprick [No Focal Deficits] : no focal deficits [de-identified] : Chronic midfoot pain dorsal aspect of right foot;  [FreeTextEntry1] : No rash and no skin lesion

## 2021-12-13 NOTE — HISTORY OF PRESENT ILLNESS
[FreeTextEntry1] : 62 yo male presents for steroid injection to Right Foot. Patient states that the pain was relieved for 4 weeks because of the injection given to him at the last visit. Patient is interested in another injection at the same site. Patient denies NVFC and denies SOB at this time. Denies any other pedal complaints at this time.

## 2021-12-15 NOTE — HISTORY OF PRESENT ILLNESS
[FreeTextEntry1] : Patient returns to clinic for follow up on pain of the dorsum of his right foot. Patient states that the pain was relieved for 6 weeks because of the injection given to him at the last visit. Patient is interested in another injection at the same site.pt states that injection is helping at this time and no surgical intervention at this time. pt states that no pain on the dorsum of the right foot today. Patient denies NVFC and denies SOB at this time.\par 
16-Dec-2021 05:10

## 2021-12-17 DIAGNOSIS — M25.571 PAIN IN RIGHT ANKLE AND JOINTS OF RIGHT FOOT: ICD-10-CM

## 2021-12-17 DIAGNOSIS — M19.071 PRIMARY OSTEOARTHRITIS, RIGHT ANKLE AND FOOT: ICD-10-CM

## 2021-12-17 DIAGNOSIS — M19.079 PRIMARY OSTEOARTHRITIS, UNSPECIFIED ANKLE AND FOOT: ICD-10-CM

## 2022-01-18 ENCOUNTER — OUTPATIENT (OUTPATIENT)
Dept: OUTPATIENT SERVICES | Facility: HOSPITAL | Age: 62
LOS: 1 days | Discharge: HOME | End: 2022-01-18

## 2022-01-18 ENCOUNTER — APPOINTMENT (OUTPATIENT)
Dept: PODIATRY | Facility: CLINIC | Age: 62
End: 2022-01-18
Payer: MEDICARE

## 2022-01-18 DIAGNOSIS — Z96.643 PRESENCE OF ARTIFICIAL HIP JOINT, BILATERAL: Chronic | ICD-10-CM

## 2022-01-18 DIAGNOSIS — Z98.890 OTHER SPECIFIED POSTPROCEDURAL STATES: Chronic | ICD-10-CM

## 2022-01-18 PROCEDURE — 20605 DRAIN/INJ JOINT/BURSA W/O US: CPT

## 2022-02-01 DIAGNOSIS — M79.671 PAIN IN RIGHT FOOT: ICD-10-CM

## 2022-02-01 DIAGNOSIS — M19.90 UNSPECIFIED OSTEOARTHRITIS, UNSPECIFIED SITE: ICD-10-CM

## 2022-02-01 DIAGNOSIS — M25.571 PAIN IN RIGHT ANKLE AND JOINTS OF RIGHT FOOT: ICD-10-CM

## 2022-02-01 NOTE — PHYSICAL EXAM
[General Appearance - Alert] : alert [General Appearance - In No Acute Distress] : in no acute distress [General Appearance - Well Nourished] : well nourished [General Appearance - Well Developed] : well developed [General Appearance - Well-Appearing] : healthy appearing [] : normal voice and communication [2+] : left foot dorsalis pedis 2+ [de-identified] : Chronic midfoot pain dorsal aspect of right foot;  [FreeTextEntry1] : No rash and no skin lesion [Sensation] : the sensory exam was normal to light touch and pinprick [No Focal Deficits] : no focal deficits

## 2022-02-01 NOTE — HISTORY OF PRESENT ILLNESS
[FreeTextEntry1] : 60 yo male presents for steroid injection to Right Foot. Patient states that the pain was relieved for 4 weeks because of the injection given to him at the last visit. Patient is interested in another injection at the same site. Patient denies NVFC and denies SOB at this time. Denies any other pedal complaints at this time.

## 2022-02-20 ENCOUNTER — LABORATORY RESULT (OUTPATIENT)
Age: 62
End: 2022-02-20

## 2022-02-23 ENCOUNTER — TRANSCRIPTION ENCOUNTER (OUTPATIENT)
Age: 62
End: 2022-02-23

## 2022-02-23 ENCOUNTER — OUTPATIENT (OUTPATIENT)
Dept: OUTPATIENT SERVICES | Facility: HOSPITAL | Age: 62
LOS: 1 days | Discharge: HOME | End: 2022-02-23
Payer: MEDICARE

## 2022-02-23 VITALS
WEIGHT: 197.09 LBS | RESPIRATION RATE: 18 BRPM | HEART RATE: 70 BPM | TEMPERATURE: 98 F | SYSTOLIC BLOOD PRESSURE: 128 MMHG | HEIGHT: 71 IN | DIASTOLIC BLOOD PRESSURE: 81 MMHG

## 2022-02-23 VITALS
HEART RATE: 62 BPM | TEMPERATURE: 99 F | RESPIRATION RATE: 19 BRPM | DIASTOLIC BLOOD PRESSURE: 67 MMHG | OXYGEN SATURATION: 99 % | SYSTOLIC BLOOD PRESSURE: 148 MMHG

## 2022-02-23 DIAGNOSIS — Z98.890 OTHER SPECIFIED POSTPROCEDURAL STATES: Chronic | ICD-10-CM

## 2022-02-23 DIAGNOSIS — Z96.643 PRESENCE OF ARTIFICIAL HIP JOINT, BILATERAL: Chronic | ICD-10-CM

## 2022-02-23 PROCEDURE — 45378 DIAGNOSTIC COLONOSCOPY: CPT

## 2022-02-23 RX ORDER — ROSUVASTATIN CALCIUM 5 MG/1
1 TABLET ORAL
Qty: 0 | Refills: 0 | DISCHARGE

## 2022-02-23 NOTE — ASU PATIENT PROFILE, ADULT - FALL HARM RISK - UNIVERSAL INTERVENTIONS
Bed in lowest position, wheels locked, appropriate side rails in place/Call bell, personal items and telephone in reach/Instruct patient to call for assistance before getting out of bed or chair/Non-slip footwear when patient is out of bed/[place] to call system/Physically safe environment - no spills, clutter or unnecessary equipment/Purposeful Proactive Rounding/Room/bathroom lighting operational, light cord in reach

## 2022-02-23 NOTE — ASU DISCHARGE PLAN (ADULT/PEDIATRIC) - NS MD DC FALL RISK RISK
For information on Fall & Injury Prevention, visit: [email or web address] OR  [email or web address] OR  https://www.cdc.gov/steadi/patient.html

## 2022-02-23 NOTE — ASU DISCHARGE PLAN (ADULT/PEDIATRIC) - DISCHARGE PLAN IS COMPLETE AND GIVEN TO PATIENT
Pt stood at the side of the bed to urinate, pt then returned to normal sinus rhythm, repeat EKG performed        Nishi Moon RN  02/15/18 [id number] : Yes

## 2022-02-23 NOTE — ASU PATIENT PROFILE, ADULT - MEDICATIONS BROUGHT TO HOSPITAL, PROFILE
Bed:   Expected date: 1/24/19  Expected time: 4:16 PM  Means of arrival: [facility]  Comments:  [facility], Abnormal Labs   no

## 2022-02-23 NOTE — CHART NOTE - NSCHARTNOTEFT_GEN_A_CORE
PACU ANESTHESIA ADMISSION NOTE      Procedure: colonoscopy  Post op diagnosis:      ____  Intubated  TV:______       Rate: ______      FiO2: ______    __x__  Patent Airway    ____  Full return of protective reflexes    ____  Full recovery from anesthesia / back to baseline status    Vitals:  T(C): 36.7   HR: 70   BP: 128/81   RR: 18   SpO2: 98    Mental Status:  ___x_ Awake   _____ Alert   _____ Drowsy   _____ Sedated    Nausea/Vomiting:  ____ Yes, See Post - Op Orders      _x___ No    Pain Scale (0-10):  _____    Treatment: ____ None    __x__ See Post - Op/PCA Orders    Post - Operative Fluids:   ____ Oral   __x__ See Post - Op Orders    Plan: Discharge:   __x__Home       _____Floor     _____Critical Care    _____  Other:_________________    Comments:  report given to RN DC to pacu

## 2022-02-23 NOTE — PRE-ANESTHESIA EVALUATION ADULT - NSANTHOSAYNRD_GEN_A_CORE
denies/No. AGUS screening performed.  STOP BANG Legend: 0-2 = LOW Risk; 3-4 = INTERMEDIATE Risk; 5-8 = HIGH Risk

## 2022-02-23 NOTE — H&P PST ADULT - BRAND OF COVID-19 VACCINATION
Post-Op Assessment Note      CV Status:  Stable    Mental Status:  Alert and awake    Hydration Status:  Euvolemic    PONV Controlled:  Controlled    Airway Patency:  Patent    Post Op Vitals Reviewed: Yes          Staff: Anesthesiologist           BP      Temp      Pulse     Resp      SpO2
Moderna dose 1 and 2

## 2022-03-04 DIAGNOSIS — Z12.11 ENCOUNTER FOR SCREENING FOR MALIGNANT NEOPLASM OF COLON: ICD-10-CM

## 2022-03-04 DIAGNOSIS — M54.9 DORSALGIA, UNSPECIFIED: ICD-10-CM

## 2022-03-04 DIAGNOSIS — E03.9 HYPOTHYROIDISM, UNSPECIFIED: ICD-10-CM

## 2022-03-04 DIAGNOSIS — K64.0 FIRST DEGREE HEMORRHOIDS: ICD-10-CM

## 2022-03-04 DIAGNOSIS — Z79.82 LONG TERM (CURRENT) USE OF ASPIRIN: ICD-10-CM

## 2022-03-04 DIAGNOSIS — Z88.0 ALLERGY STATUS TO PENICILLIN: ICD-10-CM

## 2022-03-04 DIAGNOSIS — E78.5 HYPERLIPIDEMIA, UNSPECIFIED: ICD-10-CM

## 2022-03-04 DIAGNOSIS — J43.9 EMPHYSEMA, UNSPECIFIED: ICD-10-CM

## 2022-03-04 DIAGNOSIS — Z87.19 PERSONAL HISTORY OF OTHER DISEASES OF THE DIGESTIVE SYSTEM: ICD-10-CM

## 2022-03-04 DIAGNOSIS — I10 ESSENTIAL (PRIMARY) HYPERTENSION: ICD-10-CM

## 2022-03-04 DIAGNOSIS — F41.9 ANXIETY DISORDER, UNSPECIFIED: ICD-10-CM

## 2022-03-04 DIAGNOSIS — M19.90 UNSPECIFIED OSTEOARTHRITIS, UNSPECIFIED SITE: ICD-10-CM

## 2022-03-04 DIAGNOSIS — K57.30 DIVERTICULOSIS OF LARGE INTESTINE WITHOUT PERFORATION OR ABSCESS WITHOUT BLEEDING: ICD-10-CM

## 2022-03-08 ENCOUNTER — OUTPATIENT (OUTPATIENT)
Dept: OUTPATIENT SERVICES | Facility: HOSPITAL | Age: 62
LOS: 1 days | Discharge: HOME | End: 2022-03-08

## 2022-03-08 ENCOUNTER — APPOINTMENT (OUTPATIENT)
Dept: PODIATRY | Facility: CLINIC | Age: 62
End: 2022-03-08
Payer: MEDICARE

## 2022-03-08 DIAGNOSIS — Z98.890 OTHER SPECIFIED POSTPROCEDURAL STATES: Chronic | ICD-10-CM

## 2022-03-08 DIAGNOSIS — Z96.643 PRESENCE OF ARTIFICIAL HIP JOINT, BILATERAL: Chronic | ICD-10-CM

## 2022-03-08 PROBLEM — E03.9 HYPOTHYROIDISM, UNSPECIFIED: Chronic | Status: ACTIVE | Noted: 2022-02-23

## 2022-03-08 PROBLEM — J44.9 CHRONIC OBSTRUCTIVE PULMONARY DISEASE, UNSPECIFIED: Chronic | Status: ACTIVE | Noted: 2022-02-23

## 2022-03-08 PROCEDURE — 20605 DRAIN/INJ JOINT/BURSA W/O US: CPT

## 2022-03-10 NOTE — PHYSICAL EXAM
[General Appearance - Alert] : alert [General Appearance - In No Acute Distress] : in no acute distress [General Appearance - Well Nourished] : well nourished [General Appearance - Well Developed] : well developed [General Appearance - Well-Appearing] : healthy appearing [] : normal voice and communication [2+] : left foot dorsalis pedis 2+ [Sensation] : the sensory exam was normal to light touch and pinprick [No Focal Deficits] : no focal deficits [de-identified] : Chronic midfoot pain dorsal aspect of right foot;  [FreeTextEntry1] : No rash and no skin lesion

## 2022-03-29 NOTE — H&P PST ADULT - ADMIT DATE
Subjective:     Patient ID: Bree Yanes is a 22 Dima Anglin MS 25-Aug-2021

## 2022-04-04 ENCOUNTER — LABORATORY RESULT (OUTPATIENT)
Age: 62
End: 2022-04-04

## 2022-04-12 ENCOUNTER — APPOINTMENT (OUTPATIENT)
Dept: PODIATRY | Facility: CLINIC | Age: 62
End: 2022-04-12
Payer: MEDICARE

## 2022-04-12 ENCOUNTER — OUTPATIENT (OUTPATIENT)
Dept: OUTPATIENT SERVICES | Facility: HOSPITAL | Age: 62
LOS: 1 days | Discharge: HOME | End: 2022-04-12

## 2022-04-12 DIAGNOSIS — Z96.643 PRESENCE OF ARTIFICIAL HIP JOINT, BILATERAL: Chronic | ICD-10-CM

## 2022-04-12 DIAGNOSIS — Z98.890 OTHER SPECIFIED POSTPROCEDURAL STATES: Chronic | ICD-10-CM

## 2022-04-12 PROCEDURE — 20605 DRAIN/INJ JOINT/BURSA W/O US: CPT

## 2022-04-14 ENCOUNTER — OUTPATIENT (OUTPATIENT)
Dept: OUTPATIENT SERVICES | Facility: HOSPITAL | Age: 62
LOS: 1 days | Discharge: HOME | End: 2022-04-14

## 2022-04-14 ENCOUNTER — APPOINTMENT (OUTPATIENT)
Dept: INTERNAL MEDICINE | Facility: CLINIC | Age: 62
End: 2022-04-14
Payer: MEDICARE

## 2022-04-14 VITALS
BODY MASS INDEX: 28.04 KG/M2 | SYSTOLIC BLOOD PRESSURE: 144 MMHG | TEMPERATURE: 97.8 F | WEIGHT: 207 LBS | DIASTOLIC BLOOD PRESSURE: 86 MMHG | OXYGEN SATURATION: 98 % | HEIGHT: 72 IN | HEART RATE: 55 BPM

## 2022-04-14 DIAGNOSIS — R04.0 EPISTAXIS: ICD-10-CM

## 2022-04-14 DIAGNOSIS — Z96.643 PRESENCE OF ARTIFICIAL HIP JOINT, BILATERAL: Chronic | ICD-10-CM

## 2022-04-14 DIAGNOSIS — Z72.0 TOBACCO USE: ICD-10-CM

## 2022-04-14 DIAGNOSIS — Z98.890 OTHER SPECIFIED POSTPROCEDURAL STATES: Chronic | ICD-10-CM

## 2022-04-14 PROCEDURE — 99214 OFFICE O/P EST MOD 30 MIN: CPT | Mod: GC

## 2022-04-14 RX ORDER — NICOTINE 4 MG/1
10 INHALANT RESPIRATORY (INHALATION)
Qty: 30 | Refills: 11 | Status: DISCONTINUED | COMMUNITY
Start: 2021-10-14 | End: 2022-04-14

## 2022-04-14 RX ORDER — NICOTINE 21 MG/24HR
14 PATCH, TRANSDERMAL 24 HOURS TRANSDERMAL DAILY
Qty: 30 | Refills: 5 | Status: DISCONTINUED | COMMUNITY
Start: 2018-09-11 | End: 2022-04-14

## 2022-04-14 RX ORDER — POLYETHYLENE GLYCOL 3350 AND ELECTROLYTES WITH LEMON FLAVOR 236; 22.74; 6.74; 5.86; 2.97 G/4L; G/4L; G/4L; G/4L; G/4L
236 POWDER, FOR SOLUTION ORAL
Qty: 1 | Refills: 0 | Status: DISCONTINUED | COMMUNITY
Start: 2021-10-29 | End: 2022-04-14

## 2022-04-15 NOTE — HISTORY OF PRESENT ILLNESS
[FreeTextEntry1] : CC: "My R foot hurts"\par HPI:\par Mr. Kendrick came back in a month for R foot dorsum pain that gets better w/ injection, monthly getting injected for pain management; \par Eval R foot;

## 2022-04-15 NOTE — PHYSICAL EXAM
[General Appearance - Alert] : alert [General Appearance - In No Acute Distress] : in no acute distress [General Appearance - Well Nourished] : well nourished [General Appearance - Well Developed] : well developed [Sensation] : the sensory exam was normal to light touch and pinprick [No Focal Deficits] : no focal deficits [de-identified] : R dorsum midfoot joint pain on palpation  [FreeTextEntry1] : No abnormality was found on R foot skin

## 2022-04-15 NOTE — ASSESSMENT
[FreeTextEntry1] : Assessment: R foot dorsum joint pain\par Plan:\par Pt seen and eval\par Injection of 1cc 1% lido and 1cc 1% Kenalog 10\par F/u in a month

## 2022-04-18 DIAGNOSIS — R91.8 OTHER NONSPECIFIC ABNORMAL FINDING OF LUNG FIELD: ICD-10-CM

## 2022-04-18 DIAGNOSIS — Z72.0 TOBACCO USE: ICD-10-CM

## 2022-04-18 DIAGNOSIS — R04.0 EPISTAXIS: ICD-10-CM

## 2022-04-18 DIAGNOSIS — E03.9 HYPOTHYROIDISM, UNSPECIFIED: ICD-10-CM

## 2022-04-18 DIAGNOSIS — I10 ESSENTIAL (PRIMARY) HYPERTENSION: ICD-10-CM

## 2022-04-18 DIAGNOSIS — K21.9 GASTRO-ESOPHAGEAL REFLUX DISEASE WITHOUT ESOPHAGITIS: ICD-10-CM

## 2022-04-18 DIAGNOSIS — E78.5 HYPERLIPIDEMIA, UNSPECIFIED: ICD-10-CM

## 2022-04-18 DIAGNOSIS — M19.071 PRIMARY OSTEOARTHRITIS, RIGHT ANKLE AND FOOT: ICD-10-CM

## 2022-04-18 DIAGNOSIS — E04.1 NONTOXIC SINGLE THYROID NODULE: ICD-10-CM

## 2022-04-18 DIAGNOSIS — M25.571 PAIN IN RIGHT ANKLE AND JOINTS OF RIGHT FOOT: ICD-10-CM

## 2022-04-20 ENCOUNTER — RESULT REVIEW (OUTPATIENT)
Age: 62
End: 2022-04-20

## 2022-04-29 ENCOUNTER — OUTPATIENT (OUTPATIENT)
Dept: OUTPATIENT SERVICES | Facility: HOSPITAL | Age: 62
LOS: 1 days | Discharge: HOME | End: 2022-04-29

## 2022-04-29 DIAGNOSIS — Z96.643 PRESENCE OF ARTIFICIAL HIP JOINT, BILATERAL: Chronic | ICD-10-CM

## 2022-04-29 DIAGNOSIS — Z98.890 OTHER SPECIFIED POSTPROCEDURAL STATES: Chronic | ICD-10-CM

## 2022-05-06 ENCOUNTER — OUTPATIENT (OUTPATIENT)
Dept: OUTPATIENT SERVICES | Facility: HOSPITAL | Age: 62
LOS: 1 days | Discharge: HOME | End: 2022-05-06

## 2022-05-06 DIAGNOSIS — Z98.890 OTHER SPECIFIED POSTPROCEDURAL STATES: Chronic | ICD-10-CM

## 2022-05-06 DIAGNOSIS — Z96.643 PRESENCE OF ARTIFICIAL HIP JOINT, BILATERAL: Chronic | ICD-10-CM

## 2022-05-09 NOTE — ASSESSMENT
[FreeTextEntry1] : This is a 61 year old male with PMHx of HTN, HLD, Thyroid mass s/p partial thyroidectomy, OA, Low back pain, CAD, pulmonary nodules who presented to the office for routine follow up appointment\par \par #HTN\par - BP noted 144/86 in office but monitoring at home notes 124/82. \par - Continue on Amlodipine 5mg PO daily\par \par #HLD\par - Lipid levels noted at goal\par - Continue on Rosuvastatin 10mg PO qHS\par \par #Tobacco Abuse\par #Lung Nodule\par - Annual screening CT ordered for September\par - Smoking cessation counseling provided\par - Has failed on Nicotrol, Nicotine Patch, Nicotine gum\par - Trial of Chantix\par \par #CAD\par - Continue on ASA 81mg PO daily \par \par #GERD\par - Continue on Protonix daily \par \par HCM\par - Colonoscopy due 2025\par - Pneumonia vaccine at 65\par - Shingles vaccine completed\par - Covid vaccine up to date \par - Annual flu shot completed \par - Patient requesting PSA screening\par - RTC in 6 months\par - Repeat labs in 6 months\par - Medications renewed

## 2022-05-09 NOTE — HISTORY OF PRESENT ILLNESS
[FreeTextEntry1] : Follow Up  [de-identified] : This is a 61 year old male with PMHx of HTN, HLD, Thyroid mass s/p partial thyroidectomy, OA, Low back pain, CAD, pulmonary nodules who presented to the office for routine follow up appointment. Patient reports feeling well with no acute complaints. He is requesting to review his labs and renew medications today. Of note recently completed repeat Colonoscopy and due for follow up with GI. He will need repeat Colon in 3 years.

## 2022-05-19 NOTE — ASU PATIENT PROFILE, ADULT - ABILITY TO HEAR (WITH HEARING AID OR HEARING APPLIANCE IF NORMALLY USED):
Patient is critically ill, requiring critical care services.
Adequate: hears normal conversation without difficulty

## 2022-05-24 ENCOUNTER — APPOINTMENT (OUTPATIENT)
Dept: PODIATRY | Facility: CLINIC | Age: 62
End: 2022-05-24
Payer: MEDICARE

## 2022-05-24 ENCOUNTER — OUTPATIENT (OUTPATIENT)
Dept: OUTPATIENT SERVICES | Facility: HOSPITAL | Age: 62
LOS: 1 days | Discharge: HOME | End: 2022-05-24

## 2022-05-24 DIAGNOSIS — Z96.643 PRESENCE OF ARTIFICIAL HIP JOINT, BILATERAL: Chronic | ICD-10-CM

## 2022-05-24 DIAGNOSIS — Z98.890 OTHER SPECIFIED POSTPROCEDURAL STATES: Chronic | ICD-10-CM

## 2022-05-24 PROCEDURE — 20605 DRAIN/INJ JOINT/BURSA W/O US: CPT

## 2022-05-24 NOTE — PHYSICAL EXAM
[General Appearance - Alert] : alert [General Appearance - In No Acute Distress] : in no acute distress [General Appearance - Well Nourished] : well nourished [General Appearance - Well Developed] : well developed [de-identified] : R dorsum midfoot joint pain on palpation  [FreeTextEntry1] : No abnormality was found on R foot skin [Sensation] : the sensory exam was normal to light touch and pinprick [No Focal Deficits] : no focal deficits

## 2022-06-21 ENCOUNTER — OUTPATIENT (OUTPATIENT)
Dept: OUTPATIENT SERVICES | Facility: HOSPITAL | Age: 62
LOS: 1 days | Discharge: HOME | End: 2022-06-21

## 2022-06-21 ENCOUNTER — APPOINTMENT (OUTPATIENT)
Dept: PODIATRY | Facility: CLINIC | Age: 62
End: 2022-06-21
Payer: MEDICARE

## 2022-06-21 DIAGNOSIS — Z98.890 OTHER SPECIFIED POSTPROCEDURAL STATES: Chronic | ICD-10-CM

## 2022-06-21 DIAGNOSIS — Z96.643 PRESENCE OF ARTIFICIAL HIP JOINT, BILATERAL: Chronic | ICD-10-CM

## 2022-06-21 PROCEDURE — 20605 DRAIN/INJ JOINT/BURSA W/O US: CPT

## 2022-06-28 NOTE — PHYSICAL EXAM
[General Appearance - Alert] : alert [General Appearance - In No Acute Distress] : in no acute distress [General Appearance - Well Nourished] : well nourished [General Appearance - Well Developed] : well developed [Sensation] : the sensory exam was normal to light touch and pinprick [No Focal Deficits] : no focal deficits [de-identified] : R dorsum midfoot joint pain on palpation  [FreeTextEntry1] : No abnormality was found on R foot skin

## 2022-06-28 NOTE — PHYSICAL EXAM
[General Appearance - Alert] : alert [General Appearance - In No Acute Distress] : in no acute distress [General Appearance - Well Nourished] : well nourished [General Appearance - Well Developed] : well developed [Sensation] : the sensory exam was normal to light touch and pinprick [No Focal Deficits] : no focal deficits [de-identified] : R dorsum midfoot joint pain on palpation  [FreeTextEntry1] : No abnormality was found on R foot skin

## 2022-07-01 ENCOUNTER — NON-APPOINTMENT (OUTPATIENT)
Age: 62
End: 2022-07-01

## 2022-07-25 ENCOUNTER — OUTPATIENT (OUTPATIENT)
Dept: OUTPATIENT SERVICES | Facility: HOSPITAL | Age: 62
LOS: 1 days | Discharge: HOME | End: 2022-07-25

## 2022-07-25 DIAGNOSIS — Z96.643 PRESENCE OF ARTIFICIAL HIP JOINT, BILATERAL: Chronic | ICD-10-CM

## 2022-07-25 DIAGNOSIS — Z98.890 OTHER SPECIFIED POSTPROCEDURAL STATES: Chronic | ICD-10-CM

## 2022-07-28 ENCOUNTER — RESULT REVIEW (OUTPATIENT)
Age: 62
End: 2022-07-28

## 2022-08-02 ENCOUNTER — OUTPATIENT (OUTPATIENT)
Dept: OUTPATIENT SERVICES | Facility: HOSPITAL | Age: 62
LOS: 1 days | Discharge: HOME | End: 2022-08-02

## 2022-08-02 ENCOUNTER — APPOINTMENT (OUTPATIENT)
Dept: PODIATRY | Facility: CLINIC | Age: 62
End: 2022-08-02

## 2022-08-02 DIAGNOSIS — M25.562 PAIN IN LEFT KNEE: ICD-10-CM

## 2022-08-02 DIAGNOSIS — M79.671 PAIN IN RIGHT FOOT: ICD-10-CM

## 2022-08-02 DIAGNOSIS — Z02.9 ENCOUNTER FOR ADMINISTRATIVE EXAMINATIONS, UNSPECIFIED: ICD-10-CM

## 2022-08-02 DIAGNOSIS — Z96.643 PRESENCE OF ARTIFICIAL HIP JOINT, BILATERAL: Chronic | ICD-10-CM

## 2022-08-02 DIAGNOSIS — Z98.890 OTHER SPECIFIED POSTPROCEDURAL STATES: Chronic | ICD-10-CM

## 2022-08-02 DIAGNOSIS — M25.571 PAIN IN RIGHT ANKLE AND JOINTS OF RIGHT FOOT: ICD-10-CM

## 2022-08-02 DIAGNOSIS — M25.579 PAIN IN UNSPECIFIED ANKLE AND JOINTS OF UNSPECIFIED FOOT: ICD-10-CM

## 2022-08-02 PROCEDURE — 73562 X-RAY EXAM OF KNEE 3: CPT | Mod: 26,LT

## 2022-08-02 PROCEDURE — 20605 DRAIN/INJ JOINT/BURSA W/O US: CPT

## 2022-08-05 ENCOUNTER — OUTPATIENT (OUTPATIENT)
Dept: OUTPATIENT SERVICES | Facility: HOSPITAL | Age: 62
LOS: 1 days | Discharge: HOME | End: 2022-08-05

## 2022-08-05 DIAGNOSIS — Z98.890 OTHER SPECIFIED POSTPROCEDURAL STATES: Chronic | ICD-10-CM

## 2022-08-05 DIAGNOSIS — Z96.643 PRESENCE OF ARTIFICIAL HIP JOINT, BILATERAL: Chronic | ICD-10-CM

## 2022-08-15 ENCOUNTER — OUTPATIENT (OUTPATIENT)
Dept: OUTPATIENT SERVICES | Facility: HOSPITAL | Age: 62
LOS: 1 days | Discharge: HOME | End: 2022-08-15

## 2022-08-15 DIAGNOSIS — Z98.890 OTHER SPECIFIED POSTPROCEDURAL STATES: Chronic | ICD-10-CM

## 2022-08-15 DIAGNOSIS — Z96.643 PRESENCE OF ARTIFICIAL HIP JOINT, BILATERAL: Chronic | ICD-10-CM

## 2022-08-16 DIAGNOSIS — K05.6 PERIODONTAL DISEASE, UNSPECIFIED: ICD-10-CM

## 2022-08-17 PROBLEM — M79.671 RIGHT FOOT PAIN: Status: ACTIVE | Noted: 2018-06-05

## 2022-08-17 NOTE — PHYSICAL EXAM
[General Appearance - Alert] : alert [General Appearance - In No Acute Distress] : in no acute distress [General Appearance - Well Nourished] : well nourished [General Appearance - Well Developed] : well developed [Sensation] : the sensory exam was normal to light touch and pinprick [No Focal Deficits] : no focal deficits [de-identified] : R dorsum midfoot joint pain on palpation  [FreeTextEntry1] : No abnormality was found on R foot skin

## 2022-08-17 NOTE — ASSESSMENT
[FreeTextEntry1] : Assessment: R foot dorsum joint pain, arthritis of foot\par \par Plan:\par Pt seen and eval\par Injection of 1cc 1% lido and 0.5cc 1% Kenalog 10 and 0.5 cc betamethasone\par F/u in 1 month

## 2022-08-17 NOTE — PROCEDURE
[Right Foot] : was performed on the right foot [Therapeutic] : therapeutic [Consent Obtained] : Written consent was obtained prior to the procedure and is detailed in the patient's record [Patient] : Prior to the start of the procedure a time out was taken and the identity of the patient was confirmed via name and date of birth with the patient. The correct site and the procedure to be performed were confirmed. The correct side was confirmed if applicable. The availability of the correct equipment was verified [Ethyl Chloride] : ethyl chloride [___ ml Inj] : [unfilled] ~Uml [1%] : 1%  [Kenalog 10] : Kenalog 10 [Betamethasone] : Betamethasone [Tolerated Well] : tolerated the procedure well [Reports Improvement in Symptoms] : reports improvement in symptoms [No Complications] : There were no complications. [Instructions Given] : given handouts/patient instructions [Patient Instructed to Call] : instructed to call if redness at site, a decrease in range of motion or an increase in pain is noted after procedure. [de-identified] : 0.5 cc each

## 2022-08-17 NOTE — HISTORY OF PRESENT ILLNESS
[Sneakers] : preet [FreeTextEntry1] : \par Mr. Kendrick came back in a month for R foot dorsum pain due to arthritis that gets better w/ monthly injections

## 2022-09-06 ENCOUNTER — APPOINTMENT (OUTPATIENT)
Dept: PODIATRY | Facility: CLINIC | Age: 62
End: 2022-09-06

## 2022-09-06 ENCOUNTER — OUTPATIENT (OUTPATIENT)
Dept: OUTPATIENT SERVICES | Facility: HOSPITAL | Age: 62
LOS: 1 days | Discharge: HOME | End: 2022-09-06

## 2022-09-06 DIAGNOSIS — Z98.890 OTHER SPECIFIED POSTPROCEDURAL STATES: Chronic | ICD-10-CM

## 2022-09-06 DIAGNOSIS — Z96.643 PRESENCE OF ARTIFICIAL HIP JOINT, BILATERAL: Chronic | ICD-10-CM

## 2022-09-06 DIAGNOSIS — M19.071 PRIMARY OSTEOARTHRITIS, RIGHT ANKLE AND FOOT: ICD-10-CM

## 2022-09-06 DIAGNOSIS — M25.571 PAIN IN RIGHT ANKLE AND JOINTS OF RIGHT FOOT: ICD-10-CM

## 2022-09-06 DIAGNOSIS — M79.671 PAIN IN RIGHT FOOT: ICD-10-CM

## 2022-09-06 PROCEDURE — 20605 DRAIN/INJ JOINT/BURSA W/O US: CPT

## 2022-09-22 NOTE — PHYSICAL EXAM
[General Appearance - Alert] : alert [General Appearance - In No Acute Distress] : in no acute distress [General Appearance - Well Nourished] : well nourished [General Appearance - Well Developed] : well developed [de-identified] : R dorsum midfoot joint pain on palpation  [FreeTextEntry1] : No abnormality was found on R foot skin [Sensation] : the sensory exam was normal to light touch and pinprick [No Focal Deficits] : no focal deficits

## 2022-09-22 NOTE — PROCEDURE
[Right Foot] : was performed on the right foot [Therapeutic] : therapeutic [Consent Obtained] : Written consent was obtained prior to the procedure and is detailed in the patient's record [Patient] : Prior to the start of the procedure a time out was taken and the identity of the patient was confirmed via name and date of birth with the patient. The correct site and the procedure to be performed were confirmed. The correct side was confirmed if applicable. The availability of the correct equipment was verified [Ethyl Chloride] : ethyl chloride [___ ml Inj] : [unfilled] ~Uml [1%] : 1%  [Kenalog 10] : Kenalog 10 [Betamethasone] : Betamethasone [Tolerated Well] : tolerated the procedure well [Reports Improvement in Symptoms] : reports improvement in symptoms [No Complications] : There were no complications. [Instructions Given] : given handouts/patient instructions [Patient Instructed to Call] : instructed to call if redness at site, a decrease in range of motion or an increase in pain is noted after procedure. [de-identified] : 0.5 cc each

## 2022-09-26 ENCOUNTER — OUTPATIENT (OUTPATIENT)
Dept: OUTPATIENT SERVICES | Facility: HOSPITAL | Age: 62
LOS: 1 days | Discharge: HOME | End: 2022-09-26

## 2022-09-26 DIAGNOSIS — Z96.643 PRESENCE OF ARTIFICIAL HIP JOINT, BILATERAL: Chronic | ICD-10-CM

## 2022-09-26 DIAGNOSIS — R91.8 OTHER NONSPECIFIC ABNORMAL FINDING OF LUNG FIELD: ICD-10-CM

## 2022-09-26 DIAGNOSIS — Z98.890 OTHER SPECIFIED POSTPROCEDURAL STATES: Chronic | ICD-10-CM

## 2022-09-26 PROCEDURE — 71250 CT THORAX DX C-: CPT | Mod: 26,MF

## 2022-09-26 PROCEDURE — G1004: CPT

## 2022-10-04 ENCOUNTER — OUTPATIENT (OUTPATIENT)
Dept: OUTPATIENT SERVICES | Facility: HOSPITAL | Age: 62
LOS: 1 days | Discharge: HOME | End: 2022-10-04

## 2022-10-04 ENCOUNTER — APPOINTMENT (OUTPATIENT)
Dept: PODIATRY | Facility: CLINIC | Age: 62
End: 2022-10-04

## 2022-10-04 DIAGNOSIS — Z96.643 PRESENCE OF ARTIFICIAL HIP JOINT, BILATERAL: Chronic | ICD-10-CM

## 2022-10-04 DIAGNOSIS — Z98.890 OTHER SPECIFIED POSTPROCEDURAL STATES: Chronic | ICD-10-CM

## 2022-10-04 PROCEDURE — 20605 DRAIN/INJ JOINT/BURSA W/O US: CPT

## 2022-10-05 ENCOUNTER — LABORATORY RESULT (OUTPATIENT)
Age: 62
End: 2022-10-05

## 2022-10-05 DIAGNOSIS — M19.071 PRIMARY OSTEOARTHRITIS, RIGHT ANKLE AND FOOT: ICD-10-CM

## 2022-10-05 DIAGNOSIS — M19.079 PRIMARY OSTEOARTHRITIS, UNSPECIFIED ANKLE AND FOOT: ICD-10-CM

## 2022-10-05 DIAGNOSIS — M25.571 PAIN IN RIGHT ANKLE AND JOINTS OF RIGHT FOOT: ICD-10-CM

## 2022-10-05 NOTE — HISTORY OF PRESENT ILLNESS
[Sneakers] : preet [FreeTextEntry1] : \par Mr. Kendrick came back in a month for R foot dorsum pain due to arthritis that gets better w/ monthly injections\par \par Returns for continued care 10/4/22

## 2022-10-05 NOTE — PROCEDURE
[Right Foot] : was performed on the right foot [Therapeutic] : therapeutic [Consent Obtained] : Written consent was obtained prior to the procedure and is detailed in the patient's record [Patient] : Prior to the start of the procedure a time out was taken and the identity of the patient was confirmed via name and date of birth with the patient. The correct site and the procedure to be performed were confirmed. The correct side was confirmed if applicable. The availability of the correct equipment was verified [Ethyl Chloride] : ethyl chloride [___ ml Inj] : [unfilled] ~Uml [2%] : 2% [Alcohol] : alcohol [25 gauge] : A 25 gauge needle was used [Kenalog 10] : Kenalog 10 [Betamethasone] : Betamethasone [Tolerated Well] : tolerated the procedure well [Reports Improvement in Symptoms] : reports improvement in symptoms [No Complications] : There were no complications. [Instructions Given] : given handouts/patient instructions [Patient Instructed to Call] : instructed to call if redness at site, a decrease in range of motion or an increase in pain is noted after procedure. [de-identified] : 0.5 cc each

## 2022-10-05 NOTE — PHYSICAL EXAM
[General Appearance - Alert] : alert [General Appearance - In No Acute Distress] : in no acute distress [General Appearance - Well Nourished] : well nourished [General Appearance - Well Developed] : well developed [Sensation] : the sensory exam was normal to light touch and pinprick [No Focal Deficits] : no focal deficits [de-identified] : R dorsum midfoot joint pain on palpation  [FreeTextEntry1] : No abnormality was found on R foot skin

## 2022-10-17 ENCOUNTER — OUTPATIENT (OUTPATIENT)
Dept: OUTPATIENT SERVICES | Facility: HOSPITAL | Age: 62
LOS: 1 days | Discharge: HOME | End: 2022-10-17

## 2022-10-17 ENCOUNTER — APPOINTMENT (OUTPATIENT)
Dept: INTERNAL MEDICINE | Facility: CLINIC | Age: 62
End: 2022-10-17

## 2022-10-17 VITALS
OXYGEN SATURATION: 99 % | TEMPERATURE: 91.2 F | DIASTOLIC BLOOD PRESSURE: 91 MMHG | HEIGHT: 72 IN | SYSTOLIC BLOOD PRESSURE: 147 MMHG | WEIGHT: 203 LBS | BODY MASS INDEX: 27.5 KG/M2 | HEART RATE: 52 BPM

## 2022-10-17 DIAGNOSIS — Z96.643 PRESENCE OF ARTIFICIAL HIP JOINT, BILATERAL: Chronic | ICD-10-CM

## 2022-10-17 DIAGNOSIS — Z23 ENCOUNTER FOR IMMUNIZATION: ICD-10-CM

## 2022-10-17 DIAGNOSIS — K52.9 NONINFECTIVE GASTROENTERITIS AND COLITIS, UNSPECIFIED: ICD-10-CM

## 2022-10-17 DIAGNOSIS — Z98.890 OTHER SPECIFIED POSTPROCEDURAL STATES: Chronic | ICD-10-CM

## 2022-10-17 PROCEDURE — 99214 OFFICE O/P EST MOD 30 MIN: CPT | Mod: GC

## 2022-10-17 NOTE — ASSESSMENT
[FreeTextEntry1] : 61 y/o M with pmh of HTN, DLD, pulmonary nodules, thyroid mass s/p partial hemithyroidectomy, osteoarthritis and and CAD presented for follow up\par \par #Chronic watery diarrhea resolved likely d/t lactose intolerance\par #Reflux esophagitis\par - stool infectious, ibd and malabsorptive workup unremarkable\par - EGD 8/21: irregular z line suspicious for barrettes but bx showed eosinphilis only consistent w/ reflux esophagitis and no intestinal metaplsia or dysplasia. duodenitns and no h.pylori\par - CF 8/21: inadquate prep - random bx non revealing for microscopic colitis. 6 polyps removed w/ largest TA was 12mm in AC. Pt to have repeat colonoscopy d/t inaequate prep\par - Colonoscopy Feb 2022 - diverticula seen \par - advised to avoid lactose products \par \par # Thyroid mass s/p hemithyroidectomy, pathology positive for hyperplasia/no tumor seen\par - TSH 4.45 -> 0.87\par - c/w levothyroxine\par \par # lung nodules\par - CT chest yearly ; Sep 2022 ; no new or growig nodes \par - smoke cessation counseled; Nicotrol offered and sent to the pharmacy \par \par # CAD\par # HTN\par - c/w norvasc, aspirin\par \par # DLD\par - c/w statin\par \par \par # HCM\par - Last colonoscopy done in Feb. \par - Tdap 2015\par - pneumococcal vaccine at 65\par - Flu vaccine administered today \par - Took 2 doses of Moderna\par \par Follow up in 6 months \par \par \par

## 2022-10-17 NOTE — HISTORY OF PRESENT ILLNESS
[FreeTextEntry1] : Here for 6mo general f/u [de-identified] : 63 y/o M \par PMH : HTN, DLD, pulmonary nodules, thyroid mass s/p partial hemithyroidectomy, osteoarthritis and chronic low back pain, R foot pain s/p lido injection\par Presents for\par Persistent sciatica flares \par R Shoulder pain - mild and waxing/ waning ; injury last year \par Requesting Prescription for Lactaid pills\par Flu shot\par  \par Labs : last done Oct 5 2022 : grossly negative \par last A1c in April 5.7\par \par

## 2022-10-18 DIAGNOSIS — M54.50 LOW BACK PAIN, UNSPECIFIED: ICD-10-CM

## 2022-10-18 DIAGNOSIS — K52.9 NONINFECTIVE GASTROENTERITIS AND COLITIS, UNSPECIFIED: ICD-10-CM

## 2022-11-08 ENCOUNTER — OUTPATIENT (OUTPATIENT)
Dept: OUTPATIENT SERVICES | Facility: HOSPITAL | Age: 62
LOS: 1 days | Discharge: HOME | End: 2022-11-08

## 2022-11-08 ENCOUNTER — APPOINTMENT (OUTPATIENT)
Dept: PODIATRY | Facility: CLINIC | Age: 62
End: 2022-11-08

## 2022-11-08 DIAGNOSIS — Z96.643 PRESENCE OF ARTIFICIAL HIP JOINT, BILATERAL: Chronic | ICD-10-CM

## 2022-11-08 DIAGNOSIS — Z98.890 OTHER SPECIFIED POSTPROCEDURAL STATES: Chronic | ICD-10-CM

## 2022-11-08 PROCEDURE — 20551 NJX 1 TENDON ORIGIN/INSJ: CPT

## 2022-11-20 NOTE — PROCEDURE
[Right Foot] : was performed on the right foot [Therapeutic] : therapeutic [Consent Obtained] : Written consent was obtained prior to the procedure and is detailed in the patient's record [Patient] : Prior to the start of the procedure a time out was taken and the identity of the patient was confirmed via name and date of birth with the patient. The correct site and the procedure to be performed were confirmed. The correct side was confirmed if applicable. The availability of the correct equipment was verified [Ethyl Chloride] : ethyl chloride [___ ml Inj] : [unfilled] ~Uml [1%] : 1%  [Kenalog 10] : Kenalog 10 [Betamethasone] : Betamethasone [Tolerated Well] : tolerated the procedure well [Reports Improvement in Symptoms] : reports improvement in symptoms [No Complications] : There were no complications. [Instructions Given] : given handouts/patient instructions [Patient Instructed to Call] : instructed to call if redness at site, a decrease in range of motion or an increase in pain is noted after procedure. [de-identified] : 0.5 cc each

## 2022-12-02 NOTE — ASU PREOP CHECKLIST - HEIGHT IN INCHES
[facility] [place]  [facility]  Advanced Heart Failure Visit      Date of Visit:  12/5/2022  Patient Name:  Catrina Peoples  Medical Record Number:  [id number]  YOB: 1967   Primary Clinician:  Isidro Purdy MD  Referring Clinician: Self referral    No chief complaint on file.    Catrina Peoples is a 55 year old female who presents to the clinic with the following CV (cardiovascular) history:    #1 Non-Ischemic Dilated Cardiomyopathy - EF 17% with LVIDD 7.6 cm on echo 4/15/2021  #2 S/p CardioMems at [facility] in 2016 -Did not have machine  #3 Chronic Atrial Fibrillation  #4 S/p ICD (Last Gen exchange in 2014) - Need to establish with EP at [facility]  #5 Obesity - There is no height or weight on file to calculate BMI.  #6 CKD stage II    HISTORICAL EPISODES  04/14 - 04/18/2021:  Admitted with CHF exacerbation and diuresed approximately 12 lbs.   9/20-9/23/2022: admit for acute onset severe headache, later found to be unconscious by coworkers with seizure-like activity. CT Head obtained and revealed bilateral frontal, parietal and interhemispheric subarachnoid hemorrhage. During admission to NICU she began having runs of NSVT, on ICD interrogation was found to have VF of 20 seconds on 9/20 resulting in ICD shock; she was initiated on PO and IV Amiodarone. Started on Jaridance. Underwent CardioMEMs implant at [facility].     Patient presents for post hospital discharge follow-up:      She is unaccompanied.    She reports since being in the hospital she has been feeling worse.  She has headache, can not sleep, nauseous, seeing spots like halos in her eyes.  EP staff have attempted to call patient in regards to the symptoms inches to was recently started on amiodarone without call back.  Will give number to patient for her to call them.    She denies any shortness of breath at rest with her ADLs or activities.  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  She denies any cardiopulmonary limitations.      She denies any chest pain, dizziness, lightheadedness orthopnea, paroxysmal nocturnal dyspnea or lower extremity swelling.  She reports that she is always bloated.         COMPLIANCE   Description   [x]  YES    []  NO Medication:   [x]  YES    []  NO Diet:    SUBSTANCE USE  []  YES    [x]  NO Tobacco:   []  YES    [x]  NO Alcohol:  []  YES    [x]  NO Other substances:     MEDICATIONS:  Current Outpatient Medications   Medication Sig Dispense Refill   • benzonatate (TESSALON PERLES) 100 MG capsule Take 1 capsule by mouth 3 times daily as needed for Cough. 30 capsule 0   • guaiFENesin-DM (MUCINEX DM)  MG per 12 hr tablet Take 1 tablet by mouth every 12 hours. 15 tablet 0   • albuterol 108 (90 Base) MCG/ACT inhaler Inhale 2 puffs into the lungs every 4 hours as needed for Wheezing. 8.5 g 0   • AMIODarone (PACERONE) 200 MG tablet Take 1 tablet by mouth daily. 90 tablet 3   • empagliflozin (JARDIANCE) 10 MG tablet Take 1 tablet by mouth daily (before breakfast). 30 tablet 11   • apixaBAN (Eliquis) 5 MG Tab Take 1 tablet by mouth every 12 hours. Do not start before September 27, 2022. 180 tablet 1   • valsartan (DIOVAN) 40 MG tablet Take 1 tablet by mouth daily. 30 tablet 2   • bumetanide (BUMEX) 1 MG tablet Take 2 mg by mouth daily.     • Cholecalciferol (Vitamin D3) 50 mcg (2,000 units) capsule Take 50 mcg by mouth daily.     • atorvastatin (LIPITOR) 40 MG tablet Take 40 mg by mouth daily.   0   • carvedilol (COREG) 3.125 MG tablet Take 3.125 mg by mouth in the morning and 3.125 mg in the evening. Take with meals.     • digoxin (LANOXIN) 0.125 MG tablet TAKE 1 TABLET BY MOUTH DAILY     • magnesium oxide (MAG-OX) 400 MG tablet TAKE 1 TABLET BY MOUTH DAILY     • spironolactone (ALDACTONE) 25 MG tablet Take 25 mg by mouth daily.   0     No current facility-administered medications  for this visit.     Allergies, Past Medical, Family and Social History reviewed in Epic and edited where appropriate.     PHYSICAL EXAMINATION:  Vitals:    There were no vitals taken for this visit.  BMI:  There is no height or weight on file to calculate BMI.  Constitutional:  obese 55 year old female in no acute distress  Skin:  warm, dry, intact, no lesions  HEENT:  normocephalic, atraumatic; oral mucous membranes moist; extraocular movements intact  Neck:  supple; trachea midline; JVP = 8 cm H2O; negative hepatojugular reflux; negative carotid bruits bilateral  Cardiovascular:  regular rate and rhythm; normal S1, S2; no murmur; negative S3  Respiratory:  anterior/posterior lung sounds clear to auscultation bilateral   Abdomen:  normal bowel sounds; abdomen soft, nontender, nondistended; no hepatomegaly  Musculoskeletal/Extremities:  Capillary refill time <3; no clubbing, no cyanosis; no peripheral edema  Neurological:  no focal neurological deficits; speech normal; sensation grossly intact  Psychiatric:  alert and oriented to person, place and time    DIAGNOSTICS:  The following diagnostics were reviewed:    Laboratory:    Recent Labs   Lab 11/23/22  0131 11/23/22 0119 11/08/22  0657 11/08/22  0652 10/05/22  0745 09/23/22  0346 09/22/22  0335   Sodium  --  137  --  139 139 140 138   Potassium  --  4.1  --  4.3 4.5 4.1 4.4   BUN  --  29*  --  17 24* 30* 26*   Creatinine 1.30* 1.24* 1.40* 1.29* 1.29* 1.44* 1.15*   Glomerular Filtration Rate 49* 51* 44* 49* 49* 43* 56*   BUN/ Creatinine Ratio  --  23  --  13 19 21 23       Recent Labs   Lab 11/23/22  0119 11/21/22  0838 11/08/22  0652 10/05/22  0745 09/20/22  1338   NT-proBNP 1,340* 1,069* 1,267* 520* 1,126*       No results for input(s): URIC in the last 8765 hours.    Recent Labs   Lab 09/20/22  1338   Hemoglobin A1C 6.2*       Recent Labs   Lab 09/22/22  0335   TSH 1.846       Recent Labs   Lab 11/23/22 0119 11/21/22  0838 11/08/22  0652 09/23/22  0346  09/22/22  0335   WBC 10.2 7.4 8.4 6.3 7.0   RBC 4.80 4.76 5.08 4.53 4.74   HGB 14.7 14.3 15.6* 13.6 14.3   HCT 45.0 46.0 47.2* 40.8 43.4   MCV 93.8 96.6 92.9 90.1 91.6   MCH 30.6 30.0 30.7 30.0 30.2   MCHC 32.7 31.1* 33.1 33.3 32.9   RDW-CV 15.6* 15.7* 15.3* 14.1 14.3    255 201 179 194       Recent Labs   Lab 09/21/22  0332   Cholesterol 202*   Triglycerides 122   HDL 38*   Cholesterol/ HDL Ratio 5.3*   CALCLDL 140*   Non-HDL Cholesterol 164       Cardiac   Ejection Fraction (%)   Date Value   04/15/2021 17   09/20/2019 21.0     LV End Systolic Longitudinal Strain Global (%)   Date Value   04/15/2021 -3   09/20/2019 -6.0     Left Ventricular Internal Dimension in Diastole (cm)   Date Value   04/15/2021 7.573   09/20/2019 7.4     MV E Wave Candelario/E Tissue Candelario Med   Date Value   04/15/2021 49.833 unitless   09/20/2019 5.5     Est Right Vent Systolic Pressure by Tricuspid Regurgitation Jet (mmHg)   Date Value   04/15/2021 56.722     Echo 8/3/2022  1. Severely increased left ventricular size, normal wall thickness, severely reduced global systolic function, calculated EF of 20 %.    2. Right ventricular cavity size is mildly enlarged, global systolic RV function is moderately reduced.    3. Moderately enlarged left atrium.    4. The mitral valve is normal, moderate mitral regurgitation.    5. Compared to prior study from 2021 the LV size and function are similar, RV size has increased and function decreased, MR is increased.     ASSESSMENT/PLAN:  Chronic HFrEF:  ACC/AHA Stage C   Etiology: Non-Ischemic    New York Heart Association Classification (NYHA):  II  Volume status is normal.   Perfusion status is normal.     Devices:  [x] ICD (implantable cardioverter-defibrillator)  [] BIV (biventricular) - ICD [] PPM (permanent pacemaker) [] Life Vest  [] CardioMEMS  Functional: yes .  : [facility]  Last interrogated  9/2022  Managed by Dr. Stevens    Heart Failure Medications:   [x] ACEi/ARB/Entresto [x] Beta  Blocker [] Calcium Channel Blocker [] Corlanor  [x] Digoxin [x] Diuretic   [] Hydralazine [x] MRA  [] Nitrate [] SGLT2-I    Recommendations:  - labs reviewed 12/02/22  - stop lisinopril  - check digoxin level today - patient last took yesterday morning  - continue on remaining medications  - patient is getting a new CardioMEMS machine and can start to do readings soon - customer service number provided in case there are any issues  - number to Dr. Stevens office provided to patient so she may follow-up with them    Education:   Heart Failure education provided per standard clinic protocols.      Advanced Heart Failure Options:  AHFT Options: No; Reason: Awaiting CPET, has not been through AHFT evaluation    Other pertinent diagnoses:      CardioMEMS:  Implanted at [facility] 2016.  States she does not have a machine. New machine being sent. Customer service number provided    Ventricular Fibrillation s/p ICD Shock 9/20/2022; Non Sustained Ventricular Tachycardia; Chronic Atrial Fibrillation: Followed by Dr. Stevens. On amiodarone, digoxin, Eliquis.       Subarachnoid Hemorrhage (Bilateral Frontal, Parietal and Interhemispheric); Hx of Right MCA Infarct / TIA:  Followed by Dr. Colon     Follow-up:  Clinic: 1 month MD  Tests: BMP, NTProBNP    Visit coordinated by:  Velia ALVARADO.     Patient understands they can return sooner if any issues should arise.     No LOS data to display  This includes pre-charting, chart review and documenting.       11

## 2022-12-13 ENCOUNTER — APPOINTMENT (OUTPATIENT)
Dept: PODIATRY | Facility: CLINIC | Age: 62
End: 2022-12-13

## 2022-12-13 ENCOUNTER — OUTPATIENT (OUTPATIENT)
Dept: OUTPATIENT SERVICES | Facility: HOSPITAL | Age: 62
LOS: 1 days | Discharge: HOME | End: 2022-12-13

## 2022-12-13 DIAGNOSIS — Z98.890 OTHER SPECIFIED POSTPROCEDURAL STATES: Chronic | ICD-10-CM

## 2022-12-13 DIAGNOSIS — Z96.643 PRESENCE OF ARTIFICIAL HIP JOINT, BILATERAL: Chronic | ICD-10-CM

## 2022-12-13 PROCEDURE — 20605 DRAIN/INJ JOINT/BURSA W/O US: CPT | Mod: RT

## 2022-12-16 NOTE — REVIEW OF SYSTEMS
Most upper respiratory infections are a viral infection, therefore antibiotics are not indicated. It can affect the nose, throat and upper air passages. typically referred to as \"the common cold\".  Symptoms can include dry or productive cough, nasal congestion and drainage, sore throat, and ear pain or feeling plugged.  These symptoms can last any where from 7-14 days.  Gargle with warm salt water if you have a sore throat  Mucinex-for productive/wet coughs  Delsym-for dry coughs  Increase oral fluid intake   Avoid dairy products because they also dry out secretions and cause mucus to thicken  Inhaled humidified air loosens/thins mucus (cool mist humidifier/sitting in bathroom with hot shower running)  Honey with lemon in tea (honey is safe for ages 2 and up. Manuka or organic honey is best)  Practice good hand hygiene  Do not share utensils/cups  Patients with hypertension/high blood pressure should not use Sudafed/pseudoephedrine   May take Ibuprofen or Tylenol (if you do not have an allergy or contraindication) every 4-6 hours if needed for pain or high fever. (take with food)  Saline Nasal spray can be used  To reduce night cough, elevate head on 2-3 pillows reclining.  You have an illness that is caused by a virus and is not treatable by antibiotic.    Antibiotics are not indicated, symptoms may last 7-14 days.      If symptoms do not resolve or get worse go to urgent care.  If you have shortness of breath or chest pain go to the nearest emergency room or call 911.     [Negative] : Psychiatric [FreeTextEntry9] : neck and right foot pain

## 2022-12-20 NOTE — PHYSICAL EXAM
[General Appearance - Alert] : alert [General Appearance - In No Acute Distress] : in no acute distress [General Appearance - Well Nourished] : well nourished [General Appearance - Well Developed] : well developed [de-identified] : R dorsum midfoot joint pain on palpation  [FreeTextEntry1] : No abnormality was found on R foot skin [Sensation] : the sensory exam was normal to light touch and pinprick [No Focal Deficits] : no focal deficits

## 2022-12-20 NOTE — END OF VISIT
Ofelia Bailey is a 55 year old female here for  Chief Complaint   Patient presents with   • Cancer     right breast cancer      Denies latex allergy or sensitivity.    Medication verified, no changes.  PCP and Pharmacy verified.    Social History     Tobacco Use   Smoking Status Former Smoker   • Types: Cigarettes   • Start date: 1/1/1984   • Quit date: 11/14/2009   • Years since quitting: 10.7   Smokeless Tobacco Never Used     Advance Directives Filed: Yes    ECOG:   ECOG   ECOG Performance Status        Height: No.  Ht Readings from Last 1 Encounters:   06/25/20 5' 6\" (1.676 m)     Weight:Yes, shoes on.  Wt Readings from Last 3 Encounters:   06/25/20 71.1 kg   05/27/20 71.1 kg   05/21/20 75 kg       BMI: There is no height or weight on file to calculate BMI.    REVIEW OF SYSTEMS  GENERAL:  Patient denies headache, fevers, chills, night sweats, excessive fatigue, change in appetite, weight loss, dizziness  ALLERGIC/IMMUNOLOGIC: Verified allergies: Yes  EYES:  Patient denies significant visual difficulties, double vision, blurred vision  ENT/MOUTH: Patient denies problems with hearing, sore throat, sinus drainage, mouth sores  ENDOCRINE:  Patient denies diabetes, thyroid disease, hormone replacement, hot flashes  HEMATOLOGIC/LYMPHATIC: Patient denies easy bruising, bleeding, tender lymph nodes, swollen lymph nodes  BREASTS: Patient denies abnormal masses of breast, nipple discharge, pain  RESPIRATORY:  Patient denies lung pain with breathing, cough, coughing up blood, shortness of breath  CARDIOVASCULAR:  Patient denies anginal chest pain, palpitations, shortness of breath when lying flat, peripheral edema  GASTROINTESTINAL: Patient denies abdominal pain , nausea, vomiting, diarrhea, GI bleeding, constipation, change in bowel habits, heartburn, sensation of feeling full, difficulty swallowing  : Patient denies abnormal genital masses, blood in the urine, frequency, urgency, burning with urination, hesitancy,  [] : Resident incontinence, vaginal bleeding, discharge  MUSCULOSKELETAL:  Patient denies joint pain, bone pain, joint swelling, redness, decreased range of motion  SKIN:  Patient denies chronic rashes, inflammation, ulcerations, skin changes, but complains of: itching on both hands,   NEUROLOGIC:  Patient denies loss of balance, areas of focal weakness, abnormal gait, sensory problems, tingling, but complains of: numbness right arm  PSYCHIATRIC: Patient denies insomnia, depression, anxiety   [Resident] : Resident

## 2022-12-20 NOTE — PROCEDURE
[Right Foot] : was performed on the right foot [Therapeutic] : therapeutic [Consent Obtained] : Written consent was obtained prior to the procedure and is detailed in the patient's record [Patient] : Prior to the start of the procedure a time out was taken and the identity of the patient was confirmed via name and date of birth with the patient. The correct site and the procedure to be performed were confirmed. The correct side was confirmed if applicable. The availability of the correct equipment was verified [Ethyl Chloride] : ethyl chloride [___ ml Inj] : [unfilled] ~Uml [1%] : 1%  [Kenalog 10] : Kenalog 10 [Betamethasone] : Betamethasone [Tolerated Well] : tolerated the procedure well [Reports Improvement in Symptoms] : reports improvement in symptoms [No Complications] : There were no complications. [Instructions Given] : given handouts/patient instructions [Patient Instructed to Call] : instructed to call if redness at site, a decrease in range of motion or an increase in pain is noted after procedure. [de-identified] : 0.5 cc each

## 2022-12-20 NOTE — ASSESSMENT
[FreeTextEntry1] : Assessment: R foot dorsum joint pain, arthritis of foot\par \par Plan:\par Pt seen and eval\par Injection of 1cc 1% lido and 0.5cc 1% Kenalog 10 and 0.5 cc betamethasone\par Instructed to continue monitoring left foot symptoms from sciatica flare up\par F/u in 1 month

## 2022-12-23 DIAGNOSIS — M19.071 PRIMARY OSTEOARTHRITIS, RIGHT ANKLE AND FOOT: ICD-10-CM

## 2022-12-23 DIAGNOSIS — M25.571 PAIN IN RIGHT ANKLE AND JOINTS OF RIGHT FOOT: ICD-10-CM

## 2023-01-05 ENCOUNTER — NON-APPOINTMENT (OUTPATIENT)
Age: 63
End: 2023-01-05

## 2023-01-22 ENCOUNTER — EMERGENCY (EMERGENCY)
Facility: HOSPITAL | Age: 63
LOS: 0 days | Discharge: HOME | End: 2023-01-22
Attending: EMERGENCY MEDICINE | Admitting: EMERGENCY MEDICINE
Payer: MEDICARE

## 2023-01-22 VITALS
RESPIRATION RATE: 18 BRPM | DIASTOLIC BLOOD PRESSURE: 82 MMHG | TEMPERATURE: 98 F | SYSTOLIC BLOOD PRESSURE: 154 MMHG | OXYGEN SATURATION: 97 % | HEART RATE: 89 BPM

## 2023-01-22 DIAGNOSIS — Z98.890 OTHER SPECIFIED POSTPROCEDURAL STATES: Chronic | ICD-10-CM

## 2023-01-22 DIAGNOSIS — Z20.822 CONTACT WITH AND (SUSPECTED) EXPOSURE TO COVID-19: ICD-10-CM

## 2023-01-22 DIAGNOSIS — Z96.643 PRESENCE OF ARTIFICIAL HIP JOINT, BILATERAL: ICD-10-CM

## 2023-01-22 DIAGNOSIS — R11.2 NAUSEA WITH VOMITING, UNSPECIFIED: ICD-10-CM

## 2023-01-22 DIAGNOSIS — M19.90 UNSPECIFIED OSTEOARTHRITIS, UNSPECIFIED SITE: ICD-10-CM

## 2023-01-22 DIAGNOSIS — I10 ESSENTIAL (PRIMARY) HYPERTENSION: ICD-10-CM

## 2023-01-22 DIAGNOSIS — Z96.643 PRESENCE OF ARTIFICIAL HIP JOINT, BILATERAL: Chronic | ICD-10-CM

## 2023-01-22 DIAGNOSIS — Z87.19 PERSONAL HISTORY OF OTHER DISEASES OF THE DIGESTIVE SYSTEM: ICD-10-CM

## 2023-01-22 DIAGNOSIS — Z79.82 LONG TERM (CURRENT) USE OF ASPIRIN: ICD-10-CM

## 2023-01-22 DIAGNOSIS — Z88.0 ALLERGY STATUS TO PENICILLIN: ICD-10-CM

## 2023-01-22 DIAGNOSIS — R42 DIZZINESS AND GIDDINESS: ICD-10-CM

## 2023-01-22 DIAGNOSIS — R07.89 OTHER CHEST PAIN: ICD-10-CM

## 2023-01-22 DIAGNOSIS — E03.9 HYPOTHYROIDISM, UNSPECIFIED: ICD-10-CM

## 2023-01-22 DIAGNOSIS — F41.9 ANXIETY DISORDER, UNSPECIFIED: ICD-10-CM

## 2023-01-22 DIAGNOSIS — E78.5 HYPERLIPIDEMIA, UNSPECIFIED: ICD-10-CM

## 2023-01-22 DIAGNOSIS — F17.200 NICOTINE DEPENDENCE, UNSPECIFIED, UNCOMPLICATED: ICD-10-CM

## 2023-01-22 DIAGNOSIS — R19.7 DIARRHEA, UNSPECIFIED: ICD-10-CM

## 2023-01-22 DIAGNOSIS — J43.9 EMPHYSEMA, UNSPECIFIED: ICD-10-CM

## 2023-01-22 LAB
ALBUMIN SERPL ELPH-MCNC: 4.1 G/DL — SIGNIFICANT CHANGE UP (ref 3.5–5.2)
ALP SERPL-CCNC: 96 U/L — SIGNIFICANT CHANGE UP (ref 30–115)
ALT FLD-CCNC: 18 U/L — SIGNIFICANT CHANGE UP (ref 0–41)
ANION GAP SERPL CALC-SCNC: 13 MMOL/L — SIGNIFICANT CHANGE UP (ref 7–14)
APTT BLD: 27.3 SEC — SIGNIFICANT CHANGE UP (ref 27–39.2)
AST SERPL-CCNC: 30 U/L — SIGNIFICANT CHANGE UP (ref 0–41)
BASOPHILS # BLD AUTO: 0.04 K/UL — SIGNIFICANT CHANGE UP (ref 0–0.2)
BASOPHILS NFR BLD AUTO: 0.3 % — SIGNIFICANT CHANGE UP (ref 0–1)
BILIRUB SERPL-MCNC: 0.5 MG/DL — SIGNIFICANT CHANGE UP (ref 0.2–1.2)
BUN SERPL-MCNC: 11 MG/DL — SIGNIFICANT CHANGE UP (ref 10–20)
CALCIUM SERPL-MCNC: 9.4 MG/DL — SIGNIFICANT CHANGE UP (ref 8.4–10.5)
CHLORIDE SERPL-SCNC: 101 MMOL/L — SIGNIFICANT CHANGE UP (ref 98–110)
CO2 SERPL-SCNC: 26 MMOL/L — SIGNIFICANT CHANGE UP (ref 17–32)
CREAT SERPL-MCNC: 0.8 MG/DL — SIGNIFICANT CHANGE UP (ref 0.7–1.5)
EGFR: 100 ML/MIN/1.73M2 — SIGNIFICANT CHANGE UP
EOSINOPHIL # BLD AUTO: 0.01 K/UL — SIGNIFICANT CHANGE UP (ref 0–0.7)
EOSINOPHIL NFR BLD AUTO: 0.1 % — SIGNIFICANT CHANGE UP (ref 0–8)
FLUAV AG NPH QL: SIGNIFICANT CHANGE UP
FLUBV AG NPH QL: SIGNIFICANT CHANGE UP
GLUCOSE SERPL-MCNC: 110 MG/DL — HIGH (ref 70–99)
HCT VFR BLD CALC: 47.4 % — SIGNIFICANT CHANGE UP (ref 42–52)
HGB BLD-MCNC: 16.6 G/DL — SIGNIFICANT CHANGE UP (ref 14–18)
IMM GRANULOCYTES NFR BLD AUTO: 0.3 % — SIGNIFICANT CHANGE UP (ref 0.1–0.3)
INR BLD: 0.88 RATIO — SIGNIFICANT CHANGE UP (ref 0.65–1.3)
LIDOCAIN IGE QN: 15 U/L — SIGNIFICANT CHANGE UP (ref 7–60)
LYMPHOCYTES # BLD AUTO: 1.75 K/UL — SIGNIFICANT CHANGE UP (ref 1.2–3.4)
LYMPHOCYTES # BLD AUTO: 11.2 % — LOW (ref 20.5–51.1)
MAGNESIUM SERPL-MCNC: 1.9 MG/DL — SIGNIFICANT CHANGE UP (ref 1.8–2.4)
MCHC RBC-ENTMCNC: 33.2 PG — HIGH (ref 27–31)
MCHC RBC-ENTMCNC: 35 G/DL — SIGNIFICANT CHANGE UP (ref 32–37)
MCV RBC AUTO: 94.8 FL — HIGH (ref 80–94)
MONOCYTES # BLD AUTO: 1 K/UL — HIGH (ref 0.1–0.6)
MONOCYTES NFR BLD AUTO: 6.4 % — SIGNIFICANT CHANGE UP (ref 1.7–9.3)
NEUTROPHILS # BLD AUTO: 12.79 K/UL — HIGH (ref 1.4–6.5)
NEUTROPHILS NFR BLD AUTO: 81.7 % — HIGH (ref 42.2–75.2)
NRBC # BLD: 0 /100 WBCS — SIGNIFICANT CHANGE UP (ref 0–0)
NT-PROBNP SERPL-SCNC: 144 PG/ML — SIGNIFICANT CHANGE UP (ref 0–300)
PLATELET # BLD AUTO: 151 K/UL — SIGNIFICANT CHANGE UP (ref 130–400)
POTASSIUM SERPL-MCNC: 4 MMOL/L — SIGNIFICANT CHANGE UP (ref 3.5–5)
POTASSIUM SERPL-SCNC: 4 MMOL/L — SIGNIFICANT CHANGE UP (ref 3.5–5)
PROT SERPL-MCNC: 6.8 G/DL — SIGNIFICANT CHANGE UP (ref 6–8)
PROTHROM AB SERPL-ACNC: 10 SEC — SIGNIFICANT CHANGE UP (ref 9.95–12.87)
RBC # BLD: 5 M/UL — SIGNIFICANT CHANGE UP (ref 4.7–6.1)
RBC # FLD: 13.6 % — SIGNIFICANT CHANGE UP (ref 11.5–14.5)
RSV RNA NPH QL NAA+NON-PROBE: SIGNIFICANT CHANGE UP
SARS-COV-2 RNA SPEC QL NAA+PROBE: SIGNIFICANT CHANGE UP
SODIUM SERPL-SCNC: 140 MMOL/L — SIGNIFICANT CHANGE UP (ref 135–146)
TROPONIN T SERPL-MCNC: <0.01 NG/ML — SIGNIFICANT CHANGE UP
WBC # BLD: 15.64 K/UL — HIGH (ref 4.8–10.8)
WBC # FLD AUTO: 15.64 K/UL — HIGH (ref 4.8–10.8)

## 2023-01-22 PROCEDURE — 93010 ELECTROCARDIOGRAM REPORT: CPT

## 2023-01-22 PROCEDURE — 70450 CT HEAD/BRAIN W/O DYE: CPT | Mod: 26,MA

## 2023-01-22 PROCEDURE — 71045 X-RAY EXAM CHEST 1 VIEW: CPT | Mod: 26

## 2023-01-22 PROCEDURE — 99285 EMERGENCY DEPT VISIT HI MDM: CPT | Mod: FS

## 2023-01-22 RX ORDER — ONDANSETRON 8 MG/1
4 TABLET, FILM COATED ORAL ONCE
Refills: 0 | Status: COMPLETED | OUTPATIENT
Start: 2023-01-22 | End: 2023-01-22

## 2023-01-22 RX ORDER — MECLIZINE HCL 12.5 MG
25 TABLET ORAL ONCE
Refills: 0 | Status: COMPLETED | OUTPATIENT
Start: 2023-01-22 | End: 2023-01-22

## 2023-01-22 RX ORDER — SODIUM CHLORIDE 9 MG/ML
1000 INJECTION INTRAMUSCULAR; INTRAVENOUS; SUBCUTANEOUS ONCE
Refills: 0 | Status: DISCONTINUED | OUTPATIENT
Start: 2023-01-22 | End: 2023-01-22

## 2023-01-22 RX ORDER — ACETAMINOPHEN 500 MG
975 TABLET ORAL ONCE
Refills: 0 | Status: COMPLETED | OUTPATIENT
Start: 2023-01-22 | End: 2023-01-22

## 2023-01-22 RX ORDER — ONDANSETRON 8 MG/1
1 TABLET, FILM COATED ORAL
Qty: 6 | Refills: 0
Start: 2023-01-22 | End: 2023-01-23

## 2023-01-22 RX ADMIN — Medication 975 MILLIGRAM(S): at 18:23

## 2023-01-22 RX ADMIN — ONDANSETRON 4 MILLIGRAM(S): 8 TABLET, FILM COATED ORAL at 18:23

## 2023-01-22 RX ADMIN — Medication 25 MILLIGRAM(S): at 18:23

## 2023-01-22 NOTE — ED PROVIDER NOTE - PHYSICAL EXAMINATION
CONST: Well appearing adult male in NAD  EYES: PERRL, EOMI, Sclera and conjunctiva clear.   ENT: No nasal discharge. Oropharynx normal appearing, no erythema or exudates. Uvula midline.  NECK: Non-tender, no meningeal signs  CARD: Normal S1 S2; Normal rate and rhythm  RESP: Equal BS B/L, No wheezes, rhonchi or rales. No distress  GI: Soft, non-tender, non-distended.  MS: Normal ROM in all extremities. No midline spinal tenderness.  NEURO: A&Ox3, No focal deficits.

## 2023-01-22 NOTE — ED PROVIDER NOTE - OBJECTIVE STATEMENT
62-year-old male with a past medical history of hypertension, hyperlipidemia, COPD, hypothyroidism, osteoarthritis presents emergency department complaining of chest discomfort.  Patient states that chest comfort came on acutely was localized to the substernal, nonradiating, nonexertional.  No palliating or provoking factors.  Associated nausea with multiple episodes of vomiting, and diarrhea.  Does not currently follow with cardiology.  Patient states that a "similar thing happened my father years ago and he passed in front of me ", prompting ED evaluation.  Denies abdominal pain, hemoptysis, cough, fever, vision changes, dysuria, hematuria, recent travel, sick contacts. 62-year-old male with a past medical history of hypertension, hyperlipidemia, COPD, hypothyroidism, osteoarthritis presents emergency department complaining of chest discomfort.  Patient states that chest comfort came on acutely was localized to the substernal, nonradiating, nonexertional.  No palliating or provoking factors.  Associated nausea with multiple episodes of vomiting, diarrhea, and dizziness. Patient states that a "similar thing happened my father years ago and he passed in front of me ", prompting ED evaluation.  Denies abdominal pain, hemoptysis, cough, fever, vision changes, dysuria, hematuria, recent travel, sick contacts, weakness. 62-year-old male with a past medical history of hypertension, hyperlipidemia, COPD, hypothyroidism, osteoarthritis presents emergency department complaining of chest discomfort.  Patient states that chest comfort came on acutely was localized to the substernal, non-radiating, nonexertional.  No palliating or provoking factors.  Associated nausea with multiple episodes of vomiting, diarrhea, and dizziness. Patient states that a "similar thing happened my father years ago and he passed in front of me ", prompting ED evaluation.  Denies abdominal pain, hemoptysis, cough, fever, vision changes, dysuria, hematuria, recent travel, sick contacts, weakness.

## 2023-01-22 NOTE — ED PROVIDER NOTE - ATTENDING APP SHARED VISIT CONTRIBUTION OF CARE
63 yo M pmh of HTN, hypothyroid, presents with n/v/d. Patient states that around 2pm he started to feel very nasous followed by episodes of vomiting and diarrhea. Reports feeling dizziness at that time. Also had some chest pain while vomiting which has since resolved. + chills. no fevers. + cough non productive. no congestion or runny nose. no urinary symptoms. no sick contacts. no leg swelling or pain.     CONSTITUTIONAL: Well-developed; well-nourished; in no acute distress.   SKIN: dry. + tactile fever.   HEAD: Normocephalic; atraumatic.  EYES: PERRL, EOMI, no conjunctival erythema  ENT: No nasal discharge; airway clear.  NECK: Supple; non tender.  CARD: S1, S2 normal;  Regular rate and rhythm.   RESP: No wheezes, rales or rhonchi.  ABD: soft non tender, non distended, no rebound or guarding, no cva tenderness.   EXT: Normal ROM.  5/5 strength in all 4 extremities   LYMPH: No acute cervical adenopathy.  NEURO: Alert, oriented, grossly unremarkable. neurovascularly intact  PSYCH: Cooperative, appropriate.

## 2023-01-22 NOTE — ED PROVIDER NOTE - CLINICAL SUMMARY MEDICAL DECISION MAKING FREE TEXT BOX
Patient presents with n/v/d, chills and cough. Viral swab sent. + tactile fever on exam. Patient reported dizziness and chest pain while having vomiting episodes which have since resolved. labs, ekg, cxr, ct done. no acute findings. Patient feeling better. tolerating po. Discharged with pmd follow up and return precautions.

## 2023-01-22 NOTE — ED PROVIDER NOTE - NS ED ROS FT
Review of Systems:  	•	CONSTITUTIONAL - no fever, no diaphoresis, + chills  	•	SKIN - no rash  	•	HEMATOLOGIC - no bleeding, no bruising  	•	EYES - no eye pain, no blurry vision  	•	ENT - no congestion  	•	RESPIRATORY - no shortness of breath, no cough  	•	CARDIAC - + chest pain, no palpitations  	•	GI - no abd pain, + nausea, + vomiting, no diarrhea, no constipation  	•	GENITO-URINARY - no dysuria; no hematuria, no increased urinary frequency  	•	MUSCULOSKELETAL - no joint paint, no swelling, no redness  	•	NEUROLOGIC - no weakness, no headache, no paresthesias, no LOC

## 2023-01-22 NOTE — ED PROVIDER NOTE - NSICDXPASTMEDICALHX_GEN_ALL_CORE_FT
PAST MEDICAL HISTORY:  Anxiety     Back pain     COPD, mild     Emphysema/COPD "mild" per pt    Gallstones     Goiter     HLD (hyperlipidemia)     HTN (hypertension)     Hypothyroidism     OA (osteoarthritis)

## 2023-01-22 NOTE — ED PROVIDER NOTE - PATIENT PORTAL LINK FT
You can access the FollowMyHealth Patient Portal offered by [facility] by registering at the following website: [email or web address]. By joining [facility]’s FollowMyHealth portal, you will also be able to view your health information using other applications (apps) compatible with our system.

## 2023-01-22 NOTE — ED PROVIDER NOTE - NSFOLLOWUPINSTRUCTIONS_ED_ALL_ED_FT
Please follow up with your primary care physician as soon as possible.     Nausea / Vomiting    Nausea is the feeling that you have to vomit. As nausea gets worse, it can lead to vomiting. Vomiting puts you at an increased risk for dehydration. Older adults and people with other diseases or a weak immune system are at higher risk for dehydration. Drink clear fluids in small but frequent amounts as tolerated. Eat bland, easy-to-digest foods in small amounts as tolerated.    SEEK IMMEDIATE MEDICAL CARE IF YOU HAVE ANY OF THE FOLLOWING SYMPTOMS: fever, inability to keep sufficient fluids down, black or bloody vomitus, black or bloody stools, lightheadedness/dizziness, chest pain, severe headache, rash, shortness of breath, cold or clammy skin, confusion, pain with urination, or any signs of dehydration.

## 2023-01-30 ENCOUNTER — OUTPATIENT (OUTPATIENT)
Dept: OUTPATIENT SERVICES | Facility: HOSPITAL | Age: 63
LOS: 1 days | Discharge: HOME | End: 2023-01-30

## 2023-01-30 DIAGNOSIS — Z98.890 OTHER SPECIFIED POSTPROCEDURAL STATES: Chronic | ICD-10-CM

## 2023-01-30 DIAGNOSIS — Z96.643 PRESENCE OF ARTIFICIAL HIP JOINT, BILATERAL: Chronic | ICD-10-CM

## 2023-01-31 ENCOUNTER — APPOINTMENT (OUTPATIENT)
Dept: PODIATRY | Facility: CLINIC | Age: 63
End: 2023-01-31
Payer: MEDICARE

## 2023-01-31 ENCOUNTER — OUTPATIENT (OUTPATIENT)
Dept: OUTPATIENT SERVICES | Facility: HOSPITAL | Age: 63
LOS: 1 days | Discharge: HOME | End: 2023-01-31

## 2023-01-31 DIAGNOSIS — Z98.890 OTHER SPECIFIED POSTPROCEDURAL STATES: Chronic | ICD-10-CM

## 2023-01-31 DIAGNOSIS — Z96.643 PRESENCE OF ARTIFICIAL HIP JOINT, BILATERAL: Chronic | ICD-10-CM

## 2023-01-31 PROCEDURE — 20605 DRAIN/INJ JOINT/BURSA W/O US: CPT

## 2023-01-31 PROCEDURE — 99213 OFFICE O/P EST LOW 20 MIN: CPT | Mod: 25

## 2023-02-01 ENCOUNTER — OUTPATIENT (OUTPATIENT)
Dept: OUTPATIENT SERVICES | Facility: HOSPITAL | Age: 63
LOS: 1 days | Discharge: HOME | End: 2023-02-01

## 2023-02-01 DIAGNOSIS — Z96.643 PRESENCE OF ARTIFICIAL HIP JOINT, BILATERAL: Chronic | ICD-10-CM

## 2023-02-01 DIAGNOSIS — Z98.890 OTHER SPECIFIED POSTPROCEDURAL STATES: Chronic | ICD-10-CM

## 2023-02-01 DIAGNOSIS — K02.63 DENTAL CARIES ON SMOOTH SURFACE PENETRATING INTO PULP: ICD-10-CM

## 2023-02-07 ENCOUNTER — OUTPATIENT (OUTPATIENT)
Dept: OUTPATIENT SERVICES | Facility: HOSPITAL | Age: 63
LOS: 1 days | End: 2023-02-07
Payer: MEDICAID

## 2023-02-07 DIAGNOSIS — Z98.890 OTHER SPECIFIED POSTPROCEDURAL STATES: Chronic | ICD-10-CM

## 2023-02-07 DIAGNOSIS — Z96.643 PRESENCE OF ARTIFICIAL HIP JOINT, BILATERAL: Chronic | ICD-10-CM

## 2023-02-07 DIAGNOSIS — K02.7 DENTAL ROOT CARIES: ICD-10-CM

## 2023-02-07 PROCEDURE — D7140: CPT

## 2023-02-08 DIAGNOSIS — K02.7 DENTAL ROOT CARIES: ICD-10-CM

## 2023-02-25 NOTE — PROCEDURE
[Right Foot] : was performed on the right foot [Therapeutic] : therapeutic [Consent Obtained] : Written consent was obtained prior to the procedure and is detailed in the patient's record [Patient] : Prior to the start of the procedure a time out was taken and the identity of the patient was confirmed via name and date of birth with the patient. The correct site and the procedure to be performed were confirmed. The correct side was confirmed if applicable. The availability of the correct equipment was verified [Ethyl Chloride] : ethyl chloride [___ ml Inj] : [unfilled] ~Uml [1%] : 1%  [Kenalog 10] : Kenalog 10 [Betamethasone] : Betamethasone [Tolerated Well] : tolerated the procedure well [Reports Improvement in Symptoms] : reports improvement in symptoms [No Complications] : There were no complications. [Instructions Given] : given handouts/patient instructions [Patient Instructed to Call] : instructed to call if redness at site, a decrease in range of motion or an increase in pain is noted after procedure. [de-identified] : 0.5 cc each

## 2023-02-25 NOTE — PHYSICAL EXAM
[General Appearance - Alert] : alert [General Appearance - Well Nourished] : well nourished [General Appearance - In No Acute Distress] : in no acute distress [General Appearance - Well Developed] : well developed [Sensation] : the sensory exam was normal to light touch and pinprick [No Focal Deficits] : no focal deficits [de-identified] : R dorsum midfoot joint pain on palpation  [FreeTextEntry1] : No abnormality was found on R foot skin

## 2023-02-25 NOTE — ASSESSMENT
[FreeTextEntry1] : Assessment: R foot dorsum joint pain, arthritis of foot\par \par Plan:\par Pt seen and eval\par Injection of 1.5cc 1% lido and 1.5cc 1% Kenalog 10 \par Instructed to continue monitoring left foot symptoms from sciatica flare up\par F/u in 1 month

## 2023-02-28 ENCOUNTER — APPOINTMENT (OUTPATIENT)
Dept: PODIATRY | Facility: CLINIC | Age: 63
End: 2023-02-28

## 2023-03-06 ENCOUNTER — OUTPATIENT (OUTPATIENT)
Dept: OUTPATIENT SERVICES | Facility: HOSPITAL | Age: 63
LOS: 1 days | End: 2023-03-06
Payer: MEDICARE

## 2023-03-06 ENCOUNTER — APPOINTMENT (OUTPATIENT)
Dept: PODIATRY | Facility: CLINIC | Age: 63
End: 2023-03-06
Payer: MEDICARE

## 2023-03-06 DIAGNOSIS — Z96.643 PRESENCE OF ARTIFICIAL HIP JOINT, BILATERAL: Chronic | ICD-10-CM

## 2023-03-06 DIAGNOSIS — Z98.890 OTHER SPECIFIED POSTPROCEDURAL STATES: Chronic | ICD-10-CM

## 2023-03-06 DIAGNOSIS — Z00.00 ENCOUNTER FOR GENERAL ADULT MEDICAL EXAMINATION WITHOUT ABNORMAL FINDINGS: ICD-10-CM

## 2023-03-06 PROCEDURE — 99214 OFFICE O/P EST MOD 30 MIN: CPT | Mod: 25,95

## 2023-03-06 PROCEDURE — 20605 DRAIN/INJ JOINT/BURSA W/O US: CPT | Mod: RT

## 2023-03-06 PROCEDURE — 99214 OFFICE O/P EST MOD 30 MIN: CPT | Mod: 25

## 2023-03-06 RX ORDER — VARENICLINE 0.5 MG/1
0.5 TABLET, FILM COATED ORAL
Qty: 60 | Refills: 0 | Status: DISCONTINUED | COMMUNITY
Start: 2022-04-14 | End: 2023-03-06

## 2023-03-06 RX ORDER — ZOSTER VACCINE RECOMBINANT, ADJUVANTED 50 MCG/0.5
50 KIT INTRAMUSCULAR
Qty: 1 | Refills: 0 | Status: DISCONTINUED | COMMUNITY
Start: 2021-10-14 | End: 2023-03-06

## 2023-03-06 RX ORDER — OXYMETAZOLINE HYDROCHLORIDE 0.05 G/100ML
0.05 SPRAY NASAL TWICE DAILY
Qty: 1 | Refills: 3 | Status: DISCONTINUED | COMMUNITY
Start: 2021-04-08 | End: 2023-03-06

## 2023-03-07 NOTE — PROCEDURE
[Right Foot] : was performed on the right foot [Therapeutic] : therapeutic [Consent Obtained] : Written consent was obtained prior to the procedure and is detailed in the patient's record [Patient] : Prior to the start of the procedure a time out was taken and the identity of the patient was confirmed via name and date of birth with the patient. The correct site and the procedure to be performed were confirmed. The correct side was confirmed if applicable. The availability of the correct equipment was verified [Ethyl Chloride] : ethyl chloride [___ ml Inj] : [unfilled] ~Uml [1%] : 1%  [Kenalog 10] : Kenalog 10 [Betamethasone] : Betamethasone [Tolerated Well] : tolerated the procedure well [Reports Improvement in Symptoms] : reports improvement in symptoms [No Complications] : There were no complications. [Instructions Given] : given handouts/patient instructions [Patient Instructed to Call] : instructed to call if redness at site, a decrease in range of motion or an increase in pain is noted after procedure. [de-identified] : 0.5 cc each

## 2023-03-07 NOTE — ASSESSMENT
[FreeTextEntry1] : Assessment: R foot dorsum joint pain, arthritis of foot\par \par Plan:\par Pt seen and eval\par Injection of 1.5cc 1% lido and 1.5cc 1% Kenalog 10 \par Instructed to continue monitoring left foot symptoms from sciatica flare up\par Will discuss possibliity of surgical shaving of the arthitic bump on the right foot during next appointment. \par F/u in 1 month

## 2023-03-07 NOTE — PHYSICAL EXAM
[General Appearance - Alert] : alert [General Appearance - In No Acute Distress] : in no acute distress [General Appearance - Well Nourished] : well nourished [General Appearance - Well Developed] : well developed [Sensation] : the sensory exam was normal to light touch and pinprick [No Focal Deficits] : no focal deficits [de-identified] : R dorsum midfoot joint pain on palpation  [FreeTextEntry1] : No abnormality was found on R foot skin

## 2023-03-09 DIAGNOSIS — M19.079 PRIMARY OSTEOARTHRITIS, UNSPECIFIED ANKLE AND FOOT: ICD-10-CM

## 2023-03-09 DIAGNOSIS — M25.572 PAIN IN LEFT ANKLE AND JOINTS OF LEFT FOOT: ICD-10-CM

## 2023-03-09 DIAGNOSIS — M79.672 PAIN IN LEFT FOOT: ICD-10-CM

## 2023-03-09 DIAGNOSIS — M79.671 PAIN IN RIGHT FOOT: ICD-10-CM

## 2023-03-09 DIAGNOSIS — M54.32 SCIATICA, LEFT SIDE: ICD-10-CM

## 2023-03-09 DIAGNOSIS — M54.30 SCIATICA, UNSPECIFIED SIDE: ICD-10-CM

## 2023-03-20 NOTE — REASON FOR VISIT
[Foot Pain] : foot pain [Follow-Up Visit] : a follow-up visit for [FreeTextEntry2] : Right dorsum midfoot pain Willet

## 2023-04-11 ENCOUNTER — APPOINTMENT (OUTPATIENT)
Dept: PODIATRY | Facility: CLINIC | Age: 63
End: 2023-04-11

## 2023-04-17 ENCOUNTER — APPOINTMENT (OUTPATIENT)
Dept: INTERNAL MEDICINE | Facility: CLINIC | Age: 63
End: 2023-04-17

## 2023-04-19 ENCOUNTER — APPOINTMENT (OUTPATIENT)
Dept: NEUROLOGY | Facility: CLINIC | Age: 63
End: 2023-04-19

## 2023-04-19 LAB
ALBUMIN SERPL ELPH-MCNC: 4.2 G/DL
ALP BLD-CCNC: 81 U/L
ALT SERPL-CCNC: 16 U/L
ANION GAP SERPL CALC-SCNC: 9 MMOL/L
AST SERPL-CCNC: 19 U/L
BASOPHILS # BLD AUTO: 0.05 K/UL
BASOPHILS NFR BLD AUTO: 0.5 %
BILIRUB SERPL-MCNC: 0.3 MG/DL
BUN SERPL-MCNC: 10 MG/DL
CALCIUM SERPL-MCNC: 9.6 MG/DL
CHLORIDE SERPL-SCNC: 104 MMOL/L
CO2 SERPL-SCNC: 28 MMOL/L
CREAT SERPL-MCNC: 0.9 MG/DL
EGFR: 97 ML/MIN/1.73M2
EOSINOPHIL # BLD AUTO: 0.27 K/UL
EOSINOPHIL NFR BLD AUTO: 2.7 %
ESTIMATED AVERAGE GLUCOSE: 117 MG/DL
GLUCOSE SERPL-MCNC: 105 MG/DL
HBA1C MFR BLD HPLC: 5.7 %
HCT VFR BLD CALC: 50.7 %
HGB BLD-MCNC: 17.1 G/DL
IMM GRANULOCYTES NFR BLD AUTO: 0.6 %
LYMPHOCYTES # BLD AUTO: 3.72 K/UL
LYMPHOCYTES NFR BLD AUTO: 37.1 %
MAN DIFF?: NORMAL
MCHC RBC-ENTMCNC: 33.1 PG
MCHC RBC-ENTMCNC: 33.7 G/DL
MCV RBC AUTO: 98.3 FL
MONOCYTES # BLD AUTO: 0.68 K/UL
MONOCYTES NFR BLD AUTO: 6.8 %
NEUTROPHILS # BLD AUTO: 5.26 K/UL
NEUTROPHILS NFR BLD AUTO: 52.3 %
PLATELET # BLD AUTO: 189 K/UL
POTASSIUM SERPL-SCNC: 4.9 MMOL/L
PROT SERPL-MCNC: 6.5 G/DL
RBC # BLD: 5.16 M/UL
RBC # FLD: 14 %
SODIUM SERPL-SCNC: 141 MMOL/L
WBC # FLD AUTO: 10.04 K/UL

## 2023-05-22 ENCOUNTER — APPOINTMENT (OUTPATIENT)
Dept: INTERNAL MEDICINE | Facility: CLINIC | Age: 63
End: 2023-05-22
Payer: MEDICARE

## 2023-05-22 ENCOUNTER — NON-APPOINTMENT (OUTPATIENT)
Age: 63
End: 2023-05-22

## 2023-05-22 ENCOUNTER — OUTPATIENT (OUTPATIENT)
Dept: OUTPATIENT SERVICES | Facility: HOSPITAL | Age: 63
LOS: 1 days | End: 2023-05-22
Payer: MEDICARE

## 2023-05-22 VITALS
HEIGHT: 72 IN | TEMPERATURE: 97.8 F | BODY MASS INDEX: 27.22 KG/M2 | WEIGHT: 201 LBS | DIASTOLIC BLOOD PRESSURE: 83 MMHG | SYSTOLIC BLOOD PRESSURE: 127 MMHG | HEART RATE: 58 BPM | OXYGEN SATURATION: 98 %

## 2023-05-22 DIAGNOSIS — Z96.643 PRESENCE OF ARTIFICIAL HIP JOINT, BILATERAL: Chronic | ICD-10-CM

## 2023-05-22 DIAGNOSIS — Z00.00 ENCOUNTER FOR GENERAL ADULT MEDICAL EXAMINATION WITHOUT ABNORMAL FINDINGS: ICD-10-CM

## 2023-05-22 DIAGNOSIS — Z98.890 OTHER SPECIFIED POSTPROCEDURAL STATES: Chronic | ICD-10-CM

## 2023-05-22 PROCEDURE — 99214 OFFICE O/P EST MOD 30 MIN: CPT

## 2023-05-22 PROCEDURE — 99214 OFFICE O/P EST MOD 30 MIN: CPT | Mod: GC

## 2023-05-22 RX ORDER — HYDROCORTISONE 10 MG/G
1 CREAM TOPICAL
Qty: 1 | Refills: 0 | Status: DISCONTINUED | COMMUNITY
Start: 2019-11-19 | End: 2023-05-22

## 2023-05-22 RX ORDER — DICLOFENAC SODIUM 1% 10 MG/G
1 GEL TOPICAL DAILY
Qty: 1 | Refills: 3 | Status: ACTIVE | COMMUNITY
Start: 2023-04-19 | End: 1900-01-01

## 2023-05-22 NOTE — PHYSICAL EXAM
[No Acute Distress] : no acute distress [Well Nourished] : well nourished [Well Developed] : well developed [Well-Appearing] : well-appearing [No Respiratory Distress] : no respiratory distress  [No Accessory Muscle Use] : no accessory muscle use [Clear to Auscultation] : lungs were clear to auscultation bilaterally [Normal Rate] : normal rate  [Regular Rhythm] : with a regular rhythm [Normal S1, S2] : normal S1 and S2 [No Murmur] : no murmur heard [Soft] : abdomen soft [Non Tender] : non-tender [Non-distended] : non-distended [No Masses] : no abdominal mass palpated [Normal Bowel Sounds] : normal bowel sounds

## 2023-05-24 NOTE — ASSESSMENT
[FreeTextEntry1] : 61 y/o M with pmh of HTN, DLD, pulmonary nodules, thyroid mass s/p partial hemithyroidectomy, osteoarthritis and and CAD presented for follow up\par \par # Sciatica\par - Hold chiropractor (insurance won't cover both)\par - Cyclobenzaprine rx sent \par - Ambulates independently\par - PT referral \par \par # LLE muscle atrophy\par - As per pt due to slight leg discrepancy post b/l hip sx and sciatica\par - PT\par \par #Lactose intolerance\par #Reflux esophagitis\par - stool infectious, ibd and malabsorptive workup unremarkable\par - EGD 8/21: irregular z line suspicious for barrettes but bx showed eosinophils only consistent w/ reflux esophagitis and no intestinal metaplasia or dysplasia. duodenitis and no h.pylori\par - CF 8/21: inadquate prep - random bx non revealing for microscopic colitis. 6 polyps removed w/ largest TA was 12mm in AC. Pt to have repeat colonoscopy d/t inadequate prep\par - Colonoscopy Feb 2022 - diverticula seen \par - advised to avoid lactose products \par \par # Thyroid mass s/p hemithyroidectomy, pathology positive for hyperplasia/no tumor seen\par - TSH 4.45 -> 0.87\par - c/w levothyroxine\par - Has appt w dr Yanez in june \par \par # lung nodules\par - CT chest yearly ; Sep 2022 ; no new or growig nodes \par - Pt denies smoking. However pt records he smokes \par \par # CAD\par # HTN\par - c/w norvasc, aspirin\par \par # DLD\par - c/w statin\par \par # Pre DM\par - A1c 5.9%\par - Diet and wt loss advised \par \par # HCM\par - Last colonoscopy done in Feb. \par - Tdap 2015, updated on flu, coved x2\par - pneumococcal vaccine at 65\par \par RTC 3 months \par F/u new labs \par

## 2023-05-24 NOTE — HISTORY OF PRESENT ILLNESS
[FreeTextEntry1] : L shoulder pain  [de-identified] : 61 y/o M PMHx HTN, DLD, pulmonary nodules, thyroid mass s/p partial hemithyroidectomy, osteoarthritis and chronic low back pain/sciatica, L foot drop, s/p b/l hip replacement 2010 with residual L left length discrepancy, R foot pain s/p lido injection.\par \par Last seen 10/22 for Persistent sciatica flares and R Shoulder pain. \par R shoulder pain resolved. Follows with chiropractor for sciatica. Pt c/o muscle atrophy on LLE since hip sx. needs new muscle relaxer because medicaid no longer cover methocarbamol \par

## 2023-05-24 NOTE — REVIEW OF SYSTEMS
[Muscle Weakness] : muscle weakness [Back Pain] : back pain [Fever] : no fever [Chills] : no chills [Fatigue] : no fatigue [Chest Pain] : no chest pain [Palpitations] : no palpitations [Lower Ext Edema] : no lower extremity edema [Paroxysmal Nocturnal Dyspnea] : no paroxysmal nocturnal dyspnea [Shortness Of Breath] : no shortness of breath [Wheezing] : no wheezing [Cough] : no cough [Dyspnea on Exertion] : not dyspnea on exertion [Abdominal Pain] : no abdominal pain [Nausea] : no nausea [Diarrhea] : no diarrhea [Heartburn] : no heartburn [Hematuria] : no hematuria [Joint Pain] : no joint pain [Muscle Pain] : no muscle pain [Skin Rash] : no skin rash [Headache] : no headache

## 2023-05-25 DIAGNOSIS — I10 ESSENTIAL (PRIMARY) HYPERTENSION: ICD-10-CM

## 2023-05-25 DIAGNOSIS — M54.30 SCIATICA, UNSPECIFIED SIDE: ICD-10-CM

## 2023-05-25 DIAGNOSIS — M54.50 LOW BACK PAIN, UNSPECIFIED: ICD-10-CM

## 2023-05-25 DIAGNOSIS — E78.5 HYPERLIPIDEMIA, UNSPECIFIED: ICD-10-CM

## 2023-06-14 ENCOUNTER — OUTPATIENT (OUTPATIENT)
Dept: OUTPATIENT SERVICES | Facility: HOSPITAL | Age: 63
LOS: 1 days | End: 2023-06-14
Payer: MEDICARE

## 2023-06-14 DIAGNOSIS — M54.16 RADICULOPATHY, LUMBAR REGION: ICD-10-CM

## 2023-06-14 DIAGNOSIS — M54.32 SCIATICA, LEFT SIDE: ICD-10-CM

## 2023-06-14 DIAGNOSIS — Z98.890 OTHER SPECIFIED POSTPROCEDURAL STATES: Chronic | ICD-10-CM

## 2023-06-14 DIAGNOSIS — Z96.643 PRESENCE OF ARTIFICIAL HIP JOINT, BILATERAL: Chronic | ICD-10-CM

## 2023-06-14 PROCEDURE — 97161 PT EVAL LOW COMPLEX 20 MIN: CPT | Mod: GP

## 2023-06-14 PROCEDURE — 97110 THERAPEUTIC EXERCISES: CPT | Mod: GP

## 2023-06-15 DIAGNOSIS — M54.32 SCIATICA, LEFT SIDE: ICD-10-CM

## 2023-06-15 DIAGNOSIS — M54.16 RADICULOPATHY, LUMBAR REGION: ICD-10-CM

## 2023-06-28 ENCOUNTER — OUTPATIENT (OUTPATIENT)
Dept: OUTPATIENT SERVICES | Facility: HOSPITAL | Age: 63
LOS: 1 days | End: 2023-06-28

## 2023-06-28 DIAGNOSIS — M54.32 SCIATICA, LEFT SIDE: ICD-10-CM

## 2023-06-28 DIAGNOSIS — M54.16 RADICULOPATHY, LUMBAR REGION: ICD-10-CM

## 2023-06-28 DIAGNOSIS — Z96.643 PRESENCE OF ARTIFICIAL HIP JOINT, BILATERAL: Chronic | ICD-10-CM

## 2023-06-28 DIAGNOSIS — Z98.890 OTHER SPECIFIED POSTPROCEDURAL STATES: Chronic | ICD-10-CM

## 2023-07-03 ENCOUNTER — OUTPATIENT (OUTPATIENT)
Dept: OUTPATIENT SERVICES | Facility: HOSPITAL | Age: 63
LOS: 1 days | End: 2023-07-03
Payer: MEDICARE

## 2023-07-03 DIAGNOSIS — M54.16 RADICULOPATHY, LUMBAR REGION: ICD-10-CM

## 2023-07-03 DIAGNOSIS — Z96.643 PRESENCE OF ARTIFICIAL HIP JOINT, BILATERAL: Chronic | ICD-10-CM

## 2023-07-03 DIAGNOSIS — Z98.890 OTHER SPECIFIED POSTPROCEDURAL STATES: Chronic | ICD-10-CM

## 2023-07-03 DIAGNOSIS — M54.32 SCIATICA, LEFT SIDE: ICD-10-CM

## 2023-07-03 PROCEDURE — 97110 THERAPEUTIC EXERCISES: CPT | Mod: GP

## 2023-07-04 DIAGNOSIS — M54.32 SCIATICA, LEFT SIDE: ICD-10-CM

## 2023-07-04 DIAGNOSIS — M54.16 RADICULOPATHY, LUMBAR REGION: ICD-10-CM

## 2023-08-08 ENCOUNTER — OUTPATIENT (OUTPATIENT)
Dept: OUTPATIENT SERVICES | Facility: HOSPITAL | Age: 63
LOS: 1 days | End: 2023-08-08
Payer: MEDICARE

## 2023-08-08 DIAGNOSIS — Z96.643 PRESENCE OF ARTIFICIAL HIP JOINT, BILATERAL: Chronic | ICD-10-CM

## 2023-08-08 DIAGNOSIS — Z98.890 OTHER SPECIFIED POSTPROCEDURAL STATES: Chronic | ICD-10-CM

## 2023-08-08 PROCEDURE — 85027 COMPLETE CBC AUTOMATED: CPT

## 2023-08-08 PROCEDURE — 84443 ASSAY THYROID STIM HORMONE: CPT

## 2023-08-08 PROCEDURE — 80061 LIPID PANEL: CPT

## 2023-08-08 PROCEDURE — 83036 HEMOGLOBIN GLYCOSYLATED A1C: CPT

## 2023-08-08 PROCEDURE — 80053 COMPREHEN METABOLIC PANEL: CPT

## 2023-08-09 LAB
ALBUMIN SERPL ELPH-MCNC: 4.3 G/DL
ALP BLD-CCNC: 76 U/L
ALT SERPL-CCNC: 21 U/L
ANION GAP SERPL CALC-SCNC: 11 MMOL/L
AST SERPL-CCNC: 23 U/L
BILIRUB SERPL-MCNC: 0.5 MG/DL
BUN SERPL-MCNC: 12 MG/DL
CALCIUM SERPL-MCNC: 9.3 MG/DL
CHLORIDE SERPL-SCNC: 104 MMOL/L
CHOLEST SERPL-MCNC: 142 MG/DL
CO2 SERPL-SCNC: 27 MMOL/L
CREAT SERPL-MCNC: 1 MG/DL
EGFR: 85 ML/MIN/1.73M2
ESTIMATED AVERAGE GLUCOSE: 114 MG/DL
GLUCOSE SERPL-MCNC: 109 MG/DL
HBA1C MFR BLD HPLC: 5.6 %
HDLC SERPL-MCNC: 56 MG/DL
LDLC SERPL CALC-MCNC: 71 MG/DL
NONHDLC SERPL-MCNC: 86 MG/DL
POTASSIUM SERPL-SCNC: 5.2 MMOL/L
PROT SERPL-MCNC: 6.8 G/DL
SODIUM SERPL-SCNC: 142 MMOL/L
TRIGL SERPL-MCNC: 77 MG/DL
TSH SERPL-ACNC: 3.69 UIU/ML

## 2023-08-16 ENCOUNTER — APPOINTMENT (OUTPATIENT)
Dept: INTERNAL MEDICINE | Facility: CLINIC | Age: 63
End: 2023-08-16
Payer: MEDICARE

## 2023-08-16 ENCOUNTER — OUTPATIENT (OUTPATIENT)
Dept: OUTPATIENT SERVICES | Facility: HOSPITAL | Age: 63
LOS: 1 days | End: 2023-08-16
Payer: MEDICARE

## 2023-08-16 VITALS
SYSTOLIC BLOOD PRESSURE: 144 MMHG | BODY MASS INDEX: 26.95 KG/M2 | HEART RATE: 58 BPM | HEIGHT: 72 IN | TEMPERATURE: 98.3 F | DIASTOLIC BLOOD PRESSURE: 88 MMHG | OXYGEN SATURATION: 98 % | WEIGHT: 199 LBS

## 2023-08-16 DIAGNOSIS — Z98.890 OTHER SPECIFIED POSTPROCEDURAL STATES: Chronic | ICD-10-CM

## 2023-08-16 DIAGNOSIS — R73.03 PREDIABETES.: ICD-10-CM

## 2023-08-16 DIAGNOSIS — R91.8 OTHER NONSPECIFIC ABNORMAL FINDING OF LUNG FIELD: ICD-10-CM

## 2023-08-16 DIAGNOSIS — Z00.00 ENCOUNTER FOR GENERAL ADULT MEDICAL EXAMINATION WITHOUT ABNORMAL FINDINGS: ICD-10-CM

## 2023-08-16 DIAGNOSIS — Z96.643 PRESENCE OF ARTIFICIAL HIP JOINT, BILATERAL: Chronic | ICD-10-CM

## 2023-08-16 PROCEDURE — 99214 OFFICE O/P EST MOD 30 MIN: CPT | Mod: GC

## 2023-08-16 PROCEDURE — 99214 OFFICE O/P EST MOD 30 MIN: CPT

## 2023-08-16 NOTE — HISTORY OF PRESENT ILLNESS
[FreeTextEntry1] : 3 month f/u labs [de-identified] : 64 y/o M PMHx HTN, DLD, pulmonary nodules, thyroid mass s/p partial hemithyroidectomy, osteoarthritis and chronic low back pain/sciatica, L foot drop, s/p b/l hip replacement 2010 with residual L left length discrepancy, R foot pain s/p lido injection.  Patient is here today with no acute complaints to review 3 month labs.

## 2023-08-16 NOTE — ASSESSMENT
[FreeTextEntry1] : # Sciatica - Hold chiropractor (insurance won't cover both) - Cyclobenzaprine rx sent - Ambulates independently - PT referral sent last visit in May - Went to 5-6 visits, began doing exercises at home - Pain is controlled  # LLE muscle atrophy - As per pt due to slight leg discrepancy post b/l hip sx and sciatica - PT referral sent last visit in May - Attended 5-6 visits, does exercises at home now  # Thyroid mass s/p hemithyroidectomy, pathology positive for hyperplasia/no tumor seen - Follows w/ Dr. Yanez, last appt in June no changes to medications were made - TSH 0.87 (May 2023) -> 3.69 (August 2023) - C/w Levothyroxine  # Lung nodules - CT chest yearly ; Sep 2022 showed no new or growing nodes - Pt denies smoking. However pt records he smokes - CT Chest ordered today  # CAD # HTN - Blood pressure readings at home 120-125/80 - C/w Amlodipine 5mg, aspirin 81mg  # DLD - C/w rosuvastatin 10mg  # Pre DM - A1c currently 5.6%   - Diet and weight loss advised  #Lactose intolerance #Reflux esophagitis - stool infectious, ibd and malabsorptive workup unremarkable - EGD 8/21: irregular z line suspicious for barrettes but bx showed eosinophils only consistent w/ reflux esophagitis and no intestinal metaplasia or dysplasia, no duodenitis and no H. pylori - CF 8/21: inadquate prep - random bx non-revealing for microscopic colitis. 6 polyps removed w/ largest TA was 12mm in AC. Pt to have repeat colonoscopy d/t inadequate prep - Colonoscopy Feb 2022 - diverticula seen - advised to avoid lactose products  # HCM - Last colonoscopy done in Feb. - Tdap 2015, updated on flu, coved x2 - Pneumococcal vaccine at 65  RTC 6 months F/u CBC, CMP, HgBA1C, TSH, Chest CT

## 2023-08-21 ENCOUNTER — OUTPATIENT (OUTPATIENT)
Dept: OUTPATIENT SERVICES | Facility: HOSPITAL | Age: 63
LOS: 1 days | End: 2023-08-21
Payer: MEDICAID

## 2023-08-21 DIAGNOSIS — K05.6 PERIODONTAL DISEASE, UNSPECIFIED: ICD-10-CM

## 2023-08-21 DIAGNOSIS — Z96.643 PRESENCE OF ARTIFICIAL HIP JOINT, BILATERAL: Chronic | ICD-10-CM

## 2023-08-21 DIAGNOSIS — Z98.890 OTHER SPECIFIED POSTPROCEDURAL STATES: Chronic | ICD-10-CM

## 2023-08-21 PROCEDURE — D9310: CPT

## 2023-08-22 DIAGNOSIS — I10 ESSENTIAL (PRIMARY) HYPERTENSION: ICD-10-CM

## 2023-08-22 DIAGNOSIS — E03.9 HYPOTHYROIDISM, UNSPECIFIED: ICD-10-CM

## 2023-08-22 DIAGNOSIS — E78.5 HYPERLIPIDEMIA, UNSPECIFIED: ICD-10-CM

## 2023-08-22 DIAGNOSIS — R91.8 OTHER NONSPECIFIC ABNORMAL FINDING OF LUNG FIELD: ICD-10-CM

## 2023-08-22 DIAGNOSIS — M54.30 SCIATICA, UNSPECIFIED SIDE: ICD-10-CM

## 2023-08-22 DIAGNOSIS — R73.03 PREDIABETES: ICD-10-CM

## 2023-09-16 ENCOUNTER — OUTPATIENT (OUTPATIENT)
Dept: OUTPATIENT SERVICES | Facility: HOSPITAL | Age: 63
LOS: 1 days | End: 2023-09-16
Payer: MEDICARE

## 2023-09-16 ENCOUNTER — RESULT REVIEW (OUTPATIENT)
Age: 63
End: 2023-09-16

## 2023-09-16 DIAGNOSIS — Z00.8 ENCOUNTER FOR OTHER GENERAL EXAMINATION: ICD-10-CM

## 2023-09-16 DIAGNOSIS — R91.8 OTHER NONSPECIFIC ABNORMAL FINDING OF LUNG FIELD: ICD-10-CM

## 2023-09-16 DIAGNOSIS — Z98.890 OTHER SPECIFIED POSTPROCEDURAL STATES: Chronic | ICD-10-CM

## 2023-09-16 DIAGNOSIS — Z96.643 PRESENCE OF ARTIFICIAL HIP JOINT, BILATERAL: Chronic | ICD-10-CM

## 2023-09-16 PROCEDURE — 71250 CT THORAX DX C-: CPT | Mod: 26,MH

## 2023-09-16 PROCEDURE — 71250 CT THORAX DX C-: CPT

## 2023-09-17 DIAGNOSIS — R91.8 OTHER NONSPECIFIC ABNORMAL FINDING OF LUNG FIELD: ICD-10-CM

## 2023-09-19 ENCOUNTER — APPOINTMENT (OUTPATIENT)
Dept: PODIATRY | Facility: CLINIC | Age: 63
End: 2023-09-19
Payer: MEDICARE

## 2023-09-19 ENCOUNTER — OUTPATIENT (OUTPATIENT)
Dept: OUTPATIENT SERVICES | Facility: HOSPITAL | Age: 63
LOS: 1 days | End: 2023-09-19
Payer: MEDICARE

## 2023-09-19 DIAGNOSIS — Z98.890 OTHER SPECIFIED POSTPROCEDURAL STATES: Chronic | ICD-10-CM

## 2023-09-19 DIAGNOSIS — Z96.643 PRESENCE OF ARTIFICIAL HIP JOINT, BILATERAL: Chronic | ICD-10-CM

## 2023-09-19 DIAGNOSIS — Z00.00 ENCOUNTER FOR GENERAL ADULT MEDICAL EXAMINATION WITHOUT ABNORMAL FINDINGS: ICD-10-CM

## 2023-09-19 PROCEDURE — 20605 DRAIN/INJ JOINT/BURSA W/O US: CPT | Mod: NC

## 2023-09-19 PROCEDURE — 20605 DRAIN/INJ JOINT/BURSA W/O US: CPT

## 2023-09-20 DIAGNOSIS — Z00.00 ENCOUNTER FOR GENERAL ADULT MEDICAL EXAMINATION WITHOUT ABNORMAL FINDINGS: ICD-10-CM

## 2023-09-21 ENCOUNTER — NON-APPOINTMENT (OUTPATIENT)
Age: 63
End: 2023-09-21

## 2023-09-29 DIAGNOSIS — M25.571 PAIN IN RIGHT ANKLE AND JOINTS OF RIGHT FOOT: ICD-10-CM
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
## 2023-09-29 DIAGNOSIS — M19.079 PRIMARY OSTEOARTHRITIS, UNSPECIFIED ANKLE AND FOOT: ICD-10-CM

## 2023-10-17 ENCOUNTER — OUTPATIENT (OUTPATIENT)
Dept: OUTPATIENT SERVICES | Facility: HOSPITAL | Age: 63
LOS: 1 days | End: 2023-10-17
Payer: MEDICAID

## 2023-10-17 ENCOUNTER — APPOINTMENT (OUTPATIENT)
Dept: PODIATRY | Facility: CLINIC | Age: 63
End: 2023-10-17
Payer: MEDICAID

## 2023-10-17 DIAGNOSIS — Z98.890 OTHER SPECIFIED POSTPROCEDURAL STATES: Chronic | ICD-10-CM

## 2023-10-17 DIAGNOSIS — Z00.00 ENCOUNTER FOR GENERAL ADULT MEDICAL EXAMINATION WITHOUT ABNORMAL FINDINGS: ICD-10-CM
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
## 2023-10-17 DIAGNOSIS — M25.571 PAIN IN RIGHT ANKLE AND JOINTS OF RIGHT FOOT: ICD-10-CM
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
## 2023-10-17 DIAGNOSIS — M19.079 PRIMARY OSTEOARTHRITIS, UNSPECIFIED ANKLE AND FOOT: ICD-10-CM

## 2023-10-17 DIAGNOSIS — Z96.643 PRESENCE OF ARTIFICIAL HIP JOINT, BILATERAL: Chronic | ICD-10-CM

## 2023-10-17 PROCEDURE — 20605 DRAIN/INJ JOINT/BURSA W/O US: CPT | Mod: NC

## 2023-10-17 PROCEDURE — 20605 DRAIN/INJ JOINT/BURSA W/O US: CPT

## 2023-12-12 ENCOUNTER — APPOINTMENT (OUTPATIENT)
Dept: PODIATRY | Facility: CLINIC | Age: 63
End: 2023-12-12
Payer: MEDICARE

## 2023-12-12 ENCOUNTER — OUTPATIENT (OUTPATIENT)
Dept: OUTPATIENT SERVICES | Facility: HOSPITAL | Age: 63
LOS: 1 days | End: 2023-12-12
Payer: MEDICARE

## 2023-12-12 DIAGNOSIS — Z00.00 ENCOUNTER FOR GENERAL ADULT MEDICAL EXAMINATION WITHOUT ABNORMAL FINDINGS: ICD-10-CM

## 2023-12-12 DIAGNOSIS — Z98.890 OTHER SPECIFIED POSTPROCEDURAL STATES: Chronic | ICD-10-CM

## 2023-12-12 DIAGNOSIS — M19.079 PRIMARY OSTEOARTHRITIS, UNSPECIFIED ANKLE AND FOOT: ICD-10-CM

## 2023-12-12 DIAGNOSIS — M79.671 PAIN IN RIGHT FOOT: ICD-10-CM

## 2023-12-12 DIAGNOSIS — Z96.643 PRESENCE OF ARTIFICIAL HIP JOINT, BILATERAL: Chronic | ICD-10-CM

## 2023-12-12 PROCEDURE — 20605 DRAIN/INJ JOINT/BURSA W/O US: CPT | Mod: NC

## 2023-12-12 PROCEDURE — 20605 DRAIN/INJ JOINT/BURSA W/O US: CPT

## 2023-12-26 PROBLEM — M79.671 RIGHT FOOT PAIN: Status: ACTIVE | Noted: 2019-06-27

## 2023-12-26 NOTE — HISTORY OF PRESENT ILLNESS
[Sneakers] : preet [FreeTextEntry1] : Mr. Kendrick came back in a month for R foot dorsum pain due to arthritis that gets better w/ monthly injections

## 2023-12-26 NOTE — ASSESSMENT
[FreeTextEntry1] : Assessment: R foot dorsum joint pain, arthritis of foot  Plan: Pt seen and eval Injection of 1.5cc 1% lido and 1.5cc 1% Kenalog 10    F/u in 1 month

## 2023-12-26 NOTE — PHYSICAL EXAM
[General Appearance - Alert] : alert [General Appearance - In No Acute Distress] : in no acute distress [General Appearance - Well Nourished] : well nourished [General Appearance - Well Developed] : well developed [1+] : left foot dorsalis pedis 1+ [de-identified] : R dorsum midfoot joint pain on palpation  [FreeTextEntry1] : No abnormality was found on R foot skin [Sensation] : the sensory exam was normal to light touch and pinprick [No Focal Deficits] : no focal deficits

## 2023-12-26 NOTE — PROCEDURE
[Right Foot] : was performed on the right foot [Therapeutic] : therapeutic [Consent Obtained] : Written consent was obtained prior to the procedure and is detailed in the patient's record [Patient] : Prior to the start of the procedure a time out was taken and the identity of the patient was confirmed via name and date of birth with the patient. The correct site and the procedure to be performed were confirmed. The correct side was confirmed if applicable. The availability of the correct equipment was verified [Ethyl Chloride] : ethyl chloride [___ ml Inj] : [unfilled] ~Uml [1%] : 1%  [Kenalog 10] : Kenalog 10 [Betamethasone] : Betamethasone [Tolerated Well] : tolerated the procedure well [Reports Improvement in Symptoms] : reports improvement in symptoms [No Complications] : There were no complications. [Instructions Given] : given handouts/patient instructions [Patient Instructed to Call] : instructed to call if redness at site, a decrease in range of motion or an increase in pain is noted after procedure. [de-identified] : 0.5 cc each

## 2024-01-11 ENCOUNTER — OUTPATIENT (OUTPATIENT)
Dept: OUTPATIENT SERVICES | Facility: HOSPITAL | Age: 64
LOS: 1 days | End: 2024-01-11
Payer: MEDICARE

## 2024-01-11 ENCOUNTER — APPOINTMENT (OUTPATIENT)
Dept: PODIATRY | Facility: CLINIC | Age: 64
End: 2024-01-11
Payer: MEDICARE

## 2024-01-11 DIAGNOSIS — M19.019 PRIMARY OSTEOARTHRITIS, UNSPECIFIED SHOULDER: ICD-10-CM

## 2024-01-11 DIAGNOSIS — M19.079 PRIMARY OSTEOARTHRITIS, UNSPECIFIED ANKLE AND FOOT: ICD-10-CM

## 2024-01-11 DIAGNOSIS — Z96.643 PRESENCE OF ARTIFICIAL HIP JOINT, BILATERAL: Chronic | ICD-10-CM
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
## 2024-01-11 DIAGNOSIS — Z98.890 OTHER SPECIFIED POSTPROCEDURAL STATES: Chronic | ICD-10-CM

## 2024-01-11 DIAGNOSIS — Z00.00 ENCOUNTER FOR GENERAL ADULT MEDICAL EXAMINATION WITHOUT ABNORMAL FINDINGS: ICD-10-CM

## 2024-01-11 PROCEDURE — 20605 DRAIN/INJ JOINT/BURSA W/O US: CPT | Mod: RT

## 2024-01-11 NOTE — PHYSICAL EXAM
[General Appearance - In No Acute Distress] : in no acute distress [General Appearance - Alert] : alert [General Appearance - Well Developed] : well developed [General Appearance - Well Nourished] : well nourished [1+] : left foot dorsalis pedis 1+ [de-identified] : R dorsum midfoot joint pain on palpation  [FreeTextEntry1] : No abnormality was found on R foot skin [Sensation] : the sensory exam was normal to light touch and pinprick [No Focal Deficits] : no focal deficits

## 2024-01-11 NOTE — PROCEDURE
[Right Foot] : was performed on the right foot [Therapeutic] : therapeutic [Consent Obtained] : Written consent was obtained prior to the procedure and is detailed in the patient's record [Patient] : Prior to the start of the procedure a time out was taken and the identity of the patient was confirmed via name and date of birth with the patient. The correct site and the procedure to be performed were confirmed. The correct side was confirmed if applicable. The availability of the correct equipment was verified [Ethyl Chloride] : ethyl chloride [___ ml Inj] : [unfilled] ~Uml [1%] : 1%  [Kenalog 10] : Kenalog 10 [Dexamethasone] : Dexamethasone [Tolerated Well] : tolerated the procedure well [Reports Improvement in Symptoms] : reports improvement in symptoms [No Complications] : There were no complications. [Instructions Given] : given handouts/patient instructions [Patient Instructed to Call] : instructed to call if redness at site, a decrease in range of motion or an increase in pain is noted after procedure. [de-identified] : 0.5 cc each

## 2024-01-11 NOTE — ASSESSMENT
[FreeTextEntry1] : Assessment: R foot dorsum joint pain, arthritis of foot  Plan: Pt seen and eval Injection of 1cc 1% lido and 05cc 1% Kenalog 10    F/u in 1 month- 6 weeks

## 2024-02-14 ENCOUNTER — OUTPATIENT (OUTPATIENT)
Dept: OUTPATIENT SERVICES | Facility: HOSPITAL | Age: 64
LOS: 1 days | End: 2024-02-14
Payer: MEDICARE

## 2024-02-14 DIAGNOSIS — Z96.643 PRESENCE OF ARTIFICIAL HIP JOINT, BILATERAL: Chronic | ICD-10-CM

## 2024-02-14 DIAGNOSIS — Z98.890 OTHER SPECIFIED POSTPROCEDURAL STATES: Chronic | ICD-10-CM

## 2024-02-14 PROCEDURE — 85027 COMPLETE CBC AUTOMATED: CPT

## 2024-02-14 PROCEDURE — 80061 LIPID PANEL: CPT

## 2024-02-14 PROCEDURE — 36415 COLL VENOUS BLD VENIPUNCTURE: CPT

## 2024-02-14 PROCEDURE — 83036 HEMOGLOBIN GLYCOSYLATED A1C: CPT

## 2024-02-14 PROCEDURE — 80053 COMPREHEN METABOLIC PANEL: CPT

## 2024-02-14 PROCEDURE — 84443 ASSAY THYROID STIM HORMONE: CPT

## 2024-02-20 DIAGNOSIS — Z00.00 ENCOUNTER FOR GENERAL ADULT MEDICAL EXAMINATION WITHOUT ABNORMAL FINDINGS: ICD-10-CM

## 2024-02-21 DIAGNOSIS — Z00.00 ENCOUNTER FOR GENERAL ADULT MEDICAL EXAMINATION WITHOUT ABNORMAL FINDINGS: ICD-10-CM

## 2024-02-21 LAB
ALBUMIN SERPL ELPH-MCNC: 4.7 G/DL
ALP BLD-CCNC: 92 U/L
ALT SERPL-CCNC: 25 U/L
ANION GAP SERPL CALC-SCNC: 15 MMOL/L
AST SERPL-CCNC: 25 U/L
BASOPHILS # BLD AUTO: 0.06 K/UL
BASOPHILS NFR BLD AUTO: 0.6 %
BILIRUB SERPL-MCNC: 0.4 MG/DL
BUN SERPL-MCNC: 12 MG/DL
CALCIUM SERPL-MCNC: 9.7 MG/DL
CHLORIDE SERPL-SCNC: 103 MMOL/L
CHOLEST SERPL-MCNC: 152 MG/DL
CO2 SERPL-SCNC: 26 MMOL/L
CREAT SERPL-MCNC: 0.9 MG/DL
EGFR: 96 ML/MIN/1.73M2
EOSINOPHIL # BLD AUTO: 0.28 K/UL
EOSINOPHIL NFR BLD AUTO: 2.6 %
ESTIMATED AVERAGE GLUCOSE: 111 MG/DL
GLUCOSE SERPL-MCNC: 93 MG/DL
HBA1C MFR BLD HPLC: 5.5 %
HCT VFR BLD CALC: 50.2 %
HDLC SERPL-MCNC: 59 MG/DL
HGB BLD-MCNC: 16.9 G/DL
IMM GRANULOCYTES NFR BLD AUTO: 0.3 %
LDLC SERPL CALC-MCNC: 75 MG/DL
LYMPHOCYTES # BLD AUTO: 4.36 K/UL
LYMPHOCYTES NFR BLD AUTO: 40.7 %
MAN DIFF?: NORMAL
MCHC RBC-ENTMCNC: 33.4 PG
MCHC RBC-ENTMCNC: 33.7 G/DL
MCV RBC AUTO: 99.2 FL
MONOCYTES # BLD AUTO: 0.67 K/UL
MONOCYTES NFR BLD AUTO: 6.3 %
NEUTROPHILS # BLD AUTO: 5.3 K/UL
NEUTROPHILS NFR BLD AUTO: 49.5 %
NONHDLC SERPL-MCNC: 93 MG/DL
PLATELET # BLD AUTO: 196 K/UL
PMV BLD AUTO: 0 /100 WBCS
POTASSIUM SERPL-SCNC: 4.8 MMOL/L
PROT SERPL-MCNC: 6.8 G/DL
RBC # BLD: 5.06 M/UL
RBC # FLD: 13.4 %
SODIUM SERPL-SCNC: 144 MMOL/L
TRIGL SERPL-MCNC: 91 MG/DL
TSH SERPL-ACNC: 3.71 UIU/ML
WBC # FLD AUTO: 10.7 K/UL

## 2024-02-29 ENCOUNTER — OUTPATIENT (OUTPATIENT)
Dept: OUTPATIENT SERVICES | Facility: HOSPITAL | Age: 64
LOS: 1 days | End: 2024-02-29
Payer: MEDICARE

## 2024-02-29 ENCOUNTER — APPOINTMENT (OUTPATIENT)
Dept: PODIATRY | Facility: CLINIC | Age: 64
End: 2024-02-29
Payer: MEDICARE

## 2024-02-29 DIAGNOSIS — Z00.00 ENCOUNTER FOR GENERAL ADULT MEDICAL EXAMINATION WITHOUT ABNORMAL FINDINGS: ICD-10-CM

## 2024-02-29 DIAGNOSIS — Z98.890 OTHER SPECIFIED POSTPROCEDURAL STATES: Chronic | ICD-10-CM

## 2024-02-29 DIAGNOSIS — Z96.643 PRESENCE OF ARTIFICIAL HIP JOINT, BILATERAL: Chronic | ICD-10-CM

## 2024-02-29 DIAGNOSIS — M25.571 PAIN IN RIGHT ANKLE AND JOINTS OF RIGHT FOOT: ICD-10-CM

## 2024-02-29 PROCEDURE — 20605 DRAIN/INJ JOINT/BURSA W/O US: CPT | Mod: RT

## 2024-02-29 NOTE — PHYSICAL EXAM
[General Appearance - Alert] : alert [General Appearance - In No Acute Distress] : in no acute distress [General Appearance - Well Nourished] : well nourished [General Appearance - Well Developed] : well developed [1+] : left foot dorsalis pedis 1+ [Sensation] : the sensory exam was normal to light touch and pinprick [No Focal Deficits] : no focal deficits [de-identified] : R dorsum midfoot joint pain on palpation  [FreeTextEntry1] : No abnormality was found on R foot skin

## 2024-02-29 NOTE — PROCEDURE
[Therapeutic] : therapeutic [Right Foot] : was performed on the right foot [Consent Obtained] : Written consent was obtained prior to the procedure and is detailed in the patient's record [Patient] : Prior to the start of the procedure a time out was taken and the identity of the patient was confirmed via name and date of birth with the patient. The correct site and the procedure to be performed were confirmed. The correct side was confirmed if applicable. The availability of the correct equipment was verified [Ethyl Chloride] : ethyl chloride [___ ml Inj] : [unfilled] ~Uml [Kenalog 10] : Kenalog 10 [1%] : 1%  [Dexamethasone] : Dexamethasone [Reports Improvement in Symptoms] : reports improvement in symptoms [Tolerated Well] : tolerated the procedure well [No Complications] : There were no complications. [Patient Instructed to Call] : instructed to call if redness at site, a decrease in range of motion or an increase in pain is noted after procedure. [Instructions Given] : given handouts/patient instructions [de-identified] : 0.5 cc each

## 2024-03-06 ENCOUNTER — APPOINTMENT (OUTPATIENT)
Dept: INTERNAL MEDICINE | Facility: CLINIC | Age: 64
End: 2024-03-06
Payer: MEDICARE

## 2024-03-06 ENCOUNTER — OUTPATIENT (OUTPATIENT)
Dept: OUTPATIENT SERVICES | Facility: HOSPITAL | Age: 64
LOS: 1 days | End: 2024-03-06
Payer: MEDICARE

## 2024-03-06 VITALS
HEIGHT: 72 IN | OXYGEN SATURATION: 99 % | HEART RATE: 69 BPM | BODY MASS INDEX: 27.09 KG/M2 | DIASTOLIC BLOOD PRESSURE: 93 MMHG | SYSTOLIC BLOOD PRESSURE: 150 MMHG | WEIGHT: 200 LBS

## 2024-03-06 DIAGNOSIS — R91.1 SOLITARY PULMONARY NODULE: ICD-10-CM

## 2024-03-06 DIAGNOSIS — R93.89 ABNORMAL FINDINGS ON DIAGNOSTIC IMAGING OF OTHER SPECIFIED BODY STRUCTURES: ICD-10-CM
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
## 2024-03-06 DIAGNOSIS — E04.1 NONTOXIC SINGLE THYROID NODULE: ICD-10-CM

## 2024-03-06 DIAGNOSIS — E03.9 HYPOTHYROIDISM, UNSPECIFIED: ICD-10-CM

## 2024-03-06 DIAGNOSIS — F17.200 NICOTINE DEPENDENCE, UNSPECIFIED, UNCOMPLICATED: ICD-10-CM

## 2024-03-06 DIAGNOSIS — K59.00 CONSTIPATION, UNSPECIFIED: ICD-10-CM

## 2024-03-06 DIAGNOSIS — M19.071 PRIMARY OSTEOARTHRITIS, RIGHT ANKLE AND FOOT: ICD-10-CM

## 2024-03-06 DIAGNOSIS — K21.9 GASTRO-ESOPHAGEAL REFLUX DISEASE W/OUT ESOPHAGITIS: ICD-10-CM

## 2024-03-06 DIAGNOSIS — M25.571 PAIN IN RIGHT ANKLE AND JOINTS OF RIGHT FOOT: ICD-10-CM

## 2024-03-06 DIAGNOSIS — I10 ESSENTIAL (PRIMARY) HYPERTENSION: ICD-10-CM

## 2024-03-06 DIAGNOSIS — M54.50 LOW BACK PAIN, UNSPECIFIED: ICD-10-CM
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
## 2024-03-06 DIAGNOSIS — Z00.00 ENCOUNTER FOR GENERAL ADULT MEDICAL EXAMINATION WITHOUT ABNORMAL FINDINGS: ICD-10-CM

## 2024-03-06 DIAGNOSIS — E78.5 HYPERLIPIDEMIA, UNSPECIFIED: ICD-10-CM

## 2024-03-06 DIAGNOSIS — M54.30 SCIATICA, UNSPECIFIED SIDE: ICD-10-CM

## 2024-03-06 DIAGNOSIS — Z96.643 PRESENCE OF ARTIFICIAL HIP JOINT, BILATERAL: Chronic | ICD-10-CM

## 2024-03-06 DIAGNOSIS — Z98.890 OTHER SPECIFIED POSTPROCEDURAL STATES: Chronic | ICD-10-CM

## 2024-03-06 PROCEDURE — 99214 OFFICE O/P EST MOD 30 MIN: CPT

## 2024-03-06 RX ORDER — PANTOPRAZOLE 40 MG/1
40 TABLET, DELAYED RELEASE ORAL
Qty: 90 | Refills: 3 | Status: COMPLETED | COMMUNITY
Start: 2021-10-29 | End: 2024-03-06

## 2024-03-06 RX ORDER — AMLODIPINE BESYLATE 5 MG/1
5 TABLET ORAL
Qty: 90 | Refills: 2 | Status: ACTIVE | COMMUNITY
Start: 2017-04-12 | End: 1900-01-01

## 2024-03-06 RX ORDER — ROSUVASTATIN CALCIUM 10 MG/1
10 TABLET, FILM COATED ORAL
Qty: 90 | Refills: 2 | Status: ACTIVE | COMMUNITY
Start: 2017-10-25 | End: 1900-01-01

## 2024-03-06 RX ORDER — CYCLOBENZAPRINE HYDROCHLORIDE 10 MG/1
10 TABLET, FILM COATED ORAL 3 TIMES DAILY
Qty: 90 | Refills: 3 | Status: COMPLETED | COMMUNITY
Start: 2023-05-22 | End: 2024-03-06

## 2024-03-06 RX ORDER — FLUTICASONE PROPIONATE 50 UG/1
50 SPRAY, METERED NASAL DAILY
Qty: 1 | Refills: 3 | Status: ACTIVE | COMMUNITY
Start: 2019-03-05 | End: 1900-01-01

## 2024-03-06 RX ORDER — ASPIRIN ENTERIC COATED TABLETS 81 MG 81 MG/1
81 TABLET, DELAYED RELEASE ORAL DAILY
Qty: 90 | Refills: 2 | Status: ACTIVE | COMMUNITY
Start: 2018-04-12 | End: 1900-01-01

## 2024-03-06 RX ORDER — MELOXICAM 15 MG/1
15 TABLET ORAL DAILY
Qty: 90 | Refills: 3 | Status: ACTIVE | COMMUNITY
Start: 1900-01-01 | End: 1900-01-01

## 2024-03-06 RX ORDER — POLYETHYLENE GLYCOL 3350 17 G/17G
17 POWDER, FOR SOLUTION ORAL DAILY
Qty: 510 | Refills: 3 | Status: ACTIVE | COMMUNITY
Start: 2024-03-06 | End: 1900-01-01

## 2024-03-06 RX ORDER — LEVOTHYROXINE SODIUM 0.03 MG/1
25 TABLET ORAL DAILY
Qty: 90 | Refills: 2 | Status: ACTIVE | COMMUNITY
Start: 2020-04-10 | End: 1900-01-01

## 2024-03-06 NOTE — HISTORY OF PRESENT ILLNESS
[FreeTextEntry1] : follow up  [de-identified] : 62 y/o M PMHx HTN, DLD, pulmonary nodules, thyroid mass s/p partial hemithyroidectomy, osteoarthritis and chronic low back pain/sciatica, L foot drop, s/p b/l hip replacement 2010 with residual L left length discrepancy, R foot pain s/p lido injection. Pt feels well overall but complains of constipation and bloating. He's been using dairy milk and products again recently. He also complains of a few hours a day of nasal congestion without rhinorrhea, itchiness or cough. He uses a nasal spray but doesn't know which one.

## 2024-03-06 NOTE — REVIEW OF SYSTEMS
[Constipation] : constipation [Fever] : no fever [Chills] : no chills [Chest Pain] : no chest pain [Shortness Of Breath] : no shortness of breath [Abdominal Pain] : no abdominal pain [Headache] : no headache

## 2024-03-06 NOTE — PHYSICAL EXAM
[No Acute Distress] : no acute distress [No Respiratory Distress] : no respiratory distress  [No Accessory Muscle Use] : no accessory muscle use [Clear to Auscultation] : lungs were clear to auscultation bilaterally [Normal Rate] : normal rate  [Normal S1, S2] : normal S1 and S2 [Regular Rhythm] : with a regular rhythm [No Edema] : there was no peripheral edema [Soft] : abdomen soft [Non Tender] : non-tender [Non-distended] : non-distended

## 2024-03-11 DIAGNOSIS — I10 ESSENTIAL (PRIMARY) HYPERTENSION: ICD-10-CM

## 2024-03-11 DIAGNOSIS — K59.00 CONSTIPATION, UNSPECIFIED: ICD-10-CM

## 2024-03-11 DIAGNOSIS — E03.9 HYPOTHYROIDISM, UNSPECIFIED: ICD-10-CM

## 2024-03-11 DIAGNOSIS — R93.89 ABNORMAL FINDINGS ON DIAGNOSTIC IMAGING OF OTHER SPECIFIED BODY STRUCTURES: ICD-10-CM

## 2024-03-11 DIAGNOSIS — E04.1 NONTOXIC SINGLE THYROID NODULE: ICD-10-CM

## 2024-03-11 DIAGNOSIS — M54.50 LOW BACK PAIN, UNSPECIFIED: ICD-10-CM

## 2024-03-11 DIAGNOSIS — K21.9 GASTRO-ESOPHAGEAL REFLUX DISEASE WITHOUT ESOPHAGITIS: ICD-10-CM

## 2024-03-11 DIAGNOSIS — E78.5 HYPERLIPIDEMIA, UNSPECIFIED: ICD-10-CM

## 2024-03-19 ENCOUNTER — OUTPATIENT (OUTPATIENT)
Dept: OUTPATIENT SERVICES | Facility: HOSPITAL | Age: 64
LOS: 1 days | End: 2024-03-19
Payer: MEDICAID

## 2024-03-19 DIAGNOSIS — Z98.890 OTHER SPECIFIED POSTPROCEDURAL STATES: Chronic | ICD-10-CM

## 2024-03-19 DIAGNOSIS — Z01.20 ENCOUNTER FOR DENTAL EXAMINATION AND CLEANING WITHOUT ABNORMAL FINDINGS: ICD-10-CM

## 2024-03-19 DIAGNOSIS — Z96.643 PRESENCE OF ARTIFICIAL HIP JOINT, BILATERAL: Chronic | ICD-10-CM

## 2024-03-19 PROCEDURE — D0220: CPT

## 2024-03-19 PROCEDURE — D1110: CPT

## 2024-03-19 PROCEDURE — D0230: CPT

## 2024-03-19 PROCEDURE — D0120: CPT

## 2024-03-21 ENCOUNTER — OUTPATIENT (OUTPATIENT)
Dept: OUTPATIENT SERVICES | Facility: HOSPITAL | Age: 64
LOS: 1 days | End: 2024-03-21
Payer: MEDICARE

## 2024-03-21 DIAGNOSIS — R93.89 ABNORMAL FINDINGS ON DIAGNOSTIC IMAGING OF OTHER SPECIFIED BODY STRUCTURES: ICD-10-CM

## 2024-03-21 DIAGNOSIS — Z96.643 PRESENCE OF ARTIFICIAL HIP JOINT, BILATERAL: Chronic | ICD-10-CM

## 2024-03-21 DIAGNOSIS — Z00.8 ENCOUNTER FOR OTHER GENERAL EXAMINATION: ICD-10-CM

## 2024-03-21 DIAGNOSIS — Z01.21 ENCOUNTER FOR DENTAL EXAMINATION AND CLEANING WITH ABNORMAL FINDINGS: ICD-10-CM

## 2024-03-21 DIAGNOSIS — Z98.890 OTHER SPECIFIED POSTPROCEDURAL STATES: Chronic | ICD-10-CM

## 2024-03-21 PROCEDURE — 71250 CT THORAX DX C-: CPT

## 2024-03-21 PROCEDURE — 71250 CT THORAX DX C-: CPT | Mod: 26,MH

## 2024-03-22 DIAGNOSIS — R93.89 ABNORMAL FINDINGS ON DIAGNOSTIC IMAGING OF OTHER SPECIFIED BODY STRUCTURES: ICD-10-CM

## 2024-03-29 ENCOUNTER — OUTPATIENT (OUTPATIENT)
Dept: OUTPATIENT SERVICES | Facility: HOSPITAL | Age: 64
LOS: 1 days | End: 2024-03-29
Payer: MEDICARE

## 2024-03-29 ENCOUNTER — APPOINTMENT (OUTPATIENT)
Dept: PODIATRY | Facility: CLINIC | Age: 64
End: 2024-03-29
Payer: MEDICARE

## 2024-03-29 DIAGNOSIS — Z96.643 PRESENCE OF ARTIFICIAL HIP JOINT, BILATERAL: Chronic | ICD-10-CM

## 2024-03-29 DIAGNOSIS — Z98.890 OTHER SPECIFIED POSTPROCEDURAL STATES: Chronic | ICD-10-CM

## 2024-03-29 DIAGNOSIS — Z00.00 ENCOUNTER FOR GENERAL ADULT MEDICAL EXAMINATION WITHOUT ABNORMAL FINDINGS: ICD-10-CM

## 2024-03-29 PROCEDURE — 20605 DRAIN/INJ JOINT/BURSA W/O US: CPT | Mod: RT

## 2024-03-29 NOTE — PHYSICAL EXAM
[General Appearance - Alert] : alert [General Appearance - In No Acute Distress] : in no acute distress [General Appearance - Well Nourished] : well nourished [General Appearance - Well Developed] : well developed [1+] : left foot dorsalis pedis 1+ [de-identified] : R dorsum midfoot joint pain on palpation  [FreeTextEntry1] : No abnormality was found on R foot skin [Sensation] : the sensory exam was normal to light touch and pinprick [No Focal Deficits] : no focal deficits

## 2024-03-29 NOTE — PROCEDURE
[Right Foot] : was performed on the right foot [Therapeutic] : therapeutic [Consent Obtained] : Written consent was obtained prior to the procedure and is detailed in the patient's record [Ethyl Chloride] : ethyl chloride [Patient] : Prior to the start of the procedure a time out was taken and the identity of the patient was confirmed via name and date of birth with the patient. The correct site and the procedure to be performed were confirmed. The correct side was confirmed if applicable. The availability of the correct equipment was verified [___ ml Inj] : [unfilled] ~Uml [1%] : 1%  [Kenalog 10] : Kenalog 10 [Dexamethasone] : Dexamethasone [Reports Improvement in Symptoms] : reports improvement in symptoms [No Complications] : There were no complications. [Tolerated Well] : tolerated the procedure well [Instructions Given] : given handouts/patient instructions [Patient Instructed to Call] : instructed to call if redness at site, a decrease in range of motion or an increase in pain is noted after procedure. [de-identified] : 0.5 cc each
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
## 2024-04-09 DIAGNOSIS — M79.671 PAIN IN RIGHT FOOT: ICD-10-CM

## 2024-04-09 DIAGNOSIS — M19.079 PRIMARY OSTEOARTHRITIS, UNSPECIFIED ANKLE AND FOOT: ICD-10-CM
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
## 2024-04-26 ENCOUNTER — NON-APPOINTMENT (OUTPATIENT)
Age: 64
End: 2024-04-26

## 2024-04-28 ENCOUNTER — INPATIENT (INPATIENT)
Facility: HOSPITAL | Age: 64
LOS: 7 days | Discharge: HOME | DRG: 603 | End: 2024-05-06
Attending: STUDENT IN AN ORGANIZED HEALTH CARE EDUCATION/TRAINING PROGRAM | Admitting: INTERNAL MEDICINE
Payer: MEDICARE

## 2024-04-28 VITALS
DIASTOLIC BLOOD PRESSURE: 94 MMHG | WEIGHT: 199.96 LBS | HEART RATE: 91 BPM | SYSTOLIC BLOOD PRESSURE: 137 MMHG | OXYGEN SATURATION: 99 % | TEMPERATURE: 99 F | RESPIRATION RATE: 18 BRPM

## 2024-04-28 DIAGNOSIS — Z98.890 OTHER SPECIFIED POSTPROCEDURAL STATES: Chronic | ICD-10-CM

## 2024-04-28 DIAGNOSIS — L03.90 CELLULITIS, UNSPECIFIED: ICD-10-CM

## 2024-04-28 DIAGNOSIS — Z96.643 PRESENCE OF ARTIFICIAL HIP JOINT, BILATERAL: Chronic | ICD-10-CM

## 2024-04-28 LAB
ALBUMIN SERPL ELPH-MCNC: 4.1 G/DL — SIGNIFICANT CHANGE UP (ref 3.5–5.2)
ALP SERPL-CCNC: 94 U/L — SIGNIFICANT CHANGE UP (ref 30–115)
ALT FLD-CCNC: 51 U/L — HIGH (ref 0–41)
ANION GAP SERPL CALC-SCNC: 14 MMOL/L — SIGNIFICANT CHANGE UP (ref 7–14)
AST SERPL-CCNC: 61 U/L — HIGH (ref 0–41)
BASOPHILS # BLD AUTO: 0.04 K/UL — SIGNIFICANT CHANGE UP (ref 0–0.2)
BASOPHILS NFR BLD AUTO: 0.3 % — SIGNIFICANT CHANGE UP (ref 0–1)
BILIRUB SERPL-MCNC: 0.8 MG/DL — SIGNIFICANT CHANGE UP (ref 0.2–1.2)
BUN SERPL-MCNC: 18 MG/DL — SIGNIFICANT CHANGE UP (ref 10–20)
CALCIUM SERPL-MCNC: 9.7 MG/DL — SIGNIFICANT CHANGE UP (ref 8.4–10.4)
CHLORIDE SERPL-SCNC: 100 MMOL/L — SIGNIFICANT CHANGE UP (ref 98–110)
CO2 SERPL-SCNC: 25 MMOL/L — SIGNIFICANT CHANGE UP (ref 17–32)
CREAT SERPL-MCNC: 1 MG/DL — SIGNIFICANT CHANGE UP (ref 0.7–1.5)
EGFR: 85 ML/MIN/1.73M2 — SIGNIFICANT CHANGE UP
EOSINOPHIL # BLD AUTO: 0.04 K/UL — SIGNIFICANT CHANGE UP (ref 0–0.7)
EOSINOPHIL NFR BLD AUTO: 0.3 % — SIGNIFICANT CHANGE UP (ref 0–8)
GLUCOSE SERPL-MCNC: 146 MG/DL — HIGH (ref 70–99)
HCT VFR BLD CALC: 47.4 % — SIGNIFICANT CHANGE UP (ref 42–52)
HGB BLD-MCNC: 16.2 G/DL — SIGNIFICANT CHANGE UP (ref 14–18)
IMM GRANULOCYTES NFR BLD AUTO: 0.3 % — SIGNIFICANT CHANGE UP (ref 0.1–0.3)
LACTATE SERPL-SCNC: 1.3 MMOL/L — SIGNIFICANT CHANGE UP (ref 0.7–2)
LYMPHOCYTES # BLD AUTO: 25.6 % — SIGNIFICANT CHANGE UP (ref 20.5–51.1)
LYMPHOCYTES # BLD AUTO: 3.02 K/UL — SIGNIFICANT CHANGE UP (ref 1.2–3.4)
MCHC RBC-ENTMCNC: 33.5 PG — HIGH (ref 27–31)
MCHC RBC-ENTMCNC: 34.2 G/DL — SIGNIFICANT CHANGE UP (ref 32–37)
MCV RBC AUTO: 97.9 FL — HIGH (ref 80–94)
MONOCYTES # BLD AUTO: 0.88 K/UL — HIGH (ref 0.1–0.6)
MONOCYTES NFR BLD AUTO: 7.5 % — SIGNIFICANT CHANGE UP (ref 1.7–9.3)
NEUTROPHILS # BLD AUTO: 7.77 K/UL — HIGH (ref 1.4–6.5)
NEUTROPHILS NFR BLD AUTO: 66 % — SIGNIFICANT CHANGE UP (ref 42.2–75.2)
NRBC # BLD: 0 /100 WBCS — SIGNIFICANT CHANGE UP (ref 0–0)
PLATELET # BLD AUTO: 191 K/UL — SIGNIFICANT CHANGE UP (ref 130–400)
PMV BLD: 10.7 FL — HIGH (ref 7.4–10.4)
POTASSIUM SERPL-MCNC: 5 MMOL/L — SIGNIFICANT CHANGE UP (ref 3.5–5)
POTASSIUM SERPL-SCNC: 5 MMOL/L — SIGNIFICANT CHANGE UP (ref 3.5–5)
PROT SERPL-MCNC: 6.8 G/DL — SIGNIFICANT CHANGE UP (ref 6–8)
RBC # BLD: 4.84 M/UL — SIGNIFICANT CHANGE UP (ref 4.7–6.1)
RBC # FLD: 13.7 % — SIGNIFICANT CHANGE UP (ref 11.5–14.5)
SODIUM SERPL-SCNC: 139 MMOL/L — SIGNIFICANT CHANGE UP (ref 135–146)
WBC # BLD: 11.78 K/UL — HIGH (ref 4.8–10.8)
WBC # FLD AUTO: 11.78 K/UL — HIGH (ref 4.8–10.8)

## 2024-04-28 PROCEDURE — 36415 COLL VENOUS BLD VENIPUNCTURE: CPT

## 2024-04-28 PROCEDURE — 92610 EVALUATE SWALLOWING FUNCTION: CPT | Mod: GN

## 2024-04-28 PROCEDURE — 85025 COMPLETE CBC W/AUTO DIFF WBC: CPT

## 2024-04-28 PROCEDURE — 85652 RBC SED RATE AUTOMATED: CPT

## 2024-04-28 PROCEDURE — 73620 X-RAY EXAM OF FOOT: CPT | Mod: RT

## 2024-04-28 PROCEDURE — 70491 CT SOFT TISSUE NECK W/DYE: CPT | Mod: 26,MC

## 2024-04-28 PROCEDURE — 85027 COMPLETE CBC AUTOMATED: CPT

## 2024-04-28 PROCEDURE — 87651 STREP A DNA AMP PROBE: CPT

## 2024-04-28 PROCEDURE — 87070 CULTURE OTHR SPECIMN AEROBIC: CPT

## 2024-04-28 PROCEDURE — 99285 EMERGENCY DEPT VISIT HI MDM: CPT | Mod: GC

## 2024-04-28 PROCEDURE — 99223 1ST HOSP IP/OBS HIGH 75: CPT

## 2024-04-28 PROCEDURE — 97162 PT EVAL MOD COMPLEX 30 MIN: CPT | Mod: GP

## 2024-04-28 PROCEDURE — 87205 SMEAR GRAM STAIN: CPT

## 2024-04-28 PROCEDURE — 87798 DETECT AGENT NOS DNA AMP: CPT

## 2024-04-28 PROCEDURE — 87040 BLOOD CULTURE FOR BACTERIA: CPT

## 2024-04-28 PROCEDURE — 82962 GLUCOSE BLOOD TEST: CPT

## 2024-04-28 PROCEDURE — 84550 ASSAY OF BLOOD/URIC ACID: CPT

## 2024-04-28 PROCEDURE — 80048 BASIC METABOLIC PNL TOTAL CA: CPT

## 2024-04-28 PROCEDURE — 73718 MRI LOWER EXTREMITY W/O DYE: CPT | Mod: MC,RT

## 2024-04-28 PROCEDURE — 86140 C-REACTIVE PROTEIN: CPT

## 2024-04-28 PROCEDURE — D7140: CPT

## 2024-04-28 PROCEDURE — D0140: CPT

## 2024-04-28 PROCEDURE — 87077 CULTURE AEROBIC IDENTIFY: CPT

## 2024-04-28 PROCEDURE — D7510: CPT

## 2024-04-28 PROCEDURE — 93005 ELECTROCARDIOGRAM TRACING: CPT

## 2024-04-28 PROCEDURE — 80053 COMPREHEN METABOLIC PANEL: CPT

## 2024-04-28 PROCEDURE — 87081 CULTURE SCREEN ONLY: CPT

## 2024-04-28 PROCEDURE — 83735 ASSAY OF MAGNESIUM: CPT

## 2024-04-28 RX ORDER — DEXAMETHASONE 0.5 MG/5ML
6 ELIXIR ORAL EVERY 8 HOURS
Refills: 0 | Status: COMPLETED | OUTPATIENT
Start: 2024-04-28 | End: 2024-04-29

## 2024-04-28 RX ORDER — GABAPENTIN 400 MG/1
100 CAPSULE ORAL THREE TIMES A DAY
Refills: 0 | Status: DISCONTINUED | OUTPATIENT
Start: 2024-04-28 | End: 2024-05-06

## 2024-04-28 RX ORDER — LEVOTHYROXINE SODIUM 125 MCG
25 TABLET ORAL DAILY
Refills: 0 | Status: DISCONTINUED | OUTPATIENT
Start: 2024-04-28 | End: 2024-05-06

## 2024-04-28 RX ORDER — ACETAMINOPHEN 500 MG
650 TABLET ORAL EVERY 6 HOURS
Refills: 0 | Status: DISCONTINUED | OUTPATIENT
Start: 2024-04-28 | End: 2024-05-06

## 2024-04-28 RX ORDER — AMLODIPINE BESYLATE 2.5 MG/1
1 TABLET ORAL
Refills: 0 | DISCHARGE

## 2024-04-28 RX ORDER — PANTOPRAZOLE SODIUM 20 MG/1
40 TABLET, DELAYED RELEASE ORAL
Refills: 0 | Status: DISCONTINUED | OUTPATIENT
Start: 2024-04-28 | End: 2024-05-06

## 2024-04-28 RX ORDER — ATORVASTATIN CALCIUM 80 MG/1
40 TABLET, FILM COATED ORAL AT BEDTIME
Refills: 0 | Status: DISCONTINUED | OUTPATIENT
Start: 2024-04-28 | End: 2024-05-06

## 2024-04-28 RX ORDER — METHOCARBAMOL 500 MG/1
750 TABLET, FILM COATED ORAL DAILY
Refills: 0 | Status: DISCONTINUED | OUTPATIENT
Start: 2024-04-28 | End: 2024-04-28

## 2024-04-28 RX ORDER — ASPIRIN/CALCIUM CARB/MAGNESIUM 324 MG
81 TABLET ORAL DAILY
Refills: 0 | Status: DISCONTINUED | OUTPATIENT
Start: 2024-04-28 | End: 2024-05-06

## 2024-04-28 RX ORDER — ENOXAPARIN SODIUM 100 MG/ML
40 INJECTION SUBCUTANEOUS EVERY 24 HOURS
Refills: 0 | Status: DISCONTINUED | OUTPATIENT
Start: 2024-04-28 | End: 2024-05-06

## 2024-04-28 RX ORDER — AMLODIPINE BESYLATE 2.5 MG/1
10 TABLET ORAL DAILY
Refills: 0 | Status: DISCONTINUED | OUTPATIENT
Start: 2024-04-28 | End: 2024-04-28

## 2024-04-28 RX ORDER — AMLODIPINE BESYLATE 2.5 MG/1
1 TABLET ORAL
Qty: 0 | Refills: 0 | DISCHARGE

## 2024-04-28 RX ORDER — DEXAMETHASONE 0.5 MG/5ML
10 ELIXIR ORAL ONCE
Refills: 0 | Status: COMPLETED | OUTPATIENT
Start: 2024-04-28 | End: 2024-04-28

## 2024-04-28 RX ORDER — AMLODIPINE BESYLATE 2.5 MG/1
5 TABLET ORAL DAILY
Refills: 0 | Status: DISCONTINUED | OUTPATIENT
Start: 2024-04-28 | End: 2024-05-06

## 2024-04-28 RX ORDER — CYCLOBENZAPRINE HYDROCHLORIDE 10 MG/1
1 TABLET, FILM COATED ORAL
Refills: 0 | DISCHARGE

## 2024-04-28 RX ORDER — METHOCARBAMOL 500 MG/1
750 TABLET, FILM COATED ORAL EVERY 8 HOURS
Refills: 0 | Status: DISCONTINUED | OUTPATIENT
Start: 2024-04-28 | End: 2024-05-06

## 2024-04-28 RX ADMIN — ENOXAPARIN SODIUM 40 MILLIGRAM(S): 100 INJECTION SUBCUTANEOUS at 22:51

## 2024-04-28 RX ADMIN — GABAPENTIN 100 MILLIGRAM(S): 400 CAPSULE ORAL at 22:52

## 2024-04-28 RX ADMIN — Medication 100 MILLIGRAM(S): at 23:04

## 2024-04-28 RX ADMIN — Medication 6 MILLIGRAM(S): at 21:36

## 2024-04-28 RX ADMIN — ATORVASTATIN CALCIUM 40 MILLIGRAM(S): 80 TABLET, FILM COATED ORAL at 22:52

## 2024-04-28 RX ADMIN — Medication 10 MILLIGRAM(S): at 15:04

## 2024-04-28 RX ADMIN — Medication 100 MILLIGRAM(S): at 18:45

## 2024-04-28 NOTE — H&P ADULT - HISTORY OF PRESENT ILLNESS
63 year old male PMHx HTN, HLD, COPD, Hypothyroidism, presents to the ED for left neck swelling  He went to urgent care yesterday and was started on Azithro for strep throat    T 99.3, HR 91, /94, RR 18, 99% on RA  WBC 12, AST 61, ALT 51, lactate 1.3  CT neck prelim: Indistinct hypodensity with local mass effect measuring approximately 2.5 x 2.1 adjacent to the left submandibular gland suspicious for infectious process without distinct drainable fluid collection.  Received Decadron and Clindamycin  Admit to Medicine    63 year old male PMHx HTN, HLD, COPD, Hypothyroidism, presents to the ED for left neck swelling. Pt tried putting his dentures in that he has not worn for a long time. He reports pain and "nerve damage" since putting them in 1 week ago. 2 days ago he developed a sore throat and low grade fevers so he went to urgent care and was started on Azithro for strep throat. Pt reports difficulty opening his jaw and eating. He also reports worsening swelling on the left side of his neck so he came to the ED. Denies chest pain, SOB, dysphagia, N/V/D.    T 99.3, HR 91, /94, RR 18, 99% on RA  WBC 12, AST 61, ALT 51, lactate 1.3  CT neck prelim: Indistinct hypodensity with local mass effect measuring approximately 2.5 x 2.1 adjacent to the left submandibular gland suspicious for infectious process without distinct drainable fluid collection.  Received Decadron and Clindamycin  Admit to Medicine

## 2024-04-28 NOTE — H&P ADULT - ATTENDING COMMENTS
63 year old male PMHx HTN, HLD, COPD, Hypothyroidism, presents to the ED for left neck swelling after putting in dentures that don't fit well. He went to urgent care yesterday and was started on Azithro for strep throat. CT neck prelim showing infectious process without distinct drainable fluid collection.     Agree  with assessment  except for changes below.   Vital Signs Last 24 Hrs  T(C): 37.7 (28 Apr 2024 17:25), Max: 37.7 (28 Apr 2024 17:25)  T(F): 99.9 (28 Apr 2024 17:25), Max: 99.9 (28 Apr 2024 17:25)  HR: 76 (28 Apr 2024 17:25) (76 - 91)  BP: 134/75 (28 Apr 2024 17:25) (134/75 - 137/94)  BP(mean): --  RR: 18 (28 Apr 2024 17:25) (18 - 18)  SpO2: 95% (28 Apr 2024 17:25) (95% - 99%)    Parameters below as of 28 Apr 2024 17:25  Patient On (Oxygen Delivery Method): room air    CT N: Indistinct hypodensity with local mass effect measuring approximately 2.5   x 2.1 adjacent to the left submandibular gland suspicious for infectious   process without distinct drainable fluid collection.    IMPRESSION  Suspected Left neck Cellulitis   Left  Neck  Swelling Associated  with indistinct hypodensity with local mass  Left submandibular gland suspicious for infectious Process  Hemodynamically Stable  to night of  airway compromise  Non Septic on Admission   Patient Has Pencillin Allergy   Continue clindamycine   Send Bld Cultures ESR CRP, HIV  F/u ID  and Official ENT  recs       Mild Transaminitis   - AST 61, ALT 51  - no abdominal pain  - trend LFTs   - RUQ if  worsening   - avoid hepatotoxic meds    Hx HTN  - c/w Amlodipine 5mg qd    Hx  HLD  - c/w statin ( hold if  worsening  LFTs)    Hx  Hypothyroidism  - c/w Synthroid 25 mcg qd 63 year old male PMHx HTN, HLD, COPD, Hypothyroidism, presents to the ED for left neck swelling after putting in dentures that don't fit well. He went to urgent care yesterday and was started on Azithro for strep throat. CT neck prelim showing infectious process without distinct drainable fluid collection.     Agree  with assessment  except for changes below.   Vital Signs Last 24 Hrs  T(C): 37.7 (28 Apr 2024 17:25), Max: 37.7 (28 Apr 2024 17:25)  T(F): 99.9 (28 Apr 2024 17:25), Max: 99.9 (28 Apr 2024 17:25)  HR: 76 (28 Apr 2024 17:25) (76 - 91)  BP: 134/75 (28 Apr 2024 17:25) (134/75 - 137/94)  BP(mean): --  RR: 18 (28 Apr 2024 17:25) (18 - 18)  SpO2: 95% (28 Apr 2024 17:25) (95% - 99%)    Parameters below as of 28 Apr 2024 17:25  Patient On (Oxygen Delivery Method): room air    CT N: Indistinct hypodensity with local mass effect measuring approximately 2.5   x 2.1 adjacent to the left submandibular gland suspicious for infectious   process without distinct drainable fluid collection.    PHYSICAL EXAM  GENERAL: NAD,  HEAD:  NCAT, EOMI, MM  NECK: Supple, Left  neck swelling Non Tender, No Erythema   NERVOUS SYSTEM:  AAOx3, NFD  CHEST/LUNG: +bs b/l, No wheezing   HEART: +s1s2 RRR  ABDOMEN: soft, NT/ND  EXTREMITIES:  pp, no edema  SKIN: age related skin changes     Pencilling Allergy : many years ago  Experienced Hives    IMPRESSION  Suspected Left  SSTI Neck Cellulitis   Left  Neck  Swelling Associated  with indistinct hypodensity with local mass  Left submandibular gland suspicious for infectious Process  Hemodynamically Stable  to Night of  airway compromise  Non Septic on Admission   Patient Has Penicillin Allergy   Continue clindamycin   Send Bld Cultures ESR CRP, HIV  F/u ID  and Official ENT  recs       Mild Transaminitis   - AST 61, ALT 51  - no abdominal pain  - trend LFTs   - RUQ if  worsening   - avoid hepatotoxic meds    Hx HTN  - c/w Amlodipine 5mg qd    Hx  HLD  - c/w statin ( hold if  worsening  LFTs)    Hx  Hypothyroidism  - c/w Synthroid 25 mcg qd    C[place]de on 04/28

## 2024-04-28 NOTE — CONSULT NOTE ADULT - ASSESSMENT
Pt is a 62yo Male with PMH including HTN, HLD, COPD who presents to the hospital with worsening left neck swelling. ENT consulted for prelim CT read w/ indistinct hypodensity adjacent to L SMG.    PLAN:  -F/u final CT read  -IV abx - s/p clinda in ED  -Decadron 10mg x 1 then 6mg q8hr x 2 more doses  -Warm compresses to area  -Sialogogues  -Soft diet as tolerated  -Will d/w attending

## 2024-04-28 NOTE — H&P ADULT - NSHPPHYSICALEXAM_GEN_ALL_CORE
GENERAL: NAD, lying in bed comfortably  ENT: +trismus  NECK: L sided neck edema  CHEST/LUNG: CTAB  HEART: RRR no MRG  ABDOMEN: BSx4; Soft, nontender, nondistended  EXTREMITIES: No clubbing, cyanosis, or edema  NERVOUS SYSTEM:  A&Ox3, no focal deficits   SKIN: No rashes or lesions

## 2024-04-28 NOTE — ED PROVIDER NOTE - CONSIDERATION OF ADMISSION OBSERVATION
Consideration of Admission/Observation admission considered due to increasing neck swelling unable to tolerate PO secondary to sx.

## 2024-04-28 NOTE — H&P ADULT - NSHPLABSRESULTS_GEN_ALL_CORE
LABS:                        16.2   11.78 )-----------( 191      ( 28 Apr 2024 15:27 )             47.4     04-28    139  |  100  |  18  ----------------------------<  146<H>  5.0   |  25  |  1.0    Ca    9.7      28 Apr 2024 15:27    TPro  6.8  /  Alb  4.1  /  TBili  0.8  /  DBili  x   /  AST  61<H>  /  ALT  51<H>  /  AlkPhos  94  04-28        < from: CT Neck Soft Tissue w/ IV Cont (04.28.24 @ 16:00) >    Impression:  Indistinct hypodensity with local mass effect measuring approximately 2.5   x 2.1 adjacent to the left submandibular gland suspicious for infectious   process without distinct drainable fluid collection.        ******PRELIMINARY REPORT******      < end of copied text >

## 2024-04-28 NOTE — ED PROVIDER NOTE - ATTENDING CONTRIBUTION TO CARE
63-year-old with left neck and facial swelling for the past 3 days despite being on Z-Preston for positive strep test.  He is able to tolerate p.o. but with pain with swallowing.  His voice sounds slightly muffled.  Denies any fevers.  On exam he has significant trismus.  The tongue is normal in size.  Unable to visualize soft palate.  Neck otherwise appears swollen but he has normal range of motion of the neck.  There is no crepitus.  Lungs and heart sounds are normal.  Plan is to obtain CT of the neck, steroids and lab work

## 2024-04-28 NOTE — ED PROVIDER NOTE - RHYTHM STRIP/ULTRASOUND IMAGES/CT SCAN IMAGES SHOWED
Independent interpretation of the CT scan as a preliminary reading by Dr. Misbah Quigley shows Independent interpretation of the CT scan as a preliminary reading by Dr. Misbah Quigley shows left submandibular swelling no abscess

## 2024-04-28 NOTE — ED PROVIDER NOTE - CLINICAL SUMMARY MEDICAL DECISION MAKING FREE TEXT BOX
Patient admitted for neck swelling found to have cellulitis without abscess given amount of trismus ENT consulted patient started on IV antibiotics and admitted

## 2024-04-28 NOTE — H&P ADULT - ASSESSMENT
63 year old male PMHx HTN, HLD, COPD, Hypothyroidism, presents to the ED for left neck swelling  He went to urgent care yesterday and was started on Azithro for strep throat    T 99.3, HR 91, /94, RR 18, 99% on RA  WBC 12, AST 61, ALT 51, lactate 1.3  CT neck prelim: Indistinct hypodensity with local mass effect measuring approximately 2.5 x 2.1 adjacent to the left submandibular gland suspicious for infectious process without distinct drainable fluid collection.  Received Decadron and Clindamycin  Admit to Medicine     # Left neck cellulitis     # HTN    # HLD    # COPD    # Hypothyroidism     #DVT ppx:  #GI ppx:  #Diet:   #Activity:  #Dispo:  63 year old male PMHx HTN, HLD, COPD, Hypothyroidism, presents to the ED for left neck swelling after putting in dentures that don't fit well. He went to urgent care yesterday and was started on Azithro for strep throat. CT neck prelim showing infectious process without distinct drainable fluid collection. Admit to Medicine.    # Left neck cellulitis   - no sepsis poa  - WBC 12  - CT neck prelim: Indistinct hypodensity with local mass effect measuring approximately 2.5 x 2.1 adjacent to the left submandibular gland suspicious for infectious process without distinct drainable fluid collection.  - f/u CT official read  - c/w Clindamycin  - c/w Decadron x3 doses  - c/w PPI    # Transaminitis, mild  - AST 61, ALT 51  - no abdominal pain  - monitor BMP  - avoid hepatotoxic meds    # HTN  - c/w Amlodipine 5mg qd    # HLD  - c/w statin    # Hypothyroidism   - c/w Synthroid 25 mcg qd    #DVT ppx: Lovenox  #GI ppx: PPI  #Diet: as tolerated  #Activity: AAT  #Dispo: Med

## 2024-04-28 NOTE — ED PROVIDER NOTE - PROGRESS NOTE DETAILS
Cl Kaufman, DO: prelim CT Indistinct hypodensity with local mass effect measuring approximately 2.5   x 2.1 adjacent to the left submandibular gland suspicious for infectious process without distinct drainable fluid collection. ENT consulted. Cl Kaufman, DO: given increasing neck swelling with difficulty opening mouth and taking PO, discussed with patient and agreed for admission for IV abx and further management. Patient to be admitted to an inpatient floor. Case discussed with and care endorsed to medical admitting resident.

## 2024-04-28 NOTE — ED PROVIDER NOTE - DIFFERENTIAL DIAGNOSIS
[facility] Cardiology Note    [facility] called overhead for Mr Duane C Johnson     Evaluated the patient at bedside, compressions in progress, and s/p DCCV for reported VF on initial rhythm check. ROSC was achieved after 1 round of compressions and 1x DCCV. The patient was moving, confused but responsive, advanced airway was not placed.     EKG demonstrated anterior ST elevations. On comparison to EKG from 0830 today, elevations more pronounced. Notably, patient had STEMI on 10/26 at 0015 and is s/p PCI to the LAD. ST elevations DID NOT completely resolve on post-PCI EKG. He received all doses of ASA and Ticagrelor.     Given VT arrest with post-ROSC runs of non-sustained polymorphic VT and ST-elevations worse than prior at 0830, emergent cardiac catheterization laboratory activation was requested for evaluation of stent thrombosis.     Juan Diego Weaver MD [facility], PGY-5  Fellow, Cardiovascular Disease      VT initiation for second code event:     Differential Diagnosis Peritonsillar abscess, cellulitis

## 2024-04-28 NOTE — ED PROVIDER NOTE - OBJECTIVE STATEMENT
63-year-old male with past medical history of hypertension, hyperlipidemia, COPD, hypothyroidism, who presents for worsening left-sided neck swelling over the past week.  States that he has partial dentures which he placed 1 week ago and felt like he may have caused minor trauma to the area.  Since then his neck has been increasing in size now going from the left to the right.  Having significant difficulty opening his mouth secondary symptoms.  In addition he was seen outpatient a couple days ago which he reportedly was positive for strep and was started on azithromycin, although unimproved with antibiotics so far.  Reports low-grade fever, denies any nausea/vomiting, shortness of breath, chest pain.  Has been having difficulty taking p.o. secondary to symptoms.

## 2024-04-29 LAB
ALBUMIN SERPL ELPH-MCNC: 3.9 G/DL — SIGNIFICANT CHANGE UP (ref 3.5–5.2)
ALP SERPL-CCNC: 89 U/L — SIGNIFICANT CHANGE UP (ref 30–115)
ALT FLD-CCNC: 73 U/L — HIGH (ref 0–41)
ANION GAP SERPL CALC-SCNC: 14 MMOL/L — SIGNIFICANT CHANGE UP (ref 7–14)
AST SERPL-CCNC: 66 U/L — HIGH (ref 0–41)
BASOPHILS # BLD AUTO: 0.01 K/UL — SIGNIFICANT CHANGE UP (ref 0–0.2)
BASOPHILS NFR BLD AUTO: 0.1 % — SIGNIFICANT CHANGE UP (ref 0–1)
BILIRUB SERPL-MCNC: 0.5 MG/DL — SIGNIFICANT CHANGE UP (ref 0.2–1.2)
BUN SERPL-MCNC: 21 MG/DL — HIGH (ref 10–20)
CALCIUM SERPL-MCNC: 9.6 MG/DL — SIGNIFICANT CHANGE UP (ref 8.4–10.5)
CHLORIDE SERPL-SCNC: 102 MMOL/L — SIGNIFICANT CHANGE UP (ref 98–110)
CO2 SERPL-SCNC: 25 MMOL/L — SIGNIFICANT CHANGE UP (ref 17–32)
CREAT SERPL-MCNC: 0.9 MG/DL — SIGNIFICANT CHANGE UP (ref 0.7–1.5)
CRP SERPL-MCNC: 174.6 MG/L — HIGH
EGFR: 96 ML/MIN/1.73M2 — SIGNIFICANT CHANGE UP
EOSINOPHIL # BLD AUTO: 0 K/UL — SIGNIFICANT CHANGE UP (ref 0–0.7)
EOSINOPHIL NFR BLD AUTO: 0 % — SIGNIFICANT CHANGE UP (ref 0–8)
ERYTHROCYTE [SEDIMENTATION RATE] IN BLOOD: 73 MM/HR — HIGH (ref 0–10)
GLUCOSE BLDC GLUCOMTR-MCNC: 126 MG/DL — HIGH (ref 70–99)
GLUCOSE SERPL-MCNC: 165 MG/DL — HIGH (ref 70–99)
HCT VFR BLD CALC: 45 % — SIGNIFICANT CHANGE UP (ref 42–52)
HGB BLD-MCNC: 15.5 G/DL — SIGNIFICANT CHANGE UP (ref 14–18)
IMM GRANULOCYTES NFR BLD AUTO: 0.3 % — SIGNIFICANT CHANGE UP (ref 0.1–0.3)
LYMPHOCYTES # BLD AUTO: 2.73 K/UL — SIGNIFICANT CHANGE UP (ref 1.2–3.4)
LYMPHOCYTES # BLD AUTO: 26.2 % — SIGNIFICANT CHANGE UP (ref 20.5–51.1)
MAGNESIUM SERPL-MCNC: 2.3 MG/DL — SIGNIFICANT CHANGE UP (ref 1.8–2.4)
MCHC RBC-ENTMCNC: 33 PG — HIGH (ref 27–31)
MCHC RBC-ENTMCNC: 34.4 G/DL — SIGNIFICANT CHANGE UP (ref 32–37)
MCV RBC AUTO: 95.9 FL — HIGH (ref 80–94)
MONOCYTES # BLD AUTO: 0.32 K/UL — SIGNIFICANT CHANGE UP (ref 0.1–0.6)
MONOCYTES NFR BLD AUTO: 3.1 % — SIGNIFICANT CHANGE UP (ref 1.7–9.3)
NEUTROPHILS # BLD AUTO: 7.32 K/UL — HIGH (ref 1.4–6.5)
NEUTROPHILS NFR BLD AUTO: 70.3 % — SIGNIFICANT CHANGE UP (ref 42.2–75.2)
NRBC # BLD: 0 /100 WBCS — SIGNIFICANT CHANGE UP (ref 0–0)
PLATELET # BLD AUTO: 210 K/UL — SIGNIFICANT CHANGE UP (ref 130–400)
PMV BLD: 10.5 FL — HIGH (ref 7.4–10.4)
POTASSIUM SERPL-MCNC: 4.6 MMOL/L — SIGNIFICANT CHANGE UP (ref 3.5–5)
POTASSIUM SERPL-SCNC: 4.6 MMOL/L — SIGNIFICANT CHANGE UP (ref 3.5–5)
PROT SERPL-MCNC: 6.7 G/DL — SIGNIFICANT CHANGE UP (ref 6–8)
RBC # BLD: 4.69 M/UL — LOW (ref 4.7–6.1)
RBC # FLD: 13.5 % — SIGNIFICANT CHANGE UP (ref 11.5–14.5)
SODIUM SERPL-SCNC: 141 MMOL/L — SIGNIFICANT CHANGE UP (ref 135–146)
WBC # BLD: 10.41 K/UL — SIGNIFICANT CHANGE UP (ref 4.8–10.8)
WBC # FLD AUTO: 10.41 K/UL — SIGNIFICANT CHANGE UP (ref 4.8–10.8)

## 2024-04-29 PROCEDURE — 99232 SBSQ HOSP IP/OBS MODERATE 35: CPT

## 2024-04-29 RX ORDER — IBUPROFEN 200 MG
400 TABLET ORAL EVERY 6 HOURS
Refills: 0 | Status: COMPLETED | OUTPATIENT
Start: 2024-04-29 | End: 2024-05-01

## 2024-04-29 RX ORDER — MORPHINE SULFATE 50 MG/1
1 CAPSULE, EXTENDED RELEASE ORAL ONCE
Refills: 0 | Status: DISCONTINUED | OUTPATIENT
Start: 2024-04-29 | End: 2024-04-29

## 2024-04-29 RX ADMIN — Medication 100 MILLIGRAM(S): at 17:41

## 2024-04-29 RX ADMIN — PANTOPRAZOLE SODIUM 40 MILLIGRAM(S): 20 TABLET, DELAYED RELEASE ORAL at 05:41

## 2024-04-29 RX ADMIN — ATORVASTATIN CALCIUM 40 MILLIGRAM(S): 80 TABLET, FILM COATED ORAL at 21:16

## 2024-04-29 RX ADMIN — Medication 400 MILLIGRAM(S): at 16:20

## 2024-04-29 RX ADMIN — Medication 25 MICROGRAM(S): at 05:41

## 2024-04-29 RX ADMIN — Medication 81 MILLIGRAM(S): at 13:06

## 2024-04-29 RX ADMIN — Medication 6 MILLIGRAM(S): at 05:41

## 2024-04-29 RX ADMIN — GABAPENTIN 100 MILLIGRAM(S): 400 CAPSULE ORAL at 05:41

## 2024-04-29 RX ADMIN — Medication 100 MILLIGRAM(S): at 13:41

## 2024-04-29 RX ADMIN — GABAPENTIN 100 MILLIGRAM(S): 400 CAPSULE ORAL at 21:16

## 2024-04-29 RX ADMIN — Medication 650 MILLIGRAM(S): at 13:05

## 2024-04-29 RX ADMIN — Medication 100 MILLIGRAM(S): at 05:42

## 2024-04-29 RX ADMIN — AMLODIPINE BESYLATE 5 MILLIGRAM(S): 2.5 TABLET ORAL at 05:41

## 2024-04-29 RX ADMIN — Medication 650 MILLIGRAM(S): at 22:23

## 2024-04-29 RX ADMIN — Medication 400 MILLIGRAM(S): at 17:20

## 2024-04-29 RX ADMIN — Medication 650 MILLIGRAM(S): at 14:05

## 2024-04-29 RX ADMIN — GABAPENTIN 100 MILLIGRAM(S): 400 CAPSULE ORAL at 13:06

## 2024-04-29 NOTE — PATIENT PROFILE ADULT - FALL HARM RISK - RISK INTERVENTIONS
Assistance OOB with selected safe patient handling equipment/Assistance with ambulation/Communicate Fall Risk and Risk Factors to all staff, patient, and family/Reinforce activity limits and safety measures with patient and family/Review medications for side effects contributing to fall risk/Sit up slowly, dangle for a short time, stand at bedside before walking/Toileting schedule using arm’s reach rule for commode and bathroom/Visual Cue: Yellow wristband/Bed in lowest position, wheels locked, appropriate side rails in place/Call bell, personal items and telephone in reach/Instruct patient to call for assistance before getting out of bed or chair/Non-slip footwear when patient is out of bed/[place] to call system/Physically safe environment - no spills, clutter or unnecessary equipment/Purposeful Proactive Rounding/Room/bathroom lighting operational, light cord in reach

## 2024-04-29 NOTE — CONSULT NOTE ADULT - ASSESSMENT
Patient is a 63y old  Male who presents with a chief complaint of "My dentures do not really fit, so when I put my dentures in I think it damaged my back tooth. I started having neck pain and swelling and I think it is coming from my teeth in the back."    HPI:  63 year old male PMHx HTN, HLD, COPD, Hypothyroidism, presents to the ED for left neck swelling. Pt tried putting his dentures in that he has not worn for a long time. He reports pain and "nerve damage" since putting them in 1 week ago. 2 days ago he developed a sore throat and low grade fevers so he went to urgent care and was started on Azithro for strep throat. Pt reports difficulty opening his jaw and eating. He also reports worsening swelling on the left side of his neck so he came to the ED. Denies chest pain, SOB, dysphagia, N/V/D.    T 99.3, HR 91, /94, RR 18, 99% on RA  WBC 12, AST 61, ALT 51, lactate 1.3  CT neck prelim: Indistinct hypodensity with local mass effect measuring approximately 2.5 x 2.1 adjacent to the left submandibular gland suspicious for infectious process without distinct drainable fluid collection.  Received Decadron and Clindamycin  Admit to Medicine    (28 Apr 2024 21:03)      PAST MEDICAL & SURGICAL HISTORY:  HTN (hypertension)      HLD (hyperlipidemia)      Goiter      Emphysema/COPD  "mild" per pt      OA (osteoarthritis)      Back pain      Anxiety      Gallstones      COPD, mild      Hypothyroidism      H/O bilateral hip replacements      S/P inguinal hernia repair        ( - ) heart valve replacement  ( - ) joint replacement  ( - ) pregnancy    MEDICATIONS  (STANDING):  amLODIPine   Tablet 5 milliGRAM(s) Oral daily  aspirin  chewable 81 milliGRAM(s) Oral daily  atorvastatin 40 milliGRAM(s) Oral at bedtime  clindamycin IVPB 600 milliGRAM(s) IV Intermittent every 6 hours  enoxaparin Injectable 40 milliGRAM(s) SubCutaneous every 24 hours  gabapentin 100 milliGRAM(s) Oral three times a day  levothyroxine 25 MICROGram(s) Oral daily  pantoprazole    Tablet 40 milliGRAM(s) Oral before breakfast    MEDICATIONS  (PRN):  acetaminophen     Tablet .. 650 milliGRAM(s) Oral every 6 hours PRN Temp greater or equal to 38C (100.4F), Mild Pain (1 - 3)  methocarbamol 750 milliGRAM(s) Oral every 8 hours PRN Muscle Spasm      Allergies    penicillin (Hives)    Intolerances      *Last Dental Visit: March    Vital Signs Last 24 Hrs  T(C): 36.9 (29 Apr 2024 07:57), Max: 37.7 (28 Apr 2024 17:25)  T(F): 98.4 (29 Apr 2024 07:57), Max: 99.9 (28 Apr 2024 17:25)  HR: 65 (29 Apr 2024 07:57) (63 - 91)  BP: 119/71 (29 Apr 2024 07:57) (115/66 - 137/94)  BP(mean): 90 (29 Apr 2024 07:57) (90 - 90)  RR: 18 (29 Apr 2024 07:57) (18 - 18)  SpO2: 95% (29 Apr 2024 07:57) (95% - 99%)    Parameters below as of 29 Apr 2024 07:57  Patient On (Oxygen Delivery Method): room air        LABS:                        15.5   10.41 )-----------( 210      ( 29 Apr 2024 06:35 )             45.0     04-29    141  |  102  |  21<H>  ----------------------------<  165<H>  4.6   |  25  |  0.9    Ca    9.6      29 Apr 2024 06:35  Mg     2.3     04-29    TPro  6.7  /  Alb  3.9  /  TBili  0.5  /  DBili  x   /  AST  66<H>  /  ALT  73<H>  /  AlkPhos  89  04-29    WBC Count: 10.41 K/uL [4.80 - 10.80] (04-29 @ 06:35)  Platelet Count - Automated: 210 K/uL [130 - 400] (04-29 @ 06:35)  WBC Count: 11.78 K/uL *H* [4.80 - 10.80] (04-28 @ 15:27)  Platelet Count - Automated: 191 K/uL [130 - 400] (04-28 @ 15:27)    Urinalysis Basic - ( 29 Apr 2024 06:35 )    Color: x / Appearance: x / SG: x / pH: x  Gluc: 165 mg/dL / Ketone: x  / Bili: x / Urobili: x   Blood: x / Protein: x / Nitrite: x   Leuk Esterase: x / RBC: x / WBC x   Sq Epi: x / Non Sq Epi: x / Bacteria: x          EOE:  TMJ ( - ) clicks                     ( - ) pops                     ( - ) crepitus             Mandible <<FROM>>             Facial bones and MOM <<grossly intact>>             ( - ) trismus             ( - ) lymphadenopathy             ( + ) swelling             ( + ) asymmetry             ( - ) palpation             ( - ) dyspnea             ( - ) dysphagia             ( - ) loss of consciousness    IOE:  permanent dentition: multiple restorations,            hard/soft palate: <<No pathology noted>>           tongue/FOM <<No pathology noted>>           labial/buccal mucosa <<No pathology noted>>           ( - ) percussion           ( - ) palpation           ( - ) swelling            ( - ) abscess           ( - ) sinus tract    Dentition present: #17         *DENTAL RADIOGRAPHS: None taken    RADIOLOGY & ADDITIONAL STUDIES:    *ASSESSMENT:       *PLAN: No emergent dental care needed at this time. Administer anesthetic to alleviate symptoms. Continue analgesics as necessary for the pain.    PROCEDURE:   Verbal and written consent given.  Anesthesia: 1 carpule of 2% Lidocaine (1:100,000 epinephrine) via local infiltration; 1 carpule of 0.5% Bupivacaine (1:200,000 epinephrine) via inferior alveolar nerve block  Treatment: Limited Oral Examination    RECOMMENDATIONS:  1) Complete antibiotic course as prescribed and analgesics as necessary for pain  2) Dental F/U with outpatient dentist for comprehensive dental care or [facility] dental clinic   3) If any difficulty swallowing/breathing, fever occur, return to ER.     Resident Name: Betty Ghosh, pager #[phone] Patient is a 63y old  Male who presents with a chief complaint of "My dentures do not really fit, so when I put my dentures in I think it damaged my back tooth. I started having neck pain and swelling and I think it is coming from my teeth in the back."    HPI:  63 year old male PMHx HTN, HLD, COPD, Hypothyroidism, presents to the ED for left neck swelling. Pt tried putting his dentures in that he has not worn for a long time. He reports pain and "nerve damage" since putting them in 1 week ago. 2 days ago he developed a sore throat and low grade fevers so he went to urgent care and was started on Azithro for strep throat. Pt reports difficulty opening his jaw and eating. He also reports worsening swelling on the left side of his neck so he came to the ED. Denies chest pain, SOB, dysphagia, N/V/D.    T 99.3, HR 91, /94, RR 18, 99% on RA  WBC 12, AST 61, ALT 51, lactate 1.3  CT neck prelim: Indistinct hypodensity with local mass effect measuring approximately 2.5 x 2.1 adjacent to the left submandibular gland suspicious for infectious process without distinct drainable fluid collection.  Received Decadron and Clindamycin  Admit to Medicine    (28 Apr 2024 21:03)      PAST MEDICAL & SURGICAL HISTORY:  HTN (hypertension)      HLD (hyperlipidemia)      Goiter      Emphysema/COPD  "mild" per pt      OA (osteoarthritis)      Back pain      Anxiety      Gallstones      COPD, mild      Hypothyroidism      H/O bilateral hip replacements      S/P inguinal hernia repair        ( - ) heart valve replacement  ( - ) joint replacement  ( - ) pregnancy    MEDICATIONS  (STANDING):  amLODIPine   Tablet 5 milliGRAM(s) Oral daily  aspirin  chewable 81 milliGRAM(s) Oral daily  atorvastatin 40 milliGRAM(s) Oral at bedtime  clindamycin IVPB 600 milliGRAM(s) IV Intermittent every 6 hours  enoxaparin Injectable 40 milliGRAM(s) SubCutaneous every 24 hours  gabapentin 100 milliGRAM(s) Oral three times a day  levothyroxine 25 MICROGram(s) Oral daily  pantoprazole    Tablet 40 milliGRAM(s) Oral before breakfast    MEDICATIONS  (PRN):  acetaminophen     Tablet .. 650 milliGRAM(s) Oral every 6 hours PRN Temp greater or equal to 38C (100.4F), Mild Pain (1 - 3)  methocarbamol 750 milliGRAM(s) Oral every 8 hours PRN Muscle Spasm      Allergies    penicillin (Hives)    Intolerances      *Last Dental Visit: March    Vital Signs Last 24 Hrs  T(C): 36.9 (29 Apr 2024 07:57), Max: 37.7 (28 Apr 2024 17:25)  T(F): 98.4 (29 Apr 2024 07:57), Max: 99.9 (28 Apr 2024 17:25)  HR: 65 (29 Apr 2024 07:57) (63 - 91)  BP: 119/71 (29 Apr 2024 07:57) (115/66 - 137/94)  BP(mean): 90 (29 Apr 2024 07:57) (90 - 90)  RR: 18 (29 Apr 2024 07:57) (18 - 18)  SpO2: 95% (29 Apr 2024 07:57) (95% - 99%)    Parameters below as of 29 Apr 2024 07:57  Patient On (Oxygen Delivery Method): room air        LABS:                        15.5   10.41 )-----------( 210      ( 29 Apr 2024 06:35 )             45.0     04-29    141  |  102  |  21<H>  ----------------------------<  165<H>  4.6   |  25  |  0.9    Ca    9.6      29 Apr 2024 06:35  Mg     2.3     04-29    TPro  6.7  /  Alb  3.9  /  TBili  0.5  /  DBili  x   /  AST  66<H>  /  ALT  73<H>  /  AlkPhos  89  04-29    WBC Count: 10.41 K/uL [4.80 - 10.80] (04-29 @ 06:35)  Platelet Count - Automated: 210 K/uL [130 - 400] (04-29 @ 06:35)  WBC Count: 11.78 K/uL *H* [4.80 - 10.80] (04-28 @ 15:27)  Platelet Count - Automated: 191 K/uL [130 - 400] (04-28 @ 15:27)    Urinalysis Basic - ( 29 Apr 2024 06:35 )    Color: x / Appearance: x / SG: x / pH: x  Gluc: 165 mg/dL / Ketone: x  / Bili: x / Urobili: x   Blood: x / Protein: x / Nitrite: x   Leuk Esterase: x / RBC: x / WBC x   Sq Epi: x / Non Sq Epi: x / Bacteria: x          EOE:  TMJ ( - ) clicks                     ( - ) pops                     ( - ) crepitus             Mandible <<FROM>>             Facial bones and MOM <<grossly intact>>             ( - ) trismus             ( - ) lymphadenopathy             ( - ) swelling             ( - ) asymmetry             ( - ) palpation             ( - ) dyspnea             ( - ) dysphagia             ( - ) loss of consciousness    IOE:  permanent dentition: multiple restorations,            hard/soft palate: <<No pathology noted>>           tongue/FOM <<No pathology noted>>           labial/buccal mucosa <<No pathology noted>>           ( + ) percussion           ( - ) palpation           ( - ) swelling            ( - ) abscess           ( - ) sinus tract    Dentition present: #17 grade 2 mobility        *DENTAL RADIOGRAPHS: None taken    RADIOLOGY & ADDITIONAL STUDIES:    *ASSESSMENT: Non-restorable #17     PROCEDURE:   Verbal and written consent given.  Anesthesia: 1 carpule of 2% Lidocaine (1:100,000 epinephrine) via inferior alveolar nerve block and 1 carpule of 4% septocaine (1:100,000 epinephrine) via local infiltration  Treatment: Non-surgical extraction of #17 completed. Explored area thoroughly, purulent material expressed. Gauze hemostasis achieved. Post op x ray taken. Post op instructions reviewed.     RECOMMENDATIONS:  1) Complete antibiotic course as prescribed and analgesics as necessary for pain  2) Dental F/U with outpatient dentist for comprehensive dental care or [facility] dental clinic   3) If any difficulty swallowing/breathing, fever occur, return to ER.     Resident Name: Betty Ghosh, pager #[phone]

## 2024-04-29 NOTE — PROGRESS NOTE ADULT - ASSESSMENT
Pt is a 63y Male a/w Left neck cellulitis- seen and examined at bedside with Dr. Woods.    Plan:   - F/u Final CT read   - Continue IV abx - currently on Clindamycin   - Continue w/ PPI   - Recommend Dental Consult   - If patient's swelling does not improve consider IR c/s for FNA   - Seen and examined at bedside with Dr. Woods  Pt is a 63y Male a/w Left neck cellulitis- seen and examined at bedside with Dr. Woods.    Plan:   - F/u Final CT read   - Continue IV abx - currently on Clindamycin   - ID c/s for further recs   - Continue w/ PPI   -  Dental Consult   -  IR c/s for FNA of possible abscess   - Seen and examined at bedside with Dr. Woods     < from: CT Neck Soft Tissue w/ IV Cont (04.28.24 @ 16:00) >  ATTENDING ADDENDUM / FINAL IMPRESSION:    1. Rim-enhancing fluid collection along the inner margin of the left   mandibular body measuring 1 x 2 x 2 cm (TRV, AP, CC; ser 3 im 70)   consistent with abscess. Extensive associated soft tissue swelling   including:    -Left  space edema and expansion.  -Left submandibular gland edema.  -Phlegmon within the submandibular space with deviation of the floor of   mouth structures to the right.  -Left oropharyngeal, supraglottic laryngeal and piriform sinus edema. The   left pyriform sinus is completely effaced.  -The uvula is edematous.    2. Multiple reactive appearing left 1B and 2 lymph nodes.    3. Left mandibular and maxillary dental caries and periapical lucencies.   Recommend correlation with dental exam.    4. Absent left thyroid lobe. Right thyroid lobe nodule measuring 2 cm,   recommend thyroid sonogram for further evaluation.    Summary of updated findings discussed with Dr. Smallwood 8:35 am 2/29/24    --- End of Report ---    VIKY MULLINS MD; Resident Radiologist  This document has been electronically signed.  LAUREN KENT MD; Attending Radiologist  This document has been electronically signed. Apr 29 2024  8:40AM    < end of copied text >   Pt is a 63y Male a/w Left neck cellulitis- seen and examined at bedside with Dr. Woods.    Plan:   - F/u Final CT read   - Continue IV abx - currently on Clindamycin   - ID c/s for further recs   - Continue w/ PPI   -  Dental Consult   -  IR c/s for FNA of possible abscess   - Seen and examined at bedside with Dr. Woods     ADDENDUM:   < from: CT Neck Soft Tissue w/ IV Cont (04.28.24 @ 16:00) >  ATTENDING ADDENDUM / FINAL IMPRESSION:    1. Rim-enhancing fluid collection along the inner margin of the left   mandibular body measuring 1 x 2 x 2 cm (TRV, AP, CC; ser 3 im 70)   consistent with abscess. Extensive associated soft tissue swelling   including:    -Left  space edema and expansion.  -Left submandibular gland edema.  -Phlegmon within the submandibular space with deviation of the floor of   mouth structures to the right.  -Left oropharyngeal, supraglottic laryngeal and piriform sinus edema. The   left pyriform sinus is completely effaced.  -The uvula is edematous.    2. Multiple reactive appearing left 1B and 2 lymph nodes.    3. Left mandibular and maxillary dental caries and periapical lucencies.   Recommend correlation with dental exam.    4. Absent left thyroid lobe. Right thyroid lobe nodule measuring 2 cm,   recommend thyroid sonogram for further evaluation.    Summary of updated findings discussed with Dr. Smallwood 8:35 am 2/29/24    --- End of Report ---    VIKY MULLINS MD; Resident Radiologist  This document has been electronically signed.  LAUREN KENT MD; Attending Radiologist  This document has been electronically signed. Apr 29 2024  8:40AM    < end of copied text >

## 2024-04-29 NOTE — PROGRESS NOTE ADULT - SUBJECTIVE AND OBJECTIVE BOX
HEATHERLIGIA  63y Male    INTERVAL HPI/OVERNIGHT EVENTS:    pt seen this morning on rounds  he felt better with less neck swelling  still unable to open his mouth fully for exam  now s/p tooth extraction with expression of pus    T(F): 99.8 (04-29-24 @ 15:55), Max: 99.8 (04-29-24 @ 15:55)  HR: 107 (04-29-24 @ 15:55) (63 - 107)  BP: 138/70 (04-29-24 @ 15:55) (115/66 - 138/70)  RR: 18 (04-29-24 @ 15:55) (18 - 18)  SpO2: 94% (04-29-24 @ 15:55) (94% - 96%) on RA    POCT Blood Glucose.: 126 mg/dL (29 Apr 2024 13:44)    PHYSICAL EXAM:  GENERAL: NAD  HEAD:  Normocephalic  EYES:  conjunctiva and sclera clear  ENMT: Moist mucous membranes, unable to open mouth fully, + edematous uvula partially seen  left lower facial and neck swelling  NERVOUS SYSTEM:  Alert, awake, Good concentration  CHEST/LUNG: CTA b/l; No rales, rhonchi, wheezing  HEART: Regular rate and rhythm  ABDOMEN: Soft, Nontender, Nondistended  EXTREMITIES:   No edema LE  SKIN: warm, dry    Consultant(s) Notes Reviewed:  [x ] YES  [ ] NO  Care Discussed with Consultants/Other Providers [ x] YES  [ ] NO    MEDICATIONS  (STANDING):  amLODIPine   Tablet 5 milliGRAM(s) Oral daily  aspirin  chewable 81 milliGRAM(s) Oral daily  atorvastatin 40 milliGRAM(s) Oral at bedtime  clindamycin IVPB 600 milliGRAM(s) IV Intermittent every 6 hours  enoxaparin Injectable 40 milliGRAM(s) SubCutaneous every 24 hours  gabapentin 100 milliGRAM(s) Oral three times a day  levothyroxine 25 MICROGram(s) Oral daily  pantoprazole    Tablet 40 milliGRAM(s) Oral before breakfast    MEDICATIONS  (PRN):  acetaminophen     Tablet .. 650 milliGRAM(s) Oral every 6 hours PRN Temp greater or equal to 38C (100.4F), Mild Pain (1 - 3)  ibuprofen  Tablet. 400 milliGRAM(s) Oral every 6 hours PRN Mild Pain (1 - 3)  methocarbamol 750 milliGRAM(s) Oral every 8 hours PRN Muscle Spasm      LABS:                        15.5   10.41 )-----------( 210      ( 29 Apr 2024 06:35 )             45.0     04-29    141  |  102  |  21<H>  ----------------------------<  165<H>  4.6   |  25  |  0.9    Ca    9.6      29 Apr 2024 06:35  Mg     2.3     04-29    TPro  6.7  /  Alb  3.9  /  TBili  0.5  /  DBili  x   /  AST  66<H>  /  ALT  73<H>  /  AlkPhos  89  04-29        Lactate, Blood: 1.3 mmol/L (04-28 @ 15:27)          RADIOLOGY & ADDITIONAL TESTS:    Imaging or report Personally Reviewed:  [x ] YES  [ ] NO    < from: CT Neck Soft Tissue w/ IV Cont (04.28.24 @ 16:00) >  1. Rim-enhancing fluid collection along the inner margin of the left   mandibular body measuring 1 x 2 x 2 cm (TRV, AP, CC; ser 3 im 70)   consistent with abscess. Extensive associated soft tissue swelling   including:    -Left  space edema and expansion.  -Left submandibular gland edema.  -Phlegmon within the submandibular space with deviation of the floor of   mouth structures to the right.  -Left oropharyngeal, supraglottic laryngeal and piriform sinus edema. The   left pyriform sinus is completely effaced.  -The uvula is edematous.    2. Multiple reactive appearing left 1B and 2 lymph nodes.    3. Left mandibular and maxillary dental caries and periapical lucencies.   Recommend correlation with dental exam.    4. Absent left thyroid lobe. Right thyroid lobe nodule measuring 2 cm,   recommend thyroid sonogram for further evaluation.    < end of copied text >              Case discussed with residents on rounds today    Care discussed with pt

## 2024-04-29 NOTE — PROGRESS NOTE ADULT - SUBJECTIVE AND OBJECTIVE BOX
ENT DAILY PROGRESS NOTE    Pt is a 63y Male a/w Left neck cellulitis- seen and examined at bedside with Dr. Woods. Patient reports some improvement of symptoms this AM. Patient reports he is able to open his mouth and able to consume PO liquids. Denies any fever, chills, SOB/difficulty breathing, odynphagia.       REVIEW OF SYSTEMS   [x] A ten-point review of systems was otherwise negative except as noted.  [ ] Due to altered mental status/intubation, subjective information were not able to be obtained from patient. History was obtained, to the extent possible, from review of the chart and collateral sources of information.    Allergies    penicillin (Hives)    Intolerances        MEDICATIONS:  acetaminophen     Tablet .. 650 milliGRAM(s) Oral every 6 hours PRN  amLODIPine   Tablet 5 milliGRAM(s) Oral daily  aspirin  chewable 81 milliGRAM(s) Oral daily  atorvastatin 40 milliGRAM(s) Oral at bedtime  clindamycin IVPB 600 milliGRAM(s) IV Intermittent every 6 hours  enoxaparin Injectable 40 milliGRAM(s) SubCutaneous every 24 hours  gabapentin 100 milliGRAM(s) Oral three times a day  levothyroxine 25 MICROGram(s) Oral daily  methocarbamol 750 milliGRAM(s) Oral every 8 hours PRN  pantoprazole    Tablet 40 milliGRAM(s) Oral before breakfast      Vital Signs Last 24 Hrs  T(C): 36.9 (29 Apr 2024 07:57), Max: 37.7 (28 Apr 2024 17:25)  T(F): 98.4 (29 Apr 2024 07:57), Max: 99.9 (28 Apr 2024 17:25)  HR: 65 (29 Apr 2024 07:57) (63 - 91)  BP: 119/71 (29 Apr 2024 07:57) (115/66 - 137/94)  BP(mean): 90 (29 Apr 2024 07:57) (90 - 90)  RR: 18 (29 Apr 2024 07:57) (18 - 18)  SpO2: 95% (29 Apr 2024 07:57) (95% - 99%)    Parameters below as of 29 Apr 2024 07:57  Patient On (Oxygen Delivery Method): room air            PHYSICAL EXAM:    GEN: Well-developed, well-nourished. NAD, awake and alert. No drooling or pooling of secretions. No stridor or stertor. Good vocal quality, no hoarseness.   SKIN: Good color, non diaphoretic  HEENT: NC/AT; (Trismus improved from yesterday).  NECK: +L submandibular edema w/ palpable SMG, firm, no fluctuance noted on exam, mild TTP on exam.  Trachea midline. Neck supple.   RESP: No dyspnea, non-labored breathing. No use of accessory muscles.  CARDIO: +S1/S2  ABDO: Soft, NT.  EXT: WELCH x 4    LABS:  CBC-                        15.5   10.41 )-----------( 210      ( 29 Apr 2024 06:35 )             45.0     BMP/CMP-  29 Apr 2024 06:35    141    |  102    |  21     ----------------------------<  165    4.6     |  25     |  0.9      Ca    9.6        29 Apr 2024 06:35    TPro  6.7    /  Alb  3.9    /  TBili  0.5    /  DBili  x      /  AST  66     /  ALT  73     /  AlkPhos  89     29 Apr 2024 06:35    Coagulation Studies-    Endocrine Panel-  Calcium: 9.6 mg/dL (04-29 @ 06:35)  Calcium: 9.7 mg/dL (04-28 @ 15:27)              RADIOLOGY & ADDITIONAL STUDIES:  < from: CT Neck Soft Tissue w/ IV Cont (04.28.24 @ 16:00) >  ******PRELIMINARY REPORT******      ******PRELIMINARY REPORT******         ACC: [id number] EXAM:  CT NECK SOFT TISSUE IC   ORDERED BY: PRITI MOSQUERA     PROCEDURE DATE:  04/28/2024    ******PRELIMINARY REPORT******      ******PRELIMINARY REPORT******           INTERPRETATION:  Clinical history: Neck swelling    Technique:  Multidetector axial CT images of the neck were obtained   following the intravenous administration of 90 cc of nonionic contrast.   Images were reformatted on multiple planes and reviewed in bone and   soft-tissue windows.    Comparison: None.    Findings:  Aerodigestive structures: Unremarkable. No evidence of abnormal   enhancement.    Thyroid gland: Unremarkable.      Parotid and submandibular glands:  Indistinct hypodensity with local mass   effect measuring approximately 2.5 x 2.1 adjacent to the left   submandibular gland suspicious for infectious process without distinct   drainable fluid collection.      Lymph nodes: No pathologic adenopathy by imaging size criteria.    Vascular structures: Unremarkable.    Osseous structures: No fracture, dislocation or destructive lesion.    Paranasal sinuses: Clear.  Mastoid air cells:  Clear.    Partially visualized intracranial structures: Unremarkable.  Orbits: Unremarkable.    Partially visualized lung apices: Unremarkable.      Impression:  Indistinct hypodensity with local mass effect measuring approximately 2.5   x 2.1 adjacent to the left submandibular gland suspicious for infectious   process without distinct drainable fluid collection.        ******PRELIMINARY REPORT******      ******PRELIMINARY REPORT******       VIKY MULLINS MD; Resident Radiologist  This document is a PRELIMINARY interpretation and is pending final   attending approval. Apr 28 2024  6:48PM    < end of copied text >   ENT DAILY PROGRESS NOTE    Pt is a 63y Male a/w Left neck cellulitis- seen and examined at bedside with Dr. Woods. Patient reports some improvement of symptoms this AM. Patient reports he is able to open his mouth and able to consume PO liquids. Denies any fever, chills, SOB/difficulty breathing, odynphagia.       REVIEW OF SYSTEMS   [x] A ten-point review of systems was otherwise negative except as noted.  [ ] Due to altered mental status/intubation, subjective information were not able to be obtained from patient. History was obtained, to the extent possible, from review of the chart and collateral sources of information.    Allergies    penicillin (Hives)    Intolerances        MEDICATIONS:  acetaminophen     Tablet .. 650 milliGRAM(s) Oral every 6 hours PRN  amLODIPine   Tablet 5 milliGRAM(s) Oral daily  aspirin  chewable 81 milliGRAM(s) Oral daily  atorvastatin 40 milliGRAM(s) Oral at bedtime  clindamycin IVPB 600 milliGRAM(s) IV Intermittent every 6 hours  enoxaparin Injectable 40 milliGRAM(s) SubCutaneous every 24 hours  gabapentin 100 milliGRAM(s) Oral three times a day  levothyroxine 25 MICROGram(s) Oral daily  methocarbamol 750 milliGRAM(s) Oral every 8 hours PRN  pantoprazole    Tablet 40 milliGRAM(s) Oral before breakfast      Vital Signs Last 24 Hrs  T(C): 36.9 (29 Apr 2024 07:57), Max: 37.7 (28 Apr 2024 17:25)  T(F): 98.4 (29 Apr 2024 07:57), Max: 99.9 (28 Apr 2024 17:25)  HR: 65 (29 Apr 2024 07:57) (63 - 91)  BP: 119/71 (29 Apr 2024 07:57) (115/66 - 137/94)  BP(mean): 90 (29 Apr 2024 07:57) (90 - 90)  RR: 18 (29 Apr 2024 07:57) (18 - 18)  SpO2: 95% (29 Apr 2024 07:57) (95% - 99%)    Parameters below as of 29 Apr 2024 07:57  Patient On (Oxygen Delivery Method): room air            PHYSICAL EXAM:    GEN: NAD, awake and alert. No drooling or pooling of secretions. No stridor or stertor. Good vocal quality, no hoarseness.   SKIN: Good color, non diaphoretic  HEENT: NC/AT; +mild trismus (improving from yesterday), ORAL EXAM: No erythema or edema noted to buccal mucosa, tongue, FOM. Posterior OP unable to fully visualize 2/2 mild trismus. Poor dentition.   NECK: +L submandibular edema w/ palpable SMG, firm, no fluctuance noted on exam, mild TTP on exam.  Trachea midline. Neck supple.   RESP: No dyspnea, non-labored breathing. No use of accessory muscles.  CARDIO: +S1/S2  ABDO: Soft, NT.  EXT: WELCH x 4    LABS:  CBC-                        15.5   10.41 )-----------( 210      ( 29 Apr 2024 06:35 )             45.0     BMP/CMP-  29 Apr 2024 06:35    141    |  102    |  21     ----------------------------<  165    4.6     |  25     |  0.9      Ca    9.6        29 Apr 2024 06:35    TPro  6.7    /  Alb  3.9    /  TBili  0.5    /  DBili  x      /  AST  66     /  ALT  73     /  AlkPhos  89     29 Apr 2024 06:35    Coagulation Studies-    Endocrine Panel-  Calcium: 9.6 mg/dL (04-29 @ 06:35)  Calcium: 9.7 mg/dL (04-28 @ 15:27)              RADIOLOGY & ADDITIONAL STUDIES:  < from: CT Neck Soft Tissue w/ IV Cont (04.28.24 @ 16:00) >  ******PRELIMINARY REPORT******      ******PRELIMINARY REPORT******         ACC: [id number] EXAM:  CT NECK SOFT TISSUE IC   ORDERED BY: PRITI MOSQUERA     PROCEDURE DATE:  04/28/2024    ******PRELIMINARY REPORT******      ******PRELIMINARY REPORT******           INTERPRETATION:  Clinical history: Neck swelling    Technique:  Multidetector axial CT images of the neck were obtained   following the intravenous administration of 90 cc of nonionic contrast.   Images were reformatted on multiple planes and reviewed in bone and   soft-tissue windows.    Comparison: None.    Findings:  Aerodigestive structures: Unremarkable. No evidence of abnormal   enhancement.    Thyroid gland: Unremarkable.      Parotid and submandibular glands:  Indistinct hypodensity with local mass   effect measuring approximately 2.5 x 2.1 adjacent to the left   submandibular gland suspicious for infectious process without distinct   drainable fluid collection.      Lymph nodes: No pathologic adenopathy by imaging size criteria.    Vascular structures: Unremarkable.    Osseous structures: No fracture, dislocation or destructive lesion.    Paranasal sinuses: Clear.  Mastoid air cells:  Clear.    Partially visualized intracranial structures: Unremarkable.  Orbits: Unremarkable.    Partially visualized lung apices: Unremarkable.      Impression:  Indistinct hypodensity with local mass effect measuring approximately 2.5   x 2.1 adjacent to the left submandibular gland suspicious for infectious   process without distinct drainable fluid collection.        ******PRELIMINARY REPORT******      ******PRELIMINARY REPORT******       VIKY MULLINS MD; Resident Radiologist  This document is a PRELIMINARY interpretation and is pending final   attending approval. Apr 28 2024  6:48PM    < end of copied text >

## 2024-04-29 NOTE — SWALLOW BEDSIDE ASSESSMENT ADULT - SLP PERTINENT HISTORY OF CURRENT PROBLEM
63 year old male PMHx HTN, HLD, COPD, Hypothyroidism, presents to the ED for left neck swelling. Pt tried putting his dentures in that he has not worn for a long time. He reports pain and "nerve damage" since putting them in 1 week ago. 2 days ago he developed a sore throat and low grade fevers so he went to urgent care and was started on Azithro for strep throat. Pt reports difficulty opening his jaw and eating. He also reports worsening swelling on the left side of his neck so he came to the ED. Denies chest pain, SOB, dysphagia, N/V/D.

## 2024-04-29 NOTE — PROGRESS NOTE ADULT - ASSESSMENT
62 y/o man with PMH of HTN, hyperlipidemia, COPD, and hypothyroidism presented to the ED for left neck swelling and difficulty opening his jaw and eating.    1. Abscess along left submandibular gland  dental caries and periapical lucencies  denture user  dental and ENT evals appreciated  s/p extraction tooth #17 today with expression of pus  on clindamycin (he has PCN allergy -> hives)  s/p decadron  speak to dental if further I&D is needed  f/u blood cultures  leukocytosis resolved  no sepsis on admission    2. Mild Transaminitis   check Hep C  pt asymptomatic  if stable, further workup as outpt    3. Continue current management for HTN, hypothyroid, hyperlipidemia (can continue statin if LFTs are stable and not rising), COPD    4. DVT prophylaxis - OOB and ambulating    full code      PROGRESS NOTE HANDOFF    Pending: f/u blood cultures, assess if further I&D is needed  pt informed of the plan of care  Disposition: home

## 2024-04-30 LAB
ALBUMIN SERPL ELPH-MCNC: 3.3 G/DL — LOW (ref 3.5–5.2)
ALP SERPL-CCNC: 77 U/L — SIGNIFICANT CHANGE UP (ref 30–115)
ALT FLD-CCNC: 67 U/L — HIGH (ref 0–41)
ANION GAP SERPL CALC-SCNC: 13 MMOL/L — SIGNIFICANT CHANGE UP (ref 7–14)
AST SERPL-CCNC: 48 U/L — HIGH (ref 0–41)
BASOPHILS # BLD AUTO: 0.01 K/UL — SIGNIFICANT CHANGE UP (ref 0–0.2)
BASOPHILS NFR BLD AUTO: 0.1 % — SIGNIFICANT CHANGE UP (ref 0–1)
BILIRUB SERPL-MCNC: 0.4 MG/DL — SIGNIFICANT CHANGE UP (ref 0.2–1.2)
BUN SERPL-MCNC: 34 MG/DL — HIGH (ref 10–20)
CALCIUM SERPL-MCNC: 8.7 MG/DL — SIGNIFICANT CHANGE UP (ref 8.4–10.5)
CHLORIDE SERPL-SCNC: 103 MMOL/L — SIGNIFICANT CHANGE UP (ref 98–110)
CO2 SERPL-SCNC: 25 MMOL/L — SIGNIFICANT CHANGE UP (ref 17–32)
CREAT SERPL-MCNC: 1 MG/DL — SIGNIFICANT CHANGE UP (ref 0.7–1.5)
EGFR: 85 ML/MIN/1.73M2 — SIGNIFICANT CHANGE UP
EOSINOPHIL # BLD AUTO: 0.01 K/UL — SIGNIFICANT CHANGE UP (ref 0–0.7)
EOSINOPHIL NFR BLD AUTO: 0.1 % — SIGNIFICANT CHANGE UP (ref 0–8)
GLUCOSE SERPL-MCNC: 109 MG/DL — HIGH (ref 70–99)
HCT VFR BLD CALC: 42.1 % — SIGNIFICANT CHANGE UP (ref 42–52)
HGB BLD-MCNC: 14.6 G/DL — SIGNIFICANT CHANGE UP (ref 14–18)
IMM GRANULOCYTES NFR BLD AUTO: 0.7 % — HIGH (ref 0.1–0.3)
LYMPHOCYTES # BLD AUTO: 17.2 % — LOW (ref 20.5–51.1)
LYMPHOCYTES # BLD AUTO: 3.01 K/UL — SIGNIFICANT CHANGE UP (ref 1.2–3.4)
MAGNESIUM SERPL-MCNC: 2.8 MG/DL — HIGH (ref 1.8–2.4)
MCHC RBC-ENTMCNC: 33.3 PG — HIGH (ref 27–31)
MCHC RBC-ENTMCNC: 34.7 G/DL — SIGNIFICANT CHANGE UP (ref 32–37)
MCV RBC AUTO: 96.1 FL — HIGH (ref 80–94)
MONOCYTES # BLD AUTO: 0.96 K/UL — HIGH (ref 0.1–0.6)
MONOCYTES NFR BLD AUTO: 5.5 % — SIGNIFICANT CHANGE UP (ref 1.7–9.3)
NEUTROPHILS # BLD AUTO: 13.44 K/UL — HIGH (ref 1.4–6.5)
NEUTROPHILS NFR BLD AUTO: 76.4 % — HIGH (ref 42.2–75.2)
NRBC # BLD: 0 /100 WBCS — SIGNIFICANT CHANGE UP (ref 0–0)
PLATELET # BLD AUTO: 201 K/UL — SIGNIFICANT CHANGE UP (ref 130–400)
PMV BLD: 10.8 FL — HIGH (ref 7.4–10.4)
POTASSIUM SERPL-MCNC: 4.3 MMOL/L — SIGNIFICANT CHANGE UP (ref 3.5–5)
POTASSIUM SERPL-SCNC: 4.3 MMOL/L — SIGNIFICANT CHANGE UP (ref 3.5–5)
PROT SERPL-MCNC: 5.6 G/DL — LOW (ref 6–8)
RBC # BLD: 4.38 M/UL — LOW (ref 4.7–6.1)
RBC # FLD: 13.5 % — SIGNIFICANT CHANGE UP (ref 11.5–14.5)
SODIUM SERPL-SCNC: 141 MMOL/L — SIGNIFICANT CHANGE UP (ref 135–146)
WBC # BLD: 17.55 K/UL — HIGH (ref 4.8–10.8)
WBC # FLD AUTO: 17.55 K/UL — HIGH (ref 4.8–10.8)

## 2024-04-30 PROCEDURE — 99223 1ST HOSP IP/OBS HIGH 75: CPT

## 2024-04-30 PROCEDURE — 99233 SBSQ HOSP IP/OBS HIGH 50: CPT

## 2024-04-30 RX ORDER — LANOLIN ALCOHOL/MO/W.PET/CERES
5 CREAM (GRAM) TOPICAL AT BEDTIME
Refills: 0 | Status: DISCONTINUED | OUTPATIENT
Start: 2024-04-30 | End: 2024-05-06

## 2024-04-30 RX ORDER — LANOLIN ALCOHOL/MO/W.PET/CERES
5 CREAM (GRAM) TOPICAL AT BEDTIME
Refills: 0 | Status: COMPLETED | OUTPATIENT
Start: 2024-04-30 | End: 2024-04-30

## 2024-04-30 RX ORDER — CEFTRIAXONE 500 MG/1
INJECTION, POWDER, FOR SOLUTION INTRAMUSCULAR; INTRAVENOUS
Refills: 0 | Status: DISCONTINUED | OUTPATIENT
Start: 2024-04-30 | End: 2024-05-06

## 2024-04-30 RX ORDER — CEFTRIAXONE 500 MG/1
2000 INJECTION, POWDER, FOR SOLUTION INTRAMUSCULAR; INTRAVENOUS ONCE
Refills: 0 | Status: COMPLETED | OUTPATIENT
Start: 2024-04-30 | End: 2024-04-30

## 2024-04-30 RX ORDER — CEFTRIAXONE 500 MG/1
2000 INJECTION, POWDER, FOR SOLUTION INTRAMUSCULAR; INTRAVENOUS EVERY 24 HOURS
Refills: 0 | Status: DISCONTINUED | OUTPATIENT
Start: 2024-05-01 | End: 2024-05-06

## 2024-04-30 RX ORDER — KETOROLAC TROMETHAMINE 30 MG/ML
10 SYRINGE (ML) INJECTION ONCE
Refills: 0 | Status: DISCONTINUED | OUTPATIENT
Start: 2024-04-30 | End: 2024-04-30

## 2024-04-30 RX ORDER — CHLORHEXIDINE GLUCONATE 213 G/1000ML
15 SOLUTION TOPICAL
Refills: 0 | Status: DISCONTINUED | OUTPATIENT
Start: 2024-04-30 | End: 2024-05-06

## 2024-04-30 RX ADMIN — Medication 81 MILLIGRAM(S): at 11:27

## 2024-04-30 RX ADMIN — Medication 10 MILLIGRAM(S): at 22:32

## 2024-04-30 RX ADMIN — ATORVASTATIN CALCIUM 40 MILLIGRAM(S): 80 TABLET, FILM COATED ORAL at 21:06

## 2024-04-30 RX ADMIN — AMLODIPINE BESYLATE 5 MILLIGRAM(S): 2.5 TABLET ORAL at 05:55

## 2024-04-30 RX ADMIN — CEFTRIAXONE 100 MILLIGRAM(S): 500 INJECTION, POWDER, FOR SOLUTION INTRAMUSCULAR; INTRAVENOUS at 17:01

## 2024-04-30 RX ADMIN — Medication 100 MILLIGRAM(S): at 22:05

## 2024-04-30 RX ADMIN — METHOCARBAMOL 750 MILLIGRAM(S): 500 TABLET, FILM COATED ORAL at 21:06

## 2024-04-30 RX ADMIN — Medication 400 MILLIGRAM(S): at 12:10

## 2024-04-30 RX ADMIN — Medication 5 MILLIGRAM(S): at 00:45

## 2024-04-30 RX ADMIN — GABAPENTIN 100 MILLIGRAM(S): 400 CAPSULE ORAL at 05:56

## 2024-04-30 RX ADMIN — Medication 100 MILLIGRAM(S): at 11:26

## 2024-04-30 RX ADMIN — Medication 25 MICROGRAM(S): at 05:56

## 2024-04-30 RX ADMIN — CHLORHEXIDINE GLUCONATE 15 MILLILITER(S): 213 SOLUTION TOPICAL at 21:39

## 2024-04-30 RX ADMIN — Medication 400 MILLIGRAM(S): at 11:26

## 2024-04-30 RX ADMIN — GABAPENTIN 100 MILLIGRAM(S): 400 CAPSULE ORAL at 13:03

## 2024-04-30 RX ADMIN — GABAPENTIN 100 MILLIGRAM(S): 400 CAPSULE ORAL at 21:06

## 2024-04-30 RX ADMIN — PANTOPRAZOLE SODIUM 40 MILLIGRAM(S): 20 TABLET, DELAYED RELEASE ORAL at 06:04

## 2024-04-30 RX ADMIN — Medication 100 MILLIGRAM(S): at 05:55

## 2024-04-30 RX ADMIN — Medication 650 MILLIGRAM(S): at 17:04

## 2024-04-30 RX ADMIN — Medication 100 MILLIGRAM(S): at 00:45

## 2024-04-30 RX ADMIN — Medication 5 MILLIGRAM(S): at 22:32

## 2024-04-30 NOTE — PROGRESS NOTE ADULT - SUBJECTIVE AND OBJECTIVE BOX
ENT PROGRESS NOTE    Pt is a 63y M a/w L submandibular edema now s/p extraction of tooth #17 by Dental. Pt seen and examined at bedside today AM. Pt reports some improvement from yesterday. Tolerating PO. Denies difficulty breathing.       REVIEW OF SYSTEMS   [x] A ten-point review of systems was otherwise negative except as noted.      Allergies  penicillin (Hives)      MEDICATIONS:  acetaminophen     Tablet .. 650 milliGRAM(s) Oral every 6 hours PRN  amLODIPine   Tablet 5 milliGRAM(s) Oral daily  aspirin  chewable 81 milliGRAM(s) Oral daily  atorvastatin 40 milliGRAM(s) Oral at bedtime  clindamycin IVPB 600 milliGRAM(s) IV Intermittent every 6 hours  enoxaparin Injectable 40 milliGRAM(s) SubCutaneous every 24 hours  gabapentin 100 milliGRAM(s) Oral three times a day  ibuprofen  Tablet. 400 milliGRAM(s) Oral every 6 hours PRN  levothyroxine 25 MICROGram(s) Oral daily  methocarbamol 750 milliGRAM(s) Oral every 8 hours PRN  pantoprazole    Tablet 40 milliGRAM(s) Oral before breakfast      Vital Signs Last 24 Hrs  T(C): 36.4 (30 Apr 2024 07:24), Max: 37.7 (29 Apr 2024 15:55)  T(F): 97.5 (30 Apr 2024 07:24), Max: 99.8 (29 Apr 2024 15:55)  HR: 68 (30 Apr 2024 07:24) (67 - 107)  BP: 121/73 (30 Apr 2024 07:24) (100/56 - 138/70)  BP(mean): 97 (29 Apr 2024 15:55) (97 - 97)  RR: 18 (30 Apr 2024 07:24) (18 - 18)  SpO2: 97% (29 Apr 2024 22:19) (94% - 97%)    Parameters below as of 29 Apr 2024 22:19  Patient On (Oxygen Delivery Method): room air        PHYSICAL EXAM:    GEN: NAD. No drooling or pooling of secretions. No stridor. Good vocal quality, no hoarseness.   SKIN: Good color, non diaphoretic  HEENT:  +trismus; No erythema or edema noted to buccal mucosa, FOM. Poor dentition.   NECK: +L submandibular edema w/ firm, no fluctuance noted on exam, mild TTP on exam   RESP: Non-labored breathing  ABDO: Soft, NT  EXT: WELCH x 4

## 2024-04-30 NOTE — PROGRESS NOTE ADULT - ASSESSMENT
Pt is a 63y M a/w L submandibular edema,  now s/p extraction of tooth #17 by Dental on 4/29/24.     ·	Cont abx as per ID recs-- currently on IV Clinda    ·	s/p extraction of tooth #17 by Dental-- purulence expressed; f/u dental recs  ·	f/u I     ·	s/w Medicine team

## 2024-04-30 NOTE — PROGRESS NOTE ADULT - ASSESSMENT
63 year old male PMHx HTN, HLD, COPD, Hypothyroidism, presents to the ED for left neck swelling after putting in dentures that don't fit well. He went to urgent care yesterday and was started on Azithro for strep throat. CT neck prelim showing infectious process without distinct drainable fluid collection. S/p #17 tooth extraction with pus expressed. ENT consulted, recommend IR drainage, neuro IR evaluated not amenable to collection.     # Left submandibular gland abscess  - no sepsis poa  - CT neck Rim-enhancing fluid collection along the inner margin of the left mandibular body measuring 1 x 2 x 2 cm (TRV, AP, CC; ser 3 im 70) consistent with abscess. Extensive associated soft tissue swelling including: -Left  space edema and expansion.-Left submandibular gland edema. -Phlegmon within the submandibular space with deviation of the floor of mouth structures to the right. -Left oropharyngeal, supraglottic laryngeal and piriform sinus edema. The left pyriform sinus is completely effaced. -The uvula is edematous.  - s/p Decadron x3 doses  - c/w IV Clindamycin  - c/w PPI  - Blood culture NGTD  - Dental consult appreciated - s/p non-surgical extraction of #17 completed, purulent material expressed  - Dental F/U with outpatient dentist for comprehensive dental care or [facility] dental clinic   - ENT consulted, recommend IR drainage of absecess  - Neuro IR consulted, Dr. Smith reviewed images not amenable for drainage  - Consult ID  - WBC increased 11.78--> 10.43--> 17.55 4/30  - Trend WBC    # Transaminitis, mild  - AST 61, ALT 51 on admission  - no abdominal pain  - avoid hepatotoxic meds  - Outpatient follow up     # HTN  - c/w Amlodipine 5mg qd    # HLD  - c/w statin    # Hypothyroidism   - c/w Synthroid 25 mcg qd    #DVT ppx: Lovenox  #GI ppx: PPI  #Diet: as tolerated  #Activity: AAT  #Dispo: Med

## 2024-04-30 NOTE — PROGRESS NOTE ADULT - SUBJECTIVE AND OBJECTIVE BOX
24H events:    Patient is a 63y old Male who presents with a chief complaint of neck pain (29 Apr 2024 12:07)    Primary diagnosis of Cellulitis of neck      Day 1:      Today is 2d of hospitalization. This morning patient was seen and examined at bedside, resting comfortably in bed.    No acute or major events overnight. Patient denies fevers, chills, headache, chest pain, dyspnea, cough, nausea, vomiting, abdominal pain or BM changes.     Code Status:    Family communication:  Contact date:  Name of person contacted:  Relationship to patient:  Communication details:  What matters most:    PAST MEDICAL & SURGICAL HISTORY  HTN (hypertension)    HLD (hyperlipidemia)    Goiter    Emphysema/COPD  "mild" per pt    OA (osteoarthritis)    Back pain    Anxiety    Gallstones    COPD, mild    Hypothyroidism    H/O bilateral hip replacements    S/P inguinal hernia repair      SOCIAL HISTORY:  Social History:      ALLERGIES:  penicillin (Hives)    MEDICATIONS:  STANDING MEDICATIONS  amLODIPine   Tablet 5 milliGRAM(s) Oral daily  aspirin  chewable 81 milliGRAM(s) Oral daily  atorvastatin 40 milliGRAM(s) Oral at bedtime  chlorhexidine 0.12% Liquid 15 milliLiter(s) Swish and Spit two times a day  clindamycin IVPB 600 milliGRAM(s) IV Intermittent every 6 hours  enoxaparin Injectable 40 milliGRAM(s) SubCutaneous every 24 hours  gabapentin 100 milliGRAM(s) Oral three times a day  levothyroxine 25 MICROGram(s) Oral daily  pantoprazole    Tablet 40 milliGRAM(s) Oral before breakfast    PRN MEDICATIONS  acetaminophen     Tablet .. 650 milliGRAM(s) Oral every 6 hours PRN  ibuprofen  Tablet. 400 milliGRAM(s) Oral every 6 hours PRN  methocarbamol 750 milliGRAM(s) Oral every 8 hours PRN    VITALS:   T(F): 97.5  HR: 61  BP: 102/62  RR: 16  SpO2: 97%    PHYSICAL EXAM:  GENERAL:   ( x) NAD, lying in bed comfortably     (  ) obtunded     (  ) lethargic     (  ) somnolent    HEAD:   ( x) Atraumatic     (  ) hematoma     (  ) laceration (specify location:       )     NECK:  (x) Supple     (  ) neck stiffness     (  ) nuchal rigidity     (  )  no JVD     (  ) JVD present ( -- cm)    HEART:  Rate -->     (x) normal rate     (  ) bradycardic     (  ) tachycardic  Rhythm -->     (x) regular     (  ) regularly irregular     (  ) irregularly irregular  Murmurs -->     (x) normal s1s2     (  ) systolic murmur     (  ) diastolic murmur     (  ) continuous murmur      (  ) S3 present     (  ) S4 present    LUNGS:   ( x)Unlabored respirations     (  ) tachypnea  ( x) B/L air entry     (  ) decreased breath sounds in:  (location     )    ( x) no adventitious sound     (  ) crackles     (  ) wheezing      (  ) rhonchi      (specify location:       )  (  ) chest wall tenderness (specify location:       )    ABDOMEN:   ( x) Soft     (  ) tense   |   (X  ) nondistended     (  ) distended   |   ( X ) +BS     (  ) hypoactive bowel sounds     (  ) hyperactive bowel sounds  ( x) nontender     (  ) RUQ tenderness     (  ) RLQ tenderness     (  ) LLQ tenderness     (  ) epigastric tenderness     (  ) diffuse tenderness  (  ) Splenomegaly      (  ) Hepatomegaly      (  ) Jaundice     (  ) ecchymosis     EXTREMITIES:  ( x) Normal     (  ) Rash     (  ) ecchymosis     (  ) varicose veins      (  ) pitting edema     (  ) non-pitting edema   (  ) ulceration     (  ) gangrene:     (location:     )    NERVOUS SYSTEM:    ( x) A&Ox3     (  ) confused     (  ) lethargic  CN II-XII:     (  ) Intact     (  ) deficits found     (Specify:     )   Upper extremities:     (  ) no sensorimotor deficits     (  ) weakness     (  ) loss of proprioception/vibration     (  ) loss of touch/temperature (specify:    )  Lower extremities:     (  ) no sensorimotor deficits     (  ) weakness     (  ) loss of proprioception/vibration     (  ) loss of touch/temperature (specify:    )    SKIN:   ( X ) No rashes or lesions     (  ) maculopapular rash     (  ) pustules     (  ) vesicles     (  ) ulcer     (  ) ecchymosis     (specify location:     )    AMPAC score :    (  ) Indwelling Russell Catheter:   Date inserted:    Reason (  ) Critical illness     (  ) urinary retention    (  ) Accurate Ins/Outs Monitoring     (  ) CMO patient    (  ) Central Line:   Date inserted:  Location: (  ) Right IJ     (  ) Left IJ     (  ) Right Fem     (  ) Left Fem    (  ) SPC        (  ) pigtail       (  ) PEG tube       (  ) colostomy       (  ) jejunostomy  (  ) U-Dall    LABS:                        14.6   17.55 )-----------( 201 ( 30 Apr 2024 07:30 )             42.1     04-30    141  |  103  |  34<H>  ----------------------------<  109<H>  4.3   |  25  |  1.0    Ca    8.7      30 Apr 2024 07:30  Mg     2.8     04-30    TPro  5.6<L>  /  Alb  3.3<L>  /  TBili  0.4  /  DBili  x   /  AST  48<H>  /  ALT  67<H>  /  AlkPhos  77  04-30      Urinalysis Basic - ( 30 Apr 2024 07:30 )    Color: x / Appearance: x / SG: x / pH: x  Gluc: 109 mg/dL / Ketone: x  / Bili: x / Urobili: x   Blood: x / Protein: x / Nitrite: x   Leuk Esterase: x / RBC: x / WBC x   Sq Epi: x / Non Sq Epi: x / Bacteria: x            Culture - Blood (collected 29 Apr 2024 06:35)  Source: .Blood None  Preliminary Report (30 Apr 2024 14:02):    No growth at 24 hours          RADIOLOGY:                 24H events:    Patient is a 63y old Male who presents with a chief complaint of neck pain (29 Apr 2024 12:07)    Primary diagnosis of Cellulitis of neck      Today is 2d of hospitalization. This morning patient was seen and examined at bedside, resting comfortably in bed.  No acute or major events overnight. Patient is feeling better, reports some left sided jaw swelling and pain, improved. Tolerating PO, denies any fevers, chills, SOB, n/v/diarrhea. Asking about disposition but will stay as long as needed.     Code Status: Full      PAST MEDICAL & SURGICAL HISTORY  HTN (hypertension)    HLD (hyperlipidemia)    Goiter    Emphysema/COPD  "mild" per pt    OA (osteoarthritis)    Back pain    Anxiety    Gallstones    COPD, mild    Hypothyroidism    H/O bilateral hip replacements    S/P inguinal hernia repair      SOCIAL HISTORY:  Social History:      ALLERGIES:  penicillin (Hives)    MEDICATIONS:  STANDING MEDICATIONS  amLODIPine   Tablet 5 milliGRAM(s) Oral daily  aspirin  chewable 81 milliGRAM(s) Oral daily  atorvastatin 40 milliGRAM(s) Oral at bedtime  chlorhexidine 0.12% Liquid 15 milliLiter(s) Swish and Spit two times a day  clindamycin IVPB 600 milliGRAM(s) IV Intermittent every 6 hours  enoxaparin Injectable 40 milliGRAM(s) SubCutaneous every 24 hours  gabapentin 100 milliGRAM(s) Oral three times a day  levothyroxine 25 MICROGram(s) Oral daily  pantoprazole    Tablet 40 milliGRAM(s) Oral before breakfast    PRN MEDICATIONS  acetaminophen     Tablet .. 650 milliGRAM(s) Oral every 6 hours PRN  ibuprofen  Tablet. 400 milliGRAM(s) Oral every 6 hours PRN  methocarbamol 750 milliGRAM(s) Oral every 8 hours PRN    VITALS:   T(F): 97.5  HR: 61  BP: 102/62  RR: 16  SpO2: 97%    PHYSICAL EXAM:  GENERAL:   ( x) NAD, lying in bed comfortably     (  ) obtunded     (  ) lethargic     (  ) somnolent    HEAD: left sided submandibular swelling  ( x) Atraumatic     (  ) hematoma     (  ) laceration (specify location:       )     NECK:  (x) Supple     (  ) neck stiffness     (  ) nuchal rigidity     (  )  no JVD     (  ) JVD present ( -- cm)    HEART:  Rate -->     (x) normal rate     (  ) bradycardic     (  ) tachycardic  Rhythm -->     (x) regular     (  ) regularly irregular     (  ) irregularly irregular  Murmurs -->     (x) normal s1s2     (  ) systolic murmur     (  ) diastolic murmur     (  ) continuous murmur      (  ) S3 present     (  ) S4 present    LUNGS:   ( x)Unlabored respirations     (  ) tachypnea  ( x) B/L air entry     (  ) decreased breath sounds in:  (location     )    ( x) no adventitious sound     (  ) crackles     (  ) wheezing      (  ) rhonchi      (specify location:       )  (  ) chest wall tenderness (specify location:       )    ABDOMEN:   ( x) Soft     (  ) tense   |   (X  ) nondistended     (  ) distended   |   ( X ) +BS     (  ) hypoactive bowel sounds     (  ) hyperactive bowel sounds  ( x) nontender     (  ) RUQ tenderness     (  ) RLQ tenderness     (  ) LLQ tenderness     (  ) epigastric tenderness     (  ) diffuse tenderness  (  ) Splenomegaly      (  ) Hepatomegaly      (  ) Jaundice     (  ) ecchymosis     EXTREMITIES:  ( x) Normal     (  ) Rash     (  ) ecchymosis     (  ) varicose veins      (  ) pitting edema     (  ) non-pitting edema   (  ) ulceration     (  ) gangrene:     (location:     )    NERVOUS SYSTEM:    ( x) A&Ox3     (  ) confused     (  ) lethargic  CN II-XII:     (  ) Intact     (  ) deficits found     (Specify:     )   Upper extremities:     (  ) no sensorimotor deficits     (  ) weakness     (  ) loss of proprioception/vibration     (  ) loss of touch/temperature (specify:    )  Lower extremities:     (  ) no sensorimotor deficits     (  ) weakness     (  ) loss of proprioception/vibration     (  ) loss of touch/temperature (specify:    )    SKIN:   ( X ) No rashes or lesions     (  ) maculopapular rash     (  ) pustules     (  ) vesicles     (  ) ulcer     (  ) ecchymosis     (specify location:     )      LABS:                        14.6   17.55 )-----------( 201      ( 30 Apr 2024 07:30 )             42.1     04-30    141  |  103  |  34<H>  ----------------------------<  109<H>  4.3   |  25  |  1.0    Ca    8.7      30 Apr 2024 07:30  Mg     2.8     04-30    TPro  5.6<L>  /  Alb  3.3<L>  /  TBili  0.4  /  DBili  x   /  AST  48<H>  /  ALT  67<H>  /  AlkPhos  77  04-30      Urinalysis Basic - ( 30 Apr 2024 07:30 )    Color: x / Appearance: x / SG: x / pH: x  Gluc: 109 mg/dL / Ketone: x  / Bili: x / Urobili: x   Blood: x / Protein: x / Nitrite: x   Leuk Esterase: x / RBC: x / WBC x   Sq Epi: x / Non Sq Epi: x / Bacteria: x            Culture - Blood (collected 29 Apr 2024 06:35)  Source: .Blood None  Preliminary Report (30 Apr 2024 14:02):    No growth at 24 hours          RADIOLOGY:

## 2024-04-30 NOTE — PROGRESS NOTE ADULT - SUBJECTIVE AND OBJECTIVE BOX
LIGIA ROMERO  63y  Male      Patient is a 63y old Male who presents with a chief complaint of neck pain (29 Apr 2024 12:07)      INTERVAL HPI/OVERNIGHT EVENTS:  Patient seen and examined earlier this morning  sitting in the bed  denies pain; able to tolerate breakfast      REVIEW OF SYSTEMS:  CONSTITUTIONAL: No fever, weight loss, or fatigue  EYES: No eye pain, visual disturbances, or discharge  ENMT:  left side jaw discomfort   NECK: No pain or stiffness  RESPIRATORY: No cough, wheezing, chills or hemoptysis; No shortness of breath  CARDIOVASCULAR: No chest pain, palpitations, dizziness, or leg swelling  GASTROINTESTINAL: No abdominal or epigastric pain. No nausea, vomiting, or hematemesis; No diarrhea or constipation. No melena or hematochezia.  GENITOURINARY: No dysuria, frequency, hematuria, or incontinence  NEUROLOGICAL: No headaches, memory loss, loss of strength, numbness, or tremors  SKIN: No itching, burning, rashes, or lesions   LYMPH NODES: No enlarged glands  ENDOCRINE: No heat or cold intolerance; No hair loss  MUSCULOSKELETAL: No joint pain or swelling; No muscle, back, or extremity pain  PSYCHIATRIC: No depression, anxiety, mood swings, or difficulty sleeping  HEME/LYMPH: No easy bruising, or bleeding gums  ALLERY AND IMMUNOLOGIC: No hives or eczema    T(C): 36.4 (04-30-24 @ 07:24), Max: 37.7 (04-29-24 @ 15:55)  HR: 68 (04-30-24 @ 07:24) (67 - 107)  BP: 121/73 (04-30-24 @ 07:24) (100/56 - 138/70)  RR: 18 (04-30-24 @ 07:24) (18 - 18)  SpO2: 97% (04-29-24 @ 22:19) (94% - 97%)    PHYSICAL EXAM:  GENERAL: NAD, well-groomed,   HEAD:  Atraumatic, Normocephalic  EYES: conjunctiva and sclera clear  ENMT: Moist mucous membranes, unable to open mouth fully, partially seen edematous uvula   left lower facial and neck swelling  NECK: Supple, No JVD, Normal thyroid  NERVOUS SYSTEM:  Alert & Oriented X3, Good concentration; moves all extremities   CHEST/LUNG: good air entry   HEART: Regular rate and rhythm; No murmurs, rubs, or gallops  ABDOMEN: Soft, Nontender, Nondistended; Bowel sounds present  EXTREMITIES:  2+ Peripheral Pulses, No clubbing, cyanosis, or edema  LYMPH: No lymphadenopathy noted  SKIN: No rashes or lesions    Consultant(s) Notes Reviewed:  [x ] YES  [ ] NO  Care Discussed with Consultants/Other Providers [ x] YES  [ ] NO    LAB:                        14.6   17.55 )-----------( 201      ( 30 Apr 2024 07:30 )             42.1     04-30    141  |  103  |  34<H>  ----------------------------<  109<H>  4.3   |  25  |  1.0    Ca    8.7      30 Apr 2024 07:30  Mg     2.8     04-30    TPro  5.6<L>  /  Alb  3.3<L>  /  TBili  0.4  /  DBili  x   /  AST  48<H>  /  ALT  67<H>  /  AlkPhos  77  04-30    LIVER FUNCTIONS - ( 30 Apr 2024 07:30 )  Alb: 3.3 g/dL / Pro: 5.6 g/dL / ALK PHOS: 77 U/L / ALT: 67 U/L / AST: 48 U/L / GGT: x               Drug Dosing Weight  Weight (kg): 90.7 (28 Apr 2024 13:58)    CAPILLARY BLOOD GLUCOSE  POCT Blood Glucose.: 126 mg/dL (29 Apr 2024 13:44)        Urinalysis Basic - ( 30 Apr 2024 07:30 )    Color: x / Appearance: x / SG: x / pH: x  Gluc: 109 mg/dL / Ketone: x  / Bili: x / Urobili: x   Blood: x / Protein: x / Nitrite: x   Leuk Esterase: x / RBC: x / WBC x   Sq Epi: x / Non Sq Epi: x / Bacteria: x        RADIOLOGY & ADDITIONAL TESTS:  Imaging Personally Reviewed:  [x] YES  [ ] NO          MEDS:  acetaminophen     Tablet .. 650 milliGRAM(s) Oral every 6 hours PRN  amLODIPine   Tablet 5 milliGRAM(s) Oral daily  aspirin  chewable 81 milliGRAM(s) Oral daily  atorvastatin 40 milliGRAM(s) Oral at bedtime  clindamycin IVPB 600 milliGRAM(s) IV Intermittent every 6 hours  enoxaparin Injectable 40 milliGRAM(s) SubCutaneous every 24 hours  gabapentin 100 milliGRAM(s) Oral three times a day  ibuprofen  Tablet. 400 milliGRAM(s) Oral every 6 hours PRN  levothyroxine 25 MICROGram(s) Oral daily  methocarbamol 750 milliGRAM(s) Oral every 8 hours PRN  pantoprazole    Tablet 40 milliGRAM(s) Oral before breakfast

## 2024-04-30 NOTE — CONSULT NOTE ADULT - ASSESSMENT
ASSESSMENT  63 year old male PMHx HTN, HLD, COPD, Hypothyroidism, presents to the ED for left neck swelling.     IMPRESSION  #Dental infection #17 with deep neck infection  WBC 11  #Recent strep +   #Abx allergy: penicillin (Hives)    Creatinine: 1.0 (04-30-24 @ 07:30)      Weight (kg): 90.7 (04-28-24 @ 13:58)    RECOMMENDATIONS  - ESR, CRP  - Strep cx throat  - ADD ceftriaxone 2g q24h IV  - Change to Clinda 900mg q8h IV   - Appreciate Dental/ENT consults    If any questions, please send a message or call on [employer] Teams  Please continue to update ID with any pertinent new laboratory, radiographic findings, or change in clinical status

## 2024-04-30 NOTE — PROGRESS NOTE ADULT - ASSESSMENT
64 y/o man with PMH of HTN, hyperlipidemia, COPD, and hypothyroidism presented to the ED for left neck swelling and difficulty opening his jaw and eating.    #left submandibular gland abscess  #Leukocytosis  - s/p extraction of tooth #17 with expression of pus on 4/29  - ENT following - requested IR eval of abscess drainage  - repeat cbc in am  - continue with Clinda IV  - dental follow up  - tolerating diet   - peridex swish and spit q12  - pain control with motrin/tylenol     #Mild Transaminitis   - outpatient follow up     # HTN/DL  - continue home meds    #hypothyroid  - on synthroid     #COPD   - not in exacerbation     Progress Note Handoff  Pending Consults: IR  Pending Tests: cbc  Pending Results: cbc  Family Discussion: Discussed labs, meds, IR eval and repeating cbc in am. PFD placed for 24hrs   Disposition: Home___x__/SNF______/Other_____/Unknown at this time_____  Spent over 55 min reviewing chart, speaking with patient/family and on coordinating patient care during interdisciplinary rounds     Please call me with any questions at extension [phone]

## 2024-05-01 LAB
ANION GAP SERPL CALC-SCNC: 11 MMOL/L — SIGNIFICANT CHANGE UP (ref 7–14)
BUN SERPL-MCNC: 20 MG/DL — SIGNIFICANT CHANGE UP (ref 10–20)
CALCIUM SERPL-MCNC: 8.7 MG/DL — SIGNIFICANT CHANGE UP (ref 8.4–10.5)
CHLORIDE SERPL-SCNC: 104 MMOL/L — SIGNIFICANT CHANGE UP (ref 98–110)
CO2 SERPL-SCNC: 28 MMOL/L — SIGNIFICANT CHANGE UP (ref 17–32)
CREAT SERPL-MCNC: 0.9 MG/DL — SIGNIFICANT CHANGE UP (ref 0.7–1.5)
EGFR: 96 ML/MIN/1.73M2 — SIGNIFICANT CHANGE UP
GLUCOSE SERPL-MCNC: 105 MG/DL — HIGH (ref 70–99)
HCT VFR BLD CALC: 45.5 % — SIGNIFICANT CHANGE UP (ref 42–52)
HGB BLD-MCNC: 15.4 G/DL — SIGNIFICANT CHANGE UP (ref 14–18)
MAGNESIUM SERPL-MCNC: 2.1 MG/DL — SIGNIFICANT CHANGE UP (ref 1.8–2.4)
MCHC RBC-ENTMCNC: 32.9 PG — HIGH (ref 27–31)
MCHC RBC-ENTMCNC: 33.8 G/DL — SIGNIFICANT CHANGE UP (ref 32–37)
MCV RBC AUTO: 97.2 FL — HIGH (ref 80–94)
NRBC # BLD: 0 /100 WBCS — SIGNIFICANT CHANGE UP (ref 0–0)
PLATELET # BLD AUTO: 222 K/UL — SIGNIFICANT CHANGE UP (ref 130–400)
PMV BLD: 10.9 FL — HIGH (ref 7.4–10.4)
POTASSIUM SERPL-MCNC: 4.7 MMOL/L — SIGNIFICANT CHANGE UP (ref 3.5–5)
POTASSIUM SERPL-SCNC: 4.7 MMOL/L — SIGNIFICANT CHANGE UP (ref 3.5–5)
RBC # BLD: 4.68 M/UL — LOW (ref 4.7–6.1)
RBC # FLD: 13.5 % — SIGNIFICANT CHANGE UP (ref 11.5–14.5)
SODIUM SERPL-SCNC: 143 MMOL/L — SIGNIFICANT CHANGE UP (ref 135–146)
WBC # BLD: 10.3 K/UL — SIGNIFICANT CHANGE UP (ref 4.8–10.8)
WBC # FLD AUTO: 10.3 K/UL — SIGNIFICANT CHANGE UP (ref 4.8–10.8)

## 2024-05-01 PROCEDURE — 99232 SBSQ HOSP IP/OBS MODERATE 35: CPT

## 2024-05-01 RX ORDER — LIDOCAINE 4 G/100G
1 CREAM TOPICAL EVERY 24 HOURS
Refills: 0 | Status: DISCONTINUED | OUTPATIENT
Start: 2024-05-01 | End: 2024-05-06

## 2024-05-01 RX ADMIN — ATORVASTATIN CALCIUM 40 MILLIGRAM(S): 80 TABLET, FILM COATED ORAL at 22:58

## 2024-05-01 RX ADMIN — Medication 650 MILLIGRAM(S): at 17:32

## 2024-05-01 RX ADMIN — Medication 100 MILLIGRAM(S): at 05:06

## 2024-05-01 RX ADMIN — CEFTRIAXONE 100 MILLIGRAM(S): 500 INJECTION, POWDER, FOR SOLUTION INTRAMUSCULAR; INTRAVENOUS at 17:32

## 2024-05-01 RX ADMIN — ENOXAPARIN SODIUM 40 MILLIGRAM(S): 100 INJECTION SUBCUTANEOUS at 22:58

## 2024-05-01 RX ADMIN — Medication 5 MILLIGRAM(S): at 22:58

## 2024-05-01 RX ADMIN — GABAPENTIN 100 MILLIGRAM(S): 400 CAPSULE ORAL at 13:56

## 2024-05-01 RX ADMIN — LIDOCAINE 1 PATCH: 4 CREAM TOPICAL at 13:22

## 2024-05-01 RX ADMIN — PANTOPRAZOLE SODIUM 40 MILLIGRAM(S): 20 TABLET, DELAYED RELEASE ORAL at 06:01

## 2024-05-01 RX ADMIN — Medication 100 MILLIGRAM(S): at 13:56

## 2024-05-01 RX ADMIN — GABAPENTIN 100 MILLIGRAM(S): 400 CAPSULE ORAL at 05:06

## 2024-05-01 RX ADMIN — Medication 25 MICROGRAM(S): at 05:06

## 2024-05-01 RX ADMIN — Medication 100 MILLIGRAM(S): at 22:58

## 2024-05-01 RX ADMIN — Medication 81 MILLIGRAM(S): at 13:22

## 2024-05-01 RX ADMIN — Medication 400 MILLIGRAM(S): at 04:24

## 2024-05-01 RX ADMIN — LIDOCAINE 1 PATCH: 4 CREAM TOPICAL at 19:30

## 2024-05-01 RX ADMIN — GABAPENTIN 100 MILLIGRAM(S): 400 CAPSULE ORAL at 22:58

## 2024-05-01 RX ADMIN — CHLORHEXIDINE GLUCONATE 15 MILLILITER(S): 213 SOLUTION TOPICAL at 06:01

## 2024-05-01 RX ADMIN — AMLODIPINE BESYLATE 5 MILLIGRAM(S): 2.5 TABLET ORAL at 05:06

## 2024-05-01 RX ADMIN — CHLORHEXIDINE GLUCONATE 15 MILLILITER(S): 213 SOLUTION TOPICAL at 18:32

## 2024-05-01 NOTE — PROGRESS NOTE ADULT - ASSESSMENT
ASSESSMENT  63 year old male PMHx HTN, HLD, COPD, Hypothyroidism, presents to the ED for left neck swelling.     IMPRESSION  #Dental infection #17 with deep neck infection s/p extraction  WBC 11  #Recent strep + (per patient, not in HIE)  #Abx allergy: penicillin (Hives)    Creatinine: 1.0 (04-30-24 @ 07:30)      Weight (kg): 90.7 (04-28-24 @ 13:58)    RECOMMENDATIONS  - Strep cx throat  - Ceftriaxone 2g q24h IV  - Clinda 900mg q8h IV   - Appreciate Dental/ENT consults  - Once clinically improving, anticipate D/C on PO levaquin 750 mg daily and PO flagyl 500mg TID to complete 14 days total of antibiotics   - Please obtain EKG to ensure QTC < 500 for fluoroquinolone therapy     If any questions, please send a message or call on [email or web address] Teams  Please continue to update ID with any pertinent new laboratory, radiographic findings, or change in clinical status

## 2024-05-01 NOTE — PROGRESS NOTE ADULT - ASSESSMENT
64 y/o man with PMH of HTN, hyperlipidemia, COPD, and hypothyroidism presented to the ED for left neck swelling and difficulty opening his jaw and eating.    []left submandibular edema/ Dental infection #17 with deep neck infection s/p extraction  #Leukocytosis improving   CT neck reported Rim-enhancing fluid collection along the inner margin of the left  mandibular body measuring 1 x 2 x 2 cm (TRV, AP, CC; ser 3 im 70)   consistent with abscess.   Extensive associated soft tissue swelling  including:  -Left  space edema and expansion.  -Left submandibular gland edema. -Phlegmon within the submandibular space with deviation of the floor of   mouth structures to the right. -Left oropharyngeal, supraglottic laryngeal and piriform sinus edema. The   left pyriform sinus is completely effaced. -The uvula is edematous.   2. Multiple reactive appearing left 1B and 2 lymph nodes.   3. Left mandibular and maxillary dental caries and periapical lucencies. Recommend correlation with dental exam.   4. Absent left thyroid lobe. Right thyroid lobe nodule measuring 2 cm, recommend thyroid sonogram for further evaluation.    - s/p extraction of tooth #17 with expression of pus on 4/29  - ENT following - requested IR eval of abscess drainage- as per IR  collection is not amenable for drainage.  get [facility] surgery consult   - continue with Clinda IV AND ceftriaxone   - dental follow up  - tolerating diet   f/u cultures   ESR 73   crp 174.6   get  Strep cx throat ( Recent strep + as per patient )   - peridex swish and spit q12  - pain control with motrin/tylenol     #Mild Transaminitis   - monitor and outpatient follow up if stable     # HTN/DL  - continue home meds    #hypothyroid  - on synthroid   op ENDOcrine eval for thyroid nodule and sono     #COPD   - not in exacerbation       Pending  labs,, [facility] eval and repeating cbc in am.       62 y/o man with PMH of HTN, hyperlipidemia, COPD, and hypothyroidism presented to the ED for left neck swelling and difficulty opening his jaw and eating.    []left submandibular edema/ Dental infection #17 with deep neck infection s/p extraction  #Leukocytosis improving   CT neck reported Rim-enhancing fluid collection along the inner margin of the left  mandibular body measuring 1 x 2 x 2 cm (TRV, AP, CC; ser 3 im 70)   consistent with abscess.   Extensive associated soft tissue swelling  including:  -Left  space edema and expansion.  -Left submandibular gland edema. -Phlegmon within the submandibular space with deviation of the floor of   mouth structures to the right. -Left oropharyngeal, supraglottic laryngeal and piriform sinus edema. The   left pyriform sinus is completely effaced. -The uvula is edematous.   2. Multiple reactive appearing left 1B and 2 lymph nodes.   3. Left mandibular and maxillary dental caries and periapical lucencies. Recommend correlation with dental exam.   4. Absent left thyroid lobe. Right thyroid lobe nodule measuring 2 cm, recommend thyroid sonogram for further evaluation.    - s/p extraction of tooth #17 with expression of pus on 4/29  - ENT following - requested IR eval of abscess drainage- as per IR  collection is not amenable for drainage.  get [facility] surgery consult   - continue with Clinda IV AND ceftriaxone   - dental follow up  - tolerating diet   f/u cultures   ESR 73   crp 174.6   get  Strep cx throat ( Recent strep + as per patient )   - peridex swish and spit q12  - pain control with motrin/tylenol     #Mild Transaminitis   - monitor and outpatient follow up if stable     # HTN/DL  - continue home meds    #hypothyroid  - on synthroid   op ENDOcrine eval for thyroid nodule and sono     #COPD   - not in exacerbation     []chronic feet arthrtitic pain which he uses voltaren gel and local steroid injection by podiatry as op   give lidocaine patch       Pending  labs,, [facility] eval and repeating cbc in am.

## 2024-05-01 NOTE — PROGRESS NOTE ADULT - ASSESSMENT
63 year old male PMHx HTN, HLD, COPD, Hypothyroidism, presents to the ED for left neck swelling after putting in dentures that don't fit well. He went to urgent care yesterday and was started on Azithro for strep throat. CT neck prelim showing infectious process without distinct drainable fluid collection. S/p #17 tooth extraction with pus expressed. ENT consulted, recommend IR drainage, neuro IR evaluated not amenable to collection.     # Left submandibular gland abscess  - no sepsis poa  - CT neck Rim-enhancing fluid collection along the inner margin of the left mandibular body measuring 1 x 2 x 2 cm (TRV, AP, CC; ser 3 im 70) consistent with abscess. Extensive associated soft tissue swelling including: -Left  space edema and expansion.-Left submandibular gland edema. -Phlegmon within the submandibular space with deviation of the floor of mouth structures to the right. -Left oropharyngeal, supraglottic laryngeal and piriform sinus edema. The left pyriform sinus is completely effaced. -The uvula is edematous.  - s/p Decadron x3 doses  - c/w IV Clindamycin  - c/w PPI  - Blood culture NGTD  - Dental consult appreciated - s/p non-surgical extraction of #17 completed, purulent material expressed  - Dental F/U with outpatient dentist for comprehensive dental care or [facility] dental clinic   - ENT consulted, recommend IR drainage of absecess  - Neuro IR consulted, Dr. Smith reviewed images not amenable for drainage  - ID consult appreciated  - ESR 73, .6  - Check Strep cx throat  - ADD ceftriaxone 2g q24h IV 4/30  - Change to Clinda 900mg q8h IV   - WBC trend 11.78--> 10.43--> 17.55 4/30 now 10.3  - OMFS consult for evaluation of potential drainage of abscess     # Transaminitis, mild  - AST 61, ALT 51 on admission  - no abdominal pain  - avoid hepatotoxic meds  - Outpatient follow up     # HTN  - c/w Amlodipine 5mg qd    # HLD  - c/w statin    # Hypothyroidism   - c/w Synthroid 25 mcg qd    #DVT ppx: Lovenox  #GI ppx: PPI  #Diet: as tolerated  #Activity: AAT  #Dispo: Med     63 year old male PMHx HTN, HLD, COPD, Hypothyroidism, presents to the ED for left neck swelling after putting in dentures that don't fit well. He went to urgent care yesterday and was started on Azithro for strep throat. CT neck prelim showing infectious process without distinct drainable fluid collection. S/p #17 tooth extraction with pus expressed. ENT consulted, recommend IR drainage, neuro IR evaluated not amenable to collection.     # Left submandibular gland abscess  - no sepsis poa  - CT neck Rim-enhancing fluid collection along the inner margin of the left mandibular body measuring 1 x 2 x 2 cm (TRV, AP, CC; ser 3 im 70) consistent with abscess. Extensive associated soft tissue swelling including: -Left  space edema and expansion.-Left submandibular gland edema. -Phlegmon within the submandibular space with deviation of the floor of mouth structures to the right. -Left oropharyngeal, supraglottic laryngeal and piriform sinus edema. The left pyriform sinus is completely effaced. -The uvula is edematous.  - s/p Decadron x3 doses  - c/w IV Clindamycin  - c/w PPI  - Blood culture NGTD  - Dental consult appreciated - s/p non-surgical extraction of #17 completed, purulent material expressed  - Dental F/U with outpatient dentist for comprehensive dental care or [facility] dental clinic   - ENT consulted, recommend IR drainage of absecess  - Neuro IR consulted, Dr. Smith reviewed images not amenable for drainage  - ID consult appreciated  - ESR 73, .6 -4/29  - Check Strep cx throat  - ADD ceftriaxone 2g q24h IV 4/30  - Change to Clinda 900mg q8h IV   - WBC trend 11.78--> 10.43--> 17.55 4/30 now 10.3  - OMFS consult for evaluation of potential drainage of abscess     # Transaminitis, mild  - AST 61, ALT 51 on admission  - no abdominal pain  - avoid hepatotoxic meds  - Outpatient follow up     # HTN  - c/w Amlodipine 5mg qd    # HLD  - c/w statin    # Hypothyroidism   - c/w Synthroid 25 mcg qd    #DVT ppx: Lovenox  #GI ppx: PPI  #Diet: as tolerated  #Activity: AAT  #Dispo: Med

## 2024-05-01 NOTE — PROGRESS NOTE ADULT - SUBJECTIVE AND OBJECTIVE BOX
T H I S   I S    N O  T   A    F I N A L I Z E D   N O T ADA ROMERO, LIGIA  63y, Male  Allergy: penicillin (Hives)    Hospital Day: 3d    Patient seen and examined earlier today.     PMH/PSH:  PAST MEDICAL & SURGICAL HISTORY:  HTN (hypertension)      HLD (hyperlipidemia)      Goiter      Emphysema/COPD  "mild" per pt      OA (osteoarthritis)      Back pain      Anxiety      Gallstones      COPD, mild      Hypothyroidism      H/O bilateral hip replacements      S/P inguinal hernia repair          LAST 24-Hr EVENTS:    VITALS:  T(F): 98 (05-01-24 @ 07:27), Max: 99.5 (05-01-24 @ 00:05)  HR: 69 (05-01-24 @ 07:27)  BP: 123/80 (05-01-24 @ 07:27) (102/62 - 123/80)  RR: 16 (05-01-24 @ 07:27)  SpO2: 95% (05-01-24 @ 07:27)          TESTS & MEASUREMENTS:  Weight/BMI  90.7 (04-28-24 @ 13:58)                          15.4   10.30 )-----------( 222      ( 01 May 2024 07:55 )             45.5         05-01    143  |  104  |  20  ----------------------------<  105<H>  4.7   |  28  |  0.9    Ca    8.7      01 May 2024 07:55  Mg     2.1     05-01    TPro  5.6<L>  /  Alb  3.3<L>  /  TBili  0.4  /  DBili  x   /  AST  48<H>  /  ALT  67<H>  /  AlkPhos  77  04-30    LIVER FUNCTIONS - ( 30 Apr 2024 07:30 )  Alb: 3.3 g/dL / Pro: 5.6 g/dL / ALK PHOS: 77 U/L / ALT: 67 U/L / AST: 48 U/L / GGT: x                 Culture - Blood (collected 04-29-24 @ 06:35)  Source: .Blood None  Preliminary Report (05-01-24 @ 14:01):    No growth at 48 Hours      Urinalysis Basic - ( 01 May 2024 07:55 )    Color: x / Appearance: x / SG: x / pH: x  Gluc: 105 mg/dL / Ketone: x  / Bili: x / Urobili: x   Blood: x / Protein: x / Nitrite: x   Leuk Esterase: x / RBC: x / WBC x   Sq Epi: x / Non Sq Epi: x / Bacteria: x                            RADIOLOGY, ECG, & ADDITIONAL TESTS:      RECENT DIAGNOSTIC ORDERS:  C-Reactive Protein: AM Sched. Collection: 02-May-2024 04:30 (05-01-24 @ 11:01)  Sedimentation Rate, Erythrocyte: AM Sched. Collection: 02-May-2024 04:30 (05-01-24 @ 11:01)  Magnesium: AM Sched. Collection: 02-May-2024 04:30 (05-01-24 @ 10:59)  Comprehensive Metabolic Panel: AM Sched. Collection: 02-May-2024 04:30 (05-01-24 @ 10:59)  Complete Blood Count + Automated Diff: AM Sched. Collection: 02-May-2024 04:30 (05-01-24 @ 10:59)  Culture - Group A Streptococcus: Routine  Specimen Source: Throat (04-30-24 @ 16:38)  Strep Throat by PCR: Routine (04-30-24 @ 16:38)      MEDICATIONS:  MEDICATIONS  (STANDING):  amLODIPine   Tablet 5 milliGRAM(s) Oral daily  aspirin  chewable 81 milliGRAM(s) Oral daily  atorvastatin 40 milliGRAM(s) Oral at bedtime  cefTRIAXone   IVPB      cefTRIAXone   IVPB 2000 milliGRAM(s) IV Intermittent every 24 hours  chlorhexidine 0.12% Liquid 15 milliLiter(s) Swish and Spit two times a day  clindamycin IVPB 900 milliGRAM(s) IV Intermittent every 8 hours  enoxaparin Injectable 40 milliGRAM(s) SubCutaneous every 24 hours  gabapentin 100 milliGRAM(s) Oral three times a day  levothyroxine 25 MICROGram(s) Oral daily  lidocaine   4% Patch 1 Patch Transdermal every 24 hours  melatonin 5 milliGRAM(s) Oral at bedtime  pantoprazole    Tablet 40 milliGRAM(s) Oral before breakfast    MEDICATIONS  (PRN):  acetaminophen     Tablet .. 650 milliGRAM(s) Oral every 6 hours PRN Temp greater or equal to 38C (100.4F), Mild Pain (1 - 3)  methocarbamol 750 milliGRAM(s) Oral every 8 hours PRN Muscle Spasm      HOME MEDICATIONS:  amLODIPine 5 mg oral tablet (04-28)  aspirin 81 mg oral tablet (02-23)  cyclobenzaprine 10 mg oral tablet (04-28)  gabapentin 100 mg oral tablet (02-23)  levothyroxine 25 mcg (0.025 mg) oral tablet (02-23)  methocarbamol 750 mg oral tablet (02-23)  rosuvastatin 10 mg oral tablet (02-23)      PHYSICAL EXAM:  GENERAL:   CHEST/LUNG:   HEART:   ABDOMEN:   EXTREMITIES:               HEATHER LIGIA  63y, Male  Allergy: penicillin (Hives)    Hospital Day: 3d    Patient seen and examined earlier today. he stated that his face feeling better but c/o chronic feet arthrtitic pain which he uses voltaren gel and local steroid injection by podiatry as op     PMH/PSH:  PAST MEDICAL & SURGICAL HISTORY:  HTN (hypertension)      HLD (hyperlipidemia)      Goiter      Emphysema/COPD  "mild" per pt      OA (osteoarthritis)      Back pain      Anxiety      Gallstones      COPD, mild      Hypothyroidism      H/O bilateral hip replacements      S/P inguinal hernia repair          LAST 24-Hr EVENTS:    VITALS:  T(F): 98 (05-01-24 @ 07:27), Max: 99.5 (05-01-24 @ 00:05)  HR: 69 (05-01-24 @ 07:27)  BP: 123/80 (05-01-24 @ 07:27) (102/62 - 123/80)  RR: 16 (05-01-24 @ 07:27)  SpO2: 95% (05-01-24 @ 07:27)          TESTS & MEASUREMENTS:  Weight/BMI  90.7 (04-28-24 @ 13:58)                          15.4   10.30 )-----------( 222      ( 01 May 2024 07:55 )             45.5         05-01    143  |  104  |  20  ----------------------------<  105<H>  4.7   |  28  |  0.9    Ca    8.7      01 May 2024 07:55  Mg     2.1     05-01    TPro  5.6<L>  /  Alb  3.3<L>  /  TBili  0.4  /  DBili  x   /  AST  48<H>  /  ALT  67<H>  /  AlkPhos  77  04-30    LIVER FUNCTIONS - ( 30 Apr 2024 07:30 )  Alb: 3.3 g/dL / Pro: 5.6 g/dL / ALK PHOS: 77 U/L / ALT: 67 U/L / AST: 48 U/L / GGT: x                 Culture - Blood (collected 04-29-24 @ 06:35)  Source: .Blood None  Preliminary Report (05-01-24 @ 14:01):    No growth at 48 Hours      Urinalysis Basic - ( 01 May 2024 07:55 )    Color: x / Appearance: x / SG: x / pH: x  Gluc: 105 mg/dL / Ketone: x  / Bili: x / Urobili: x   Blood: x / Protein: x / Nitrite: x   Leuk Esterase: x / RBC: x / WBC x   Sq Epi: x / Non Sq Epi: x / Bacteria: x                            RADIOLOGY, ECG, & ADDITIONAL TESTS:      RECENT DIAGNOSTIC ORDERS:  C-Reactive Protein: AM Sched. Collection: 02-May-2024 04:30 (05-01-24 @ 11:01)  Sedimentation Rate, Erythrocyte: AM Sched. Collection: 02-May-2024 04:30 (05-01-24 @ 11:01)  Magnesium: AM Sched. Collection: 02-May-2024 04:30 (05-01-24 @ 10:59)  Comprehensive Metabolic Panel: AM Sched. Collection: 02-May-2024 04:30 (05-01-24 @ 10:59)  Complete Blood Count + Automated Diff: AM Sched. Collection: 02-May-2024 04:30 (05-01-24 @ 10:59)  Culture - Group A Streptococcus: Routine  Specimen Source: Throat (04-30-24 @ 16:38)  Strep Throat by PCR: Routine (04-30-24 @ 16:38)      MEDICATIONS:  MEDICATIONS  (STANDING):  amLODIPine   Tablet 5 milliGRAM(s) Oral daily  aspirin  chewable 81 milliGRAM(s) Oral daily  atorvastatin 40 milliGRAM(s) Oral at bedtime  cefTRIAXone   IVPB      cefTRIAXone   IVPB 2000 milliGRAM(s) IV Intermittent every 24 hours  chlorhexidine 0.12% Liquid 15 milliLiter(s) Swish and Spit two times a day  clindamycin IVPB 900 milliGRAM(s) IV Intermittent every 8 hours  enoxaparin Injectable 40 milliGRAM(s) SubCutaneous every 24 hours  gabapentin 100 milliGRAM(s) Oral three times a day  levothyroxine 25 MICROGram(s) Oral daily  lidocaine   4% Patch 1 Patch Transdermal every 24 hours  melatonin 5 milliGRAM(s) Oral at bedtime  pantoprazole    Tablet 40 milliGRAM(s) Oral before breakfast    MEDICATIONS  (PRN):  acetaminophen     Tablet .. 650 milliGRAM(s) Oral every 6 hours PRN Temp greater or equal to 38C (100.4F), Mild Pain (1 - 3)  methocarbamol 750 milliGRAM(s) Oral every 8 hours PRN Muscle Spasm      HOME MEDICATIONS:  amLODIPine 5 mg oral tablet (04-28)  aspirin 81 mg oral tablet (02-23)  cyclobenzaprine 10 mg oral tablet (04-28)  gabapentin 100 mg oral tablet (02-23)  levothyroxine 25 mcg (0.025 mg) oral tablet (02-23)  methocarbamol 750 mg oral tablet (02-23)  rosuvastatin 10 mg oral tablet (02-23)      PHYSICAL EXAM:  On exam  General: awake, alert, NAD, left lower cheek swilling and sumandibular area, non tender   Lungs:  clear to ausculations b/l, normal resp effort  Heart: regular ryhthm   Abdomen: soft, non tender non distended  Ext: no edema, can move all  his extremities , no focal tender on feet

## 2024-05-01 NOTE — PROGRESS NOTE ADULT - SUBJECTIVE AND OBJECTIVE BOX
TONIALIGIA PIERRE  63y, Male  Allergy: penicillin (Hives)      LOS  3d    CHIEF COMPLAINT: neck pain (29 Apr 2024 12:07)      INTERVAL EVENTS/HPI  - No acute events overnight, feels a bit worse today  - T(F): , Max: 99.5 (05-01-24 @ 00:05)  - Denies any worsening symptoms  - Tolerating medication  - WBC Count: 10.30 (05-01-24 @ 07:55) downtrending   WBC Count: 17.55 (04-30-24 @ 07:30)     - Creatinine: 0.9 (05-01-24 @ 07:55)  Creatinine: 1.0 (04-30-24 @ 07:30)       ROS  General: Denies rigors, nightsweats  HEENT: Denies headache, rhinorrhea, sore throat, eye pain  CV: Denies CP, palpitations  PULM: Denies wheezing, hemoptysis  GI: Denies hematemesis, hematochezia, melena  : Denies discharge, hematuria  MSK: Denies arthralgias, myalgias  SKIN: Denies rash, lesions  NEURO: Denies paresthesias, weakness  PSYCH: Denies depression, anxiety    VITALS:  T(F): 98, Max: 99.5 (05-01-24 @ 00:05)  HR: 69  BP: 123/80  RR: 16Vital Signs Last 24 Hrs  T(C): 36.7 (01 May 2024 07:27), Max: 37.5 (01 May 2024 00:05)  T(F): 98 (01 May 2024 07:27), Max: 99.5 (01 May 2024 00:05)  HR: 69 (01 May 2024 07:27) (61 - 73)  BP: 123/80 (01 May 2024 07:27) (102/62 - 123/80)  BP(mean): --  RR: 16 (01 May 2024 07:27) (16 - 18)  SpO2: 95% (01 May 2024 07:27) (95% - 95%)    Parameters below as of 01 May 2024 07:27  Patient On (Oxygen Delivery Method): room air        PHYSICAL EXAM:  Gen: NAD, resting in bed  HEENT: Normocephalic, atraumatic +trismus, L neck decreased erythema/edema   Neck: supple, no lymphadenopathy  CV: Regular rate & regular rhythm  Lungs: decreased BS at bases, no fremitus  Abdomen: Soft, BS present  Ext: Warm, well perfused  Neuro: non focal, awake  Skin: no rash, no erythema  Lines: no phlebitis    FH: Non-contributory  Social Hx: Non-contributory    TESTS & MEASUREMENTS:                        15.4   10.30 )-----------( 222      ( 01 May 2024 07:55 )             45.5     05-01    143  |  104  |  20  ----------------------------<  105<H>  4.7   |  28  |  0.9    Ca    8.7      01 May 2024 07:55  Mg     2.1     05-01    TPro  5.6<L>  /  Alb  3.3<L>  /  TBili  0.4  /  DBili  x   /  AST  48<H>  /  ALT  67<H>  /  AlkPhos  77  04-30      LIVER FUNCTIONS - ( 30 Apr 2024 07:30 )  Alb: 3.3 g/dL / Pro: 5.6 g/dL / ALK PHOS: 77 U/L / ALT: 67 U/L / AST: 48 U/L / GGT: x           Urinalysis Basic - ( 01 May 2024 07:55 )    Color: x / Appearance: x / SG: x / pH: x  Gluc: 105 mg/dL / Ketone: x  / Bili: x / Urobili: x   Blood: x / Protein: x / Nitrite: x   Leuk Esterase: x / RBC: x / WBC x   Sq Epi: x / Non Sq Epi: x / Bacteria: x        Culture - Blood (collected 04-29-24 @ 06:35)  Source: .Blood None  Preliminary Report (05-01-24 @ 14:01):    No growth at 48 Hours        Lactate, Blood: 1.3 mmol/L (04-28-24 @ 15:27)      INFECTIOUS DISEASES TESTING      INFLAMMATORY MARKERS  Sedimentation Rate, Erythrocyte: 73 mm/Hr (04-29-24 @ 06:35)  C-Reactive Protein: 174.6 mg/L (04-29-24 @ 06:35)      RADIOLOGY & ADDITIONAL TESTS:  I have personally reviewed the last available Chest xray  CXR      CT      CARDIOLOGY TESTING      MEDICATIONS  amLODIPine   Tablet 5 Oral daily  aspirin  chewable 81 Oral daily  atorvastatin 40 Oral at bedtime  cefTRIAXone   IVPB     cefTRIAXone   IVPB 2000 IV Intermittent every 24 hours  chlorhexidine 0.12% Liquid 15 Swish and Spit two times a day  clindamycin IVPB 900 IV Intermittent every 8 hours  enoxaparin Injectable 40 SubCutaneous every 24 hours  gabapentin 100 Oral three times a day  levothyroxine 25 Oral daily  lidocaine   4% Patch 1 Transdermal every 24 hours  melatonin 5 Oral at bedtime  pantoprazole    Tablet 40 Oral before breakfast      WEIGHT  Weight (kg): 90.7 (04-28-24 @ 13:58)  Creatinine: 0.9 mg/dL (05-01-24 @ 07:55)      ANTIBIOTICS:  cefTRIAXone   IVPB 2000 milliGRAM(s) IV Intermittent every 24 hours  cefTRIAXone   IVPB      clindamycin IVPB 900 milliGRAM(s) IV Intermittent every 8 hours      All available historical records have been reviewed

## 2024-05-01 NOTE — PROGRESS NOTE ADULT - SUBJECTIVE AND OBJECTIVE BOX
24H events:    Patient is a 63y old Male who presents with a chief complaint of neck pain (29 Apr 2024 12:07)    Primary diagnosis of Cellulitis of neck      Today is 3d of hospitalization. This morning patient was seen and examined at bedside, resting comfortably in bed.   No acute or major events overnight. Patient reports some generalized weakness/fatigue, feeling like something wasn't right yesterday. Patient denies fevers, chills, headache, chest pain, dyspnea, cough, nausea, vomiting, abdominal pain or BM changes.     Code Status: Full      PAST MEDICAL & SURGICAL HISTORY  HTN (hypertension)    HLD (hyperlipidemia)    Goiter    Emphysema/COPD  "mild" per pt    OA (osteoarthritis)    Back pain    Anxiety    Gallstones    COPD, mild    Hypothyroidism    H/O bilateral hip replacements    S/P inguinal hernia repair      SOCIAL HISTORY:  Social History:      ALLERGIES:  penicillin (Hives)    MEDICATIONS:  STANDING MEDICATIONS  amLODIPine   Tablet 5 milliGRAM(s) Oral daily  aspirin  chewable 81 milliGRAM(s) Oral daily  atorvastatin 40 milliGRAM(s) Oral at bedtime  cefTRIAXone   IVPB 2000 milliGRAM(s) IV Intermittent every 24 hours  cefTRIAXone   IVPB      chlorhexidine 0.12% Liquid 15 milliLiter(s) Swish and Spit two times a day  clindamycin IVPB 900 milliGRAM(s) IV Intermittent every 8 hours  enoxaparin Injectable 40 milliGRAM(s) SubCutaneous every 24 hours  gabapentin 100 milliGRAM(s) Oral three times a day  levothyroxine 25 MICROGram(s) Oral daily  lidocaine   4% Patch 1 Patch Transdermal every 24 hours  melatonin 5 milliGRAM(s) Oral at bedtime  pantoprazole    Tablet 40 milliGRAM(s) Oral before breakfast    PRN MEDICATIONS  acetaminophen     Tablet .. 650 milliGRAM(s) Oral every 6 hours PRN  methocarbamol 750 milliGRAM(s) Oral every 8 hours PRN    VITALS:   T(F): 98  HR: 69  BP: 123/80  RR: 16  SpO2: 95%    PHYSICAL EXAM:  GENERAL:   ( x) NAD, lying in bed comfortably     (  ) obtunded     (  ) lethargic     (  ) somnolent    HEAD: left sided submandibular swelling  ( x) Atraumatic     (  ) hematoma     (  ) laceration (specify location:       )     NECK:  (x) Supple     (  ) neck stiffness     (  ) nuchal rigidity     (  )  no JVD     (  ) JVD present ( -- cm)    HEART:  Rate -->     (x) normal rate     (  ) bradycardic     (  ) tachycardic  Rhythm -->     (x) regular     (  ) regularly irregular     (  ) irregularly irregular  Murmurs -->     (x) normal s1s2     (  ) systolic murmur     (  ) diastolic murmur     (  ) continuous murmur      (  ) S3 present     (  ) S4 present    LUNGS:   ( x)Unlabored respirations     (  ) tachypnea  ( x) B/L air entry     (  ) decreased breath sounds in:  (location     )    ( x) no adventitious sound     (  ) crackles     (  ) wheezing      (  ) rhonchi      (specify location:       )  (  ) chest wall tenderness (specify location:       )    ABDOMEN:   ( x) Soft     (  ) tense   |   (X  ) nondistended     (  ) distended   |   ( X ) +BS     (  ) hypoactive bowel sounds     (  ) hyperactive bowel sounds  ( x) nontender     (  ) RUQ tenderness     (  ) RLQ tenderness     (  ) LLQ tenderness     (  ) epigastric tenderness     (  ) diffuse tenderness  (  ) Splenomegaly      (  ) Hepatomegaly      (  ) Jaundice     (  ) ecchymosis     EXTREMITIES:  ( x) Normal     (  ) Rash     (  ) ecchymosis     (  ) varicose veins      (  ) pitting edema     (  ) non-pitting edema   (  ) ulceration     (  ) gangrene:     (location:     )    NERVOUS SYSTEM:    ( x) A&Ox3     (  ) confused     (  ) lethargic  CN II-XII:     (  ) Intact     (  ) deficits found     (Specify:     )   Upper extremities:     (  ) no sensorimotor deficits     (  ) weakness     (  ) loss of proprioception/vibration     (  ) loss of touch/temperature (specify:    )  Lower extremities:     (  ) no sensorimotor deficits     (  ) weakness     (  ) loss of proprioception/vibration     (  ) loss of touch/temperature (specify:    )    SKIN:   ( X ) No rashes or lesions     (  ) maculopapular rash     (  ) pustules     (  ) vesicles     (  ) ulcer     (  ) ecchymosis     (specify location:     )      LABS:                        15.4   10.30 )-----------( 222      ( 01 May 2024 07:55 )             45.5     05-01    143  |  104  |  20  ----------------------------<  105<H>  4.7   |  28  |  0.9    Ca    8.7      01 May 2024 07:55  Mg     2.1     05-01    TPro  5.6<L>  /  Alb  3.3<L>  /  TBili  0.4  /  DBili  x   /  AST  48<H>  /  ALT  67<H>  /  AlkPhos  77  04-30      Urinalysis Basic - ( 01 May 2024 07:55 )    Color: x / Appearance: x / SG: x / pH: x  Gluc: 105 mg/dL / Ketone: x  / Bili: x / Urobili: x   Blood: x / Protein: x / Nitrite: x   Leuk Esterase: x / RBC: x / WBC x   Sq Epi: x / Non Sq Epi: x / Bacteria: x            Culture - Blood (collected 29 Apr 2024 06:35)  Source: .Blood None  Preliminary Report (30 Apr 2024 14:02):    No growth at 24 hours          RADIOLOGY:

## 2024-05-02 LAB
ALBUMIN SERPL ELPH-MCNC: 3.4 G/DL — LOW (ref 3.5–5.2)
ALP SERPL-CCNC: 76 U/L — SIGNIFICANT CHANGE UP (ref 30–115)
ALT FLD-CCNC: 53 U/L — HIGH (ref 0–41)
ANION GAP SERPL CALC-SCNC: 13 MMOL/L — SIGNIFICANT CHANGE UP (ref 7–14)
AST SERPL-CCNC: 31 U/L — SIGNIFICANT CHANGE UP (ref 0–41)
BASOPHILS # BLD AUTO: 0.03 K/UL — SIGNIFICANT CHANGE UP (ref 0–0.2)
BASOPHILS NFR BLD AUTO: 0.3 % — SIGNIFICANT CHANGE UP (ref 0–1)
BILIRUB SERPL-MCNC: 0.5 MG/DL — SIGNIFICANT CHANGE UP (ref 0.2–1.2)
BUN SERPL-MCNC: 13 MG/DL — SIGNIFICANT CHANGE UP (ref 10–20)
CALCIUM SERPL-MCNC: 8.6 MG/DL — SIGNIFICANT CHANGE UP (ref 8.4–10.5)
CHLORIDE SERPL-SCNC: 103 MMOL/L — SIGNIFICANT CHANGE UP (ref 98–110)
CO2 SERPL-SCNC: 25 MMOL/L — SIGNIFICANT CHANGE UP (ref 17–32)
CREAT SERPL-MCNC: 0.7 MG/DL — SIGNIFICANT CHANGE UP (ref 0.7–1.5)
CRP SERPL-MCNC: 132.7 MG/L — HIGH
EGFR: 104 ML/MIN/1.73M2 — SIGNIFICANT CHANGE UP
EOSINOPHIL # BLD AUTO: 0.08 K/UL — SIGNIFICANT CHANGE UP (ref 0–0.7)
EOSINOPHIL NFR BLD AUTO: 0.9 % — SIGNIFICANT CHANGE UP (ref 0–8)
ERYTHROCYTE [SEDIMENTATION RATE] IN BLOOD: 88 MM/HR — HIGH (ref 0–10)
GLUCOSE SERPL-MCNC: 118 MG/DL — HIGH (ref 70–99)
HCT VFR BLD CALC: 45.3 % — SIGNIFICANT CHANGE UP (ref 42–52)
HGB BLD-MCNC: 15.5 G/DL — SIGNIFICANT CHANGE UP (ref 14–18)
IMM GRANULOCYTES NFR BLD AUTO: 0.4 % — HIGH (ref 0.1–0.3)
LYMPHOCYTES # BLD AUTO: 2.58 K/UL — SIGNIFICANT CHANGE UP (ref 1.2–3.4)
LYMPHOCYTES # BLD AUTO: 28 % — SIGNIFICANT CHANGE UP (ref 20.5–51.1)
MAGNESIUM SERPL-MCNC: 2.2 MG/DL — SIGNIFICANT CHANGE UP (ref 1.8–2.4)
MCHC RBC-ENTMCNC: 32.9 PG — HIGH (ref 27–31)
MCHC RBC-ENTMCNC: 34.2 G/DL — SIGNIFICANT CHANGE UP (ref 32–37)
MCV RBC AUTO: 96.2 FL — HIGH (ref 80–94)
MONOCYTES # BLD AUTO: 0.85 K/UL — HIGH (ref 0.1–0.6)
MONOCYTES NFR BLD AUTO: 9.2 % — SIGNIFICANT CHANGE UP (ref 1.7–9.3)
NEUTROPHILS # BLD AUTO: 5.62 K/UL — SIGNIFICANT CHANGE UP (ref 1.4–6.5)
NEUTROPHILS NFR BLD AUTO: 61.2 % — SIGNIFICANT CHANGE UP (ref 42.2–75.2)
NRBC # BLD: 0 /100 WBCS — SIGNIFICANT CHANGE UP (ref 0–0)
PLATELET # BLD AUTO: 212 K/UL — SIGNIFICANT CHANGE UP (ref 130–400)
PMV BLD: 9.9 FL — SIGNIFICANT CHANGE UP (ref 7.4–10.4)
POTASSIUM SERPL-MCNC: 4.3 MMOL/L — SIGNIFICANT CHANGE UP (ref 3.5–5)
POTASSIUM SERPL-SCNC: 4.3 MMOL/L — SIGNIFICANT CHANGE UP (ref 3.5–5)
PROT SERPL-MCNC: 5.8 G/DL — LOW (ref 6–8)
RBC # BLD: 4.71 M/UL — SIGNIFICANT CHANGE UP (ref 4.7–6.1)
RBC # FLD: 13.2 % — SIGNIFICANT CHANGE UP (ref 11.5–14.5)
SODIUM SERPL-SCNC: 141 MMOL/L — SIGNIFICANT CHANGE UP (ref 135–146)
URATE SERPL-MCNC: 4.3 MG/DL — SIGNIFICANT CHANGE UP (ref 3.4–8.8)
WBC # BLD: 9.2 K/UL — SIGNIFICANT CHANGE UP (ref 4.8–10.8)
WBC # FLD AUTO: 9.2 K/UL — SIGNIFICANT CHANGE UP (ref 4.8–10.8)

## 2024-05-02 PROCEDURE — 73620 X-RAY EXAM OF FOOT: CPT | Mod: 26,RT

## 2024-05-02 PROCEDURE — 99232 SBSQ HOSP IP/OBS MODERATE 35: CPT

## 2024-05-02 RX ORDER — KETOROLAC TROMETHAMINE 30 MG/ML
10 SYRINGE (ML) INJECTION ONCE
Refills: 0 | Status: COMPLETED | OUTPATIENT
Start: 2024-05-02 | End: 2024-05-02

## 2024-05-02 RX ORDER — KETOROLAC TROMETHAMINE 30 MG/ML
15 SYRINGE (ML) INJECTION ONCE
Refills: 0 | Status: DISCONTINUED | OUTPATIENT
Start: 2024-05-02 | End: 2024-05-02

## 2024-05-02 RX ORDER — IBUPROFEN 200 MG
400 TABLET ORAL EVERY 8 HOURS
Refills: 0 | Status: COMPLETED | OUTPATIENT
Start: 2024-05-02 | End: 2024-05-03

## 2024-05-02 RX ADMIN — Medication 650 MILLIGRAM(S): at 14:13

## 2024-05-02 RX ADMIN — Medication 400 MILLIGRAM(S): at 22:35

## 2024-05-02 RX ADMIN — Medication 15 MILLIGRAM(S): at 09:53

## 2024-05-02 RX ADMIN — AMLODIPINE BESYLATE 5 MILLIGRAM(S): 2.5 TABLET ORAL at 07:04

## 2024-05-02 RX ADMIN — Medication 100 MILLIGRAM(S): at 07:00

## 2024-05-02 RX ADMIN — LIDOCAINE 1 PATCH: 4 CREAM TOPICAL at 20:14

## 2024-05-02 RX ADMIN — CEFTRIAXONE 100 MILLIGRAM(S): 500 INJECTION, POWDER, FOR SOLUTION INTRAMUSCULAR; INTRAVENOUS at 15:34

## 2024-05-02 RX ADMIN — PANTOPRAZOLE SODIUM 40 MILLIGRAM(S): 20 TABLET, DELAYED RELEASE ORAL at 07:01

## 2024-05-02 RX ADMIN — Medication 400 MILLIGRAM(S): at 22:02

## 2024-05-02 RX ADMIN — ATORVASTATIN CALCIUM 40 MILLIGRAM(S): 80 TABLET, FILM COATED ORAL at 22:02

## 2024-05-02 RX ADMIN — Medication 5 MILLIGRAM(S): at 22:02

## 2024-05-02 RX ADMIN — Medication 25 MICROGRAM(S): at 07:01

## 2024-05-02 RX ADMIN — Medication 650 MILLIGRAM(S): at 15:23

## 2024-05-02 RX ADMIN — CHLORHEXIDINE GLUCONATE 15 MILLILITER(S): 213 SOLUTION TOPICAL at 17:22

## 2024-05-02 RX ADMIN — GABAPENTIN 100 MILLIGRAM(S): 400 CAPSULE ORAL at 14:10

## 2024-05-02 RX ADMIN — CHLORHEXIDINE GLUCONATE 15 MILLILITER(S): 213 SOLUTION TOPICAL at 07:08

## 2024-05-02 RX ADMIN — LIDOCAINE 1 PATCH: 4 CREAM TOPICAL at 01:30

## 2024-05-02 RX ADMIN — ENOXAPARIN SODIUM 40 MILLIGRAM(S): 100 INJECTION SUBCUTANEOUS at 22:03

## 2024-05-02 RX ADMIN — LIDOCAINE 1 PATCH: 4 CREAM TOPICAL at 14:11

## 2024-05-02 RX ADMIN — Medication 100 MILLIGRAM(S): at 22:02

## 2024-05-02 RX ADMIN — Medication 15 MILLIGRAM(S): at 09:04

## 2024-05-02 RX ADMIN — GABAPENTIN 100 MILLIGRAM(S): 400 CAPSULE ORAL at 22:02

## 2024-05-02 RX ADMIN — GABAPENTIN 100 MILLIGRAM(S): 400 CAPSULE ORAL at 07:04

## 2024-05-02 RX ADMIN — Medication 100 MILLIGRAM(S): at 14:10

## 2024-05-02 NOTE — PROGRESS NOTE ADULT - ASSESSMENT
62 y/o man with PMH of HTN, hyperlipidemia, COPD, and hypothyroidism presented to the ED for left neck swelling and difficulty opening his jaw and eating.    []left submandibular edema/ Dental infection #17 with deep neck infection s/p extraction  #Leukocytosis improving   CT neck reported Rim-enhancing fluid collection along the inner margin of the left  mandibular body measuring 1 x 2 x 2 cm (TRV, AP, CC; ser 3 im 70)   consistent with abscess.   Extensive associated soft tissue swelling  including:  -Left  space edema and expansion.  -Left submandibular gland edema. -Phlegmon within the submandibular space with deviation of the floor of   mouth structures to the right. -Left oropharyngeal, supraglottic laryngeal and piriform sinus edema. The   left pyriform sinus is completely effaced. -The uvula is edematous.   2. Multiple reactive appearing left 1B and 2 lymph nodes.   3. Left mandibular and maxillary dental caries and periapical lucencies. Recommend correlation with dental exam.   4. Absent left thyroid lobe. Right thyroid lobe nodule measuring 2 cm, recommend thyroid sonogram for further evaluation.    - s/p extraction of tooth #17 with expression of pus on 4/29  - ENT following - requested IR eval of abscess drainage- as per IR  collection is not amenable for drainage.  get [facility] surgery consult appreciated s/p Incision and drainage of intraoral performed by OMFS on 5/2, with 3 cc of purulence drained and submitted for culture.  - continue with Clinda IV AND ceftriaxone   - dental follow up  - tolerating diet   f/u cultures including new culture from today   ESR 73   crp 174.6   get  Strep cx throat ( Recent strep + as per patient )   - peridex swish and spit q12  - pain control with motrin/tylenol     #Mild Transaminitis   - monitor and outpatient follow up if stable     # HTN/DL  - continue home meds    #hypothyroid  - on synthroid   op ENDOcrine eval for thyroid nodule and sono     #COPD   - not in exacerbation     []chronic feet arthrtitic pain which he uses voltaren gel and local steroid injection by podiatry as op   give lidocaine patch   Pt with worsnein pain   consult podiatry , x ray foot , uric acid   Pain improved after toradol , give ibuprofen for now       Pending  labs,, cultures , podiatry

## 2024-05-02 NOTE — PROGRESS NOTE ADULT - ASSESSMENT
63 year old male PMHx HTN, HLD, COPD, Hypothyroidism, presents to the ED for left neck swelling after putting in dentures that don't fit well. He went to urgent care yesterday and was started on Azithro for strep throat. CT neck prelim showing infectious process without distinct drainable fluid collection. S/p #17 tooth extraction with pus expressed. ENT consulted, recommend IR drainage, neuro IR evaluated not amenable to collection. OMFS consulted for further evaluation. Patient with history of OA in foot with monthly steroid injections, complaining of worsening forefoot pain with associated redness and swelling.     # Left submandibular gland abscess  - no sepsis poa  - CT neck Rim-enhancing fluid collection along the inner margin of the left mandibular body measuring 1 x 2 x 2 cm (TRV, AP, CC; ser 3 im 70) consistent with abscess. Extensive associated soft tissue swelling including: -Left  space edema and expansion.-Left submandibular gland edema. -Phlegmon within the submandibular space with deviation of the floor of mouth structures to the right. -Left oropharyngeal, supraglottic laryngeal and piriform sinus edema. The left pyriform sinus is completely effaced. -The uvula is edematous.  - s/p Decadron x3 doses  - c/w IV Clindamycin  - c/w PPI  - Blood culture NGTD  - Dental consult appreciated - s/p non-surgical extraction of #17 completed, purulent material expressed  - Dental F/U with outpatient dentist for comprehensive dental care or [facility] dental clinic   - ENT consulted, recommend IR drainage of absecess  - Neuro IR consulted, Dr. Smith reviewed images not amenable for drainage  - ID consult appreciated  - ESR 73, .6 -4/29  - ESR 88,  .7 - 5/2  - Check Strep cx throat - RN reports sample sent to lab  - ADD ceftriaxone 2g q24h IV 4/30  - Increased Clinda 900mg q8h IV   - WBC trend 11.78--> 10.43--> 17.55 --> 10.3 >9.20  - OMFS consult for evaluation of potential drainage of abscess - to eval in dental clinic 5/2 for drainage    #Right foot pain  #OA?  - Foot with new swelling and redness, concerning for gout vs infection  - Uric acid 4.3  - Check xray foot  - Podiatry consult  - Toradol x1 dose and see response, will likely need NSAIDs    # Transaminitis, mild  - AST 61, ALT 51 on admission  - no abdominal pain  - avoid hepatotoxic meds  - LFTs improving  - Outpatient follow up     # HTN  - c/w Amlodipine 5mg qd    # HLD  - c/w statin    # Hypothyroidism   - c/w Synthroid 25 mcg qd    #DVT ppx: Lovenox  #GI ppx: PPI  #Diet: as tolerated  #Activity: AAT  #Dispo: Med    #Pending: dental/OMFS re-eval, podiatry eval

## 2024-05-02 NOTE — PROGRESS NOTE ADULT - SUBJECTIVE AND OBJECTIVE BOX
24H events:    Patient is a 63y old Male who presents with a chief complaint of neck pain (29 Apr 2024 12:07)    Primary diagnosis of Cellulitis of neck      Today is 4d of hospitalization. This morning patient was seen and examined at bedside, resting comfortably in bed.  No acute or major events overnight. Patient was complaining of R foot pain, states history of OA managed by Podiatry with steroid injections but missed injection this month. Reports swelling is improved in neck but hard for him to say, couldn't see his face in the bathroom 2/2 foot pain.     Code Status: Full        PAST MEDICAL & SURGICAL HISTORY  HTN (hypertension)    HLD (hyperlipidemia)    Goiter    Emphysema/COPD  "mild" per pt    OA (osteoarthritis)    Back pain    Anxiety    Gallstones    COPD, mild    Hypothyroidism    H/O bilateral hip replacements    S/P inguinal hernia repair      SOCIAL HISTORY:  Social History:      ALLERGIES:  penicillin (Hives)    MEDICATIONS:  STANDING MEDICATIONS  amLODIPine   Tablet 5 milliGRAM(s) Oral daily  aspirin  chewable 81 milliGRAM(s) Oral daily  atorvastatin 40 milliGRAM(s) Oral at bedtime  cefTRIAXone   IVPB      cefTRIAXone   IVPB 2000 milliGRAM(s) IV Intermittent every 24 hours  chlorhexidine 0.12% Liquid 15 milliLiter(s) Swish and Spit two times a day  clindamycin IVPB 900 milliGRAM(s) IV Intermittent every 8 hours  enoxaparin Injectable 40 milliGRAM(s) SubCutaneous every 24 hours  gabapentin 100 milliGRAM(s) Oral three times a day  levothyroxine 25 MICROGram(s) Oral daily  lidocaine   4% Patch 1 Patch Transdermal every 24 hours  melatonin 5 milliGRAM(s) Oral at bedtime  pantoprazole    Tablet 40 milliGRAM(s) Oral before breakfast    PRN MEDICATIONS  acetaminophen     Tablet .. 650 milliGRAM(s) Oral every 6 hours PRN  methocarbamol 750 milliGRAM(s) Oral every 8 hours PRN    VITALS:   T(F): 98.3  HR: 63  BP: 136/76  RR: 20  SpO2: 95%    PHYSICAL EXAM:  GENERAL:   ( x) NAD, lying in bed comfortably     (  ) obtunded     (  ) lethargic     (  ) somnolent    HEAD: left sided submandibular swelling  ( x) Atraumatic     (  ) hematoma     (  ) laceration (specify location:       )     NECK:  (x) Supple     (  ) neck stiffness     (  ) nuchal rigidity     (  )  no JVD     (  ) JVD present ( -- cm)    HEART:  Rate -->     (x) normal rate     (  ) bradycardic     (  ) tachycardic  Rhythm -->     (x) regular     (  ) regularly irregular     (  ) irregularly irregular  Murmurs -->     (x) normal s1s2     (  ) systolic murmur     (  ) diastolic murmur     (  ) continuous murmur      (  ) S3 present     (  ) S4 present    LUNGS:   ( x)Unlabored respirations     (  ) tachypnea  ( x) B/L air entry     (  ) decreased breath sounds in:  (location     )    ( x) no adventitious sound     (  ) crackles     (  ) wheezing      (  ) rhonchi      (specify location:       )  (  ) chest wall tenderness (specify location:       )    ABDOMEN:   ( x) Soft     (  ) tense   |   (X  ) nondistended     (  ) distended   |   ( X ) +BS     (  ) hypoactive bowel sounds     (  ) hyperactive bowel sounds  ( x) nontender     (  ) RUQ tenderness     (  ) RLQ tenderness     (  ) LLQ tenderness     (  ) epigastric tenderness     (  ) diffuse tenderness  (  ) Splenomegaly      (  ) Hepatomegaly      (  ) Jaundice     (  ) ecchymosis     EXTREMITIES:  Right forefood with redness and swelling noted  (  ) Normal     (  ) Rash     (  ) ecchymosis     (  ) varicose veins      (  ) pitting edema     (  ) non-pitting edema   (  ) ulceration     (  ) gangrene:     (location:     )    NERVOUS SYSTEM:    ( x) A&Ox3     (  ) confused     (  ) lethargic  CN II-XII:     (  ) Intact     (  ) deficits found     (Specify:     )   Upper extremities:     (  ) no sensorimotor deficits     (  ) weakness     (  ) loss of proprioception/vibration     (  ) loss of touch/temperature (specify:    )  Lower extremities:     (  ) no sensorimotor deficits     (  ) weakness     (  ) loss of proprioception/vibration     (  ) loss of touch/temperature (specify:    )    SKIN:   ( X ) No rashes or lesions     (  ) maculopapular rash     (  ) pustules     (  ) vesicles     (  ) ulcer     (  ) ecchymosis     (specify location:     )    LABS:                        15.5   9.20  )-----------( 212      ( 02 May 2024 07:17 )             45.3     05-02    141  |  103  |  13  ----------------------------<  118<H>  4.3   |  25  |  0.7    Ca    8.6      02 May 2024 07:17  Mg     2.2     05-02    TPro  5.8<L>  /  Alb  3.4<L>  /  TBili  0.5  /  DBili  x   /  AST  31  /  ALT  53<H>  /  AlkPhos  76  05-02      Urinalysis Basic - ( 02 May 2024 07:17 )    Color: x / Appearance: x / SG: x / pH: x  Gluc: 118 mg/dL / Ketone: x  / Bili: x / Urobili: x   Blood: x / Protein: x / Nitrite: x   Leuk Esterase: x / RBC: x / WBC x   Sq Epi: x / Non Sq Epi: x / Bacteria: x        Sedimentation Rate, Erythrocyte: 88 mm/Hr *H* (05-02-24 @ 07:17)          RADIOLOGY:

## 2024-05-02 NOTE — PROGRESS NOTE ADULT - SUBJECTIVE AND OBJECTIVE BOX
T H I S   I S    N O  T   A    F I N A L I Z E D   N O T ADA ROMERO, LIGIA  63y, Male  Allergy: penicillin (Hives)    Hospital Day: 4d    Patient seen and examined earlier today.     PMH/PSH:  PAST MEDICAL & SURGICAL HISTORY:  HTN (hypertension)      HLD (hyperlipidemia)      Goiter      Emphysema/COPD  "mild" per pt      OA (osteoarthritis)      Back pain      Anxiety      Gallstones      COPD, mild      Hypothyroidism      H/O bilateral hip replacements      S/P inguinal hernia repair          LAST 24-Hr EVENTS:    VITALS:  T(F): 98.3 (05-02-24 @ 07:30), Max: 98.4 (05-01-24 @ 23:26)  HR: 63 (05-02-24 @ 07:30)  BP: 136/76 (05-02-24 @ 07:30) (133/76 - 147/79)  RR: 20 (05-02-24 @ 07:30)  SpO2: 95% (05-02-24 @ 07:30)          TESTS & MEASUREMENTS:  Weight/BMI  90.7 (04-28-24 @ 13:58)    05-01-24 @ 07:01  -  05-02-24 @ 07:00  --------------------------------------------------------  IN: 0 mL / OUT: 700 mL / NET: -700 mL                            15.5   9.20  )-----------( 212      ( 02 May 2024 07:17 )             45.3         05-02    141  |  103  |  13  ----------------------------<  118<H>  4.3   |  25  |  0.7    Ca    8.6      02 May 2024 07:17  Mg     2.2     05-02    TPro  5.8<L>  /  Alb  3.4<L>  /  TBili  0.5  /  DBili  x   /  AST  31  /  ALT  53<H>  /  AlkPhos  76  05-02    LIVER FUNCTIONS - ( 02 May 2024 07:17 )  Alb: 3.4 g/dL / Pro: 5.8 g/dL / ALK PHOS: 76 U/L / ALT: 53 U/L / AST: 31 U/L / GGT: x                 Culture - Blood (collected 04-29-24 @ 06:35)  Source: .Blood None  Preliminary Report (05-02-24 @ 14:01):    No growth at 72 Hours      Urinalysis Basic - ( 02 May 2024 07:17 )    Color: x / Appearance: x / SG: x / pH: x  Gluc: 118 mg/dL / Ketone: x  / Bili: x / Urobili: x   Blood: x / Protein: x / Nitrite: x   Leuk Esterase: x / RBC: x / WBC x   Sq Epi: x / Non Sq Epi: x / Bacteria: x                            RADIOLOGY, ECG, & ADDITIONAL TESTS:      RECENT DIAGNOSTIC ORDERS:  Culture - Abscess with Gram Stain: Routine  Specimen Source: Oral cavity (05-02-24 @ 12:46)  Xray Foot AP + Lateral, Right: Routine   Indication: forefoot swelling  Transport: Wheelchair  Exam Completed (05-02-24 @ 08:35)  Strep Throat by PCR: Routine (05-02-24 @ 08:26)      MEDICATIONS:  MEDICATIONS  (STANDING):  amLODIPine   Tablet 5 milliGRAM(s) Oral daily  aspirin  chewable 81 milliGRAM(s) Oral daily  atorvastatin 40 milliGRAM(s) Oral at bedtime  cefTRIAXone   IVPB      cefTRIAXone   IVPB 2000 milliGRAM(s) IV Intermittent every 24 hours  chlorhexidine 0.12% Liquid 15 milliLiter(s) Swish and Spit two times a day  clindamycin IVPB 900 milliGRAM(s) IV Intermittent every 8 hours  enoxaparin Injectable 40 milliGRAM(s) SubCutaneous every 24 hours  gabapentin 100 milliGRAM(s) Oral three times a day  ibuprofen  Tablet. 400 milliGRAM(s) Oral every 8 hours  levothyroxine 25 MICROGram(s) Oral daily  lidocaine   4% Patch 1 Patch Transdermal every 24 hours  melatonin 5 milliGRAM(s) Oral at bedtime  pantoprazole    Tablet 40 milliGRAM(s) Oral before breakfast    MEDICATIONS  (PRN):  acetaminophen     Tablet .. 650 milliGRAM(s) Oral every 6 hours PRN Temp greater or equal to 38C (100.4F), Mild Pain (1 - 3)  methocarbamol 750 milliGRAM(s) Oral every 8 hours PRN Muscle Spasm      HOME MEDICATIONS:  amLODIPine 5 mg oral tablet (04-28)  aspirin 81 mg oral tablet (02-23)  cyclobenzaprine 10 mg oral tablet (04-28)  gabapentin 100 mg oral tablet (02-23)  levothyroxine 25 mcg (0.025 mg) oral tablet (02-23)  methocarbamol 750 mg oral tablet (02-23)  rosuvastatin 10 mg oral tablet (02-23)      PHYSICAL EXAM:  GENERAL:   CHEST/LUNG:   HEART:   ABDOMEN:   EXTREMITIES:           HEATHERLIGIA  63y, Male  Allergy: penicillin (Hives)    Hospital Day: 4d    Patient seen and examined earlier today.     PMH/PSH:  PAST MEDICAL & SURGICAL HISTORY:  HTN (hypertension)      HLD (hyperlipidemia)      Goiter      Emphysema/COPD  "mild" per pt      OA (osteoarthritis)      Back pain      Anxiety      Gallstones      COPD, mild      Hypothyroidism      H/O bilateral hip replacements      S/P inguinal hernia repair          LAST 24-Hr EVENTS:    VITALS:  T(F): 98.3 (05-02-24 @ 07:30), Max: 98.4 (05-01-24 @ 23:26)  HR: 63 (05-02-24 @ 07:30)  BP: 136/76 (05-02-24 @ 07:30) (133/76 - 147/79)  RR: 20 (05-02-24 @ 07:30)  SpO2: 95% (05-02-24 @ 07:30)          TESTS & MEASUREMENTS:  Weight/BMI  90.7 (04-28-24 @ 13:58)    05-01-24 @ 07:01  -  05-02-24 @ 07:00  --------------------------------------------------------  IN: 0 mL / OUT: 700 mL / NET: -700 mL                            15.5   9.20  )-----------( 212      ( 02 May 2024 07:17 )             45.3         05-02    141  |  103  |  13  ----------------------------<  118<H>  4.3   |  25  |  0.7    Ca    8.6      02 May 2024 07:17  Mg     2.2     05-02    TPro  5.8<L>  /  Alb  3.4<L>  /  TBili  0.5  /  DBili  x   /  AST  31  /  ALT  53<H>  /  AlkPhos  76  05-02    LIVER FUNCTIONS - ( 02 May 2024 07:17 )  Alb: 3.4 g/dL / Pro: 5.8 g/dL / ALK PHOS: 76 U/L / ALT: 53 U/L / AST: 31 U/L / GGT: x                 Culture - Blood (collected 04-29-24 @ 06:35)  Source: .Blood None  Preliminary Report (05-02-24 @ 14:01):    No growth at 72 Hours      Urinalysis Basic - ( 02 May 2024 07:17 )    Color: x / Appearance: x / SG: x / pH: x  Gluc: 118 mg/dL / Ketone: x  / Bili: x / Urobili: x   Blood: x / Protein: x / Nitrite: x   Leuk Esterase: x / RBC: x / WBC x   Sq Epi: x / Non Sq Epi: x / Bacteria: x                            RADIOLOGY, ECG, & ADDITIONAL TESTS:      RECENT DIAGNOSTIC ORDERS:  Culture - Abscess with Gram Stain: Routine  Specimen Source: Oral cavity (05-02-24 @ 12:46)  Xray Foot AP + Lateral, Right: Routine   Indication: forefoot swelling  Transport: Wheelchair  Exam Completed (05-02-24 @ 08:35)  Strep Throat by PCR: Routine (05-02-24 @ 08:26)      MEDICATIONS:  MEDICATIONS  (STANDING):  amLODIPine   Tablet 5 milliGRAM(s) Oral daily  aspirin  chewable 81 milliGRAM(s) Oral daily  atorvastatin 40 milliGRAM(s) Oral at bedtime  cefTRIAXone   IVPB      cefTRIAXone   IVPB 2000 milliGRAM(s) IV Intermittent every 24 hours  chlorhexidine 0.12% Liquid 15 milliLiter(s) Swish and Spit two times a day  clindamycin IVPB 900 milliGRAM(s) IV Intermittent every 8 hours  enoxaparin Injectable 40 milliGRAM(s) SubCutaneous every 24 hours  gabapentin 100 milliGRAM(s) Oral three times a day  ibuprofen  Tablet. 400 milliGRAM(s) Oral every 8 hours  levothyroxine 25 MICROGram(s) Oral daily  lidocaine   4% Patch 1 Patch Transdermal every 24 hours  melatonin 5 milliGRAM(s) Oral at bedtime  pantoprazole    Tablet 40 milliGRAM(s) Oral before breakfast    MEDICATIONS  (PRN):  acetaminophen     Tablet .. 650 milliGRAM(s) Oral every 6 hours PRN Temp greater or equal to 38C (100.4F), Mild Pain (1 - 3)  methocarbamol 750 milliGRAM(s) Oral every 8 hours PRN Muscle Spasm      HOME MEDICATIONS:  amLODIPine 5 mg oral tablet (04-28)  aspirin 81 mg oral tablet (02-23)  cyclobenzaprine 10 mg oral tablet (04-28)  gabapentin 100 mg oral tablet (02-23)  levothyroxine 25 mcg (0.025 mg) oral tablet (02-23)  methocarbamol 750 mg oral tablet (02-23)  rosuvastatin 10 mg oral tablet (02-23)      PHYSICAL EXAM:  On exam  General: awake, alert, NAD, left lower cheek swilling and sumandibular area, non tender   Lungs:  clear to ausculations b/l, normal resp effort  Heart: regular ryhthm   Abdomen: soft, non tender non distended  Ext: no edema, can move all  his extremities , R feet dorsal aspect mid area with mild tender but some redness and mild localozed edema noted

## 2024-05-03 ENCOUNTER — TRANSCRIPTION ENCOUNTER (OUTPATIENT)
Age: 64
End: 2024-05-03

## 2024-05-03 LAB
ALBUMIN SERPL ELPH-MCNC: 3.4 G/DL — LOW (ref 3.5–5.2)
ALP SERPL-CCNC: 72 U/L — SIGNIFICANT CHANGE UP (ref 30–115)
ALT FLD-CCNC: 60 U/L — HIGH (ref 0–41)
ANION GAP SERPL CALC-SCNC: 13 MMOL/L — SIGNIFICANT CHANGE UP (ref 7–14)
ANION GAP SERPL CALC-SCNC: 18 MMOL/L — HIGH (ref 7–14)
AST SERPL-CCNC: 37 U/L — SIGNIFICANT CHANGE UP (ref 0–41)
BASOPHILS # BLD AUTO: 0.05 K/UL — SIGNIFICANT CHANGE UP (ref 0–0.2)
BASOPHILS NFR BLD AUTO: 0.6 % — SIGNIFICANT CHANGE UP (ref 0–1)
BILIRUB SERPL-MCNC: 0.3 MG/DL — SIGNIFICANT CHANGE UP (ref 0.2–1.2)
BUN SERPL-MCNC: 17 MG/DL — SIGNIFICANT CHANGE UP (ref 10–20)
BUN SERPL-MCNC: 22 MG/DL — HIGH (ref 10–20)
CALCIUM SERPL-MCNC: 8.9 MG/DL — SIGNIFICANT CHANGE UP (ref 8.4–10.5)
CALCIUM SERPL-MCNC: 9.1 MG/DL — SIGNIFICANT CHANGE UP (ref 8.4–10.4)
CHLORIDE SERPL-SCNC: 103 MMOL/L — SIGNIFICANT CHANGE UP (ref 98–110)
CHLORIDE SERPL-SCNC: 104 MMOL/L — SIGNIFICANT CHANGE UP (ref 98–110)
CO2 SERPL-SCNC: 15 MMOL/L — LOW (ref 17–32)
CO2 SERPL-SCNC: 24 MMOL/L — SIGNIFICANT CHANGE UP (ref 17–32)
CREAT SERPL-MCNC: 0.9 MG/DL — SIGNIFICANT CHANGE UP (ref 0.7–1.5)
CREAT SERPL-MCNC: 1 MG/DL — SIGNIFICANT CHANGE UP (ref 0.7–1.5)
CRP SERPL-MCNC: 107.9 MG/L — HIGH
CULTURE RESULTS: SIGNIFICANT CHANGE UP
EGFR: 85 ML/MIN/1.73M2 — SIGNIFICANT CHANGE UP
EGFR: 96 ML/MIN/1.73M2 — SIGNIFICANT CHANGE UP
EOSINOPHIL # BLD AUTO: 0.35 K/UL — SIGNIFICANT CHANGE UP (ref 0–0.7)
EOSINOPHIL NFR BLD AUTO: 4.1 % — SIGNIFICANT CHANGE UP (ref 0–8)
GLUCOSE SERPL-MCNC: 107 MG/DL — HIGH (ref 70–99)
GLUCOSE SERPL-MCNC: 97 MG/DL — SIGNIFICANT CHANGE UP (ref 70–99)
GRAM STN FLD: ABNORMAL
HCT VFR BLD CALC: 44.5 % — SIGNIFICANT CHANGE UP (ref 42–52)
HGB BLD-MCNC: 15 G/DL — SIGNIFICANT CHANGE UP (ref 14–18)
IMM GRANULOCYTES NFR BLD AUTO: 0.8 % — HIGH (ref 0.1–0.3)
LYMPHOCYTES # BLD AUTO: 3.24 K/UL — SIGNIFICANT CHANGE UP (ref 1.2–3.4)
LYMPHOCYTES # BLD AUTO: 38.1 % — SIGNIFICANT CHANGE UP (ref 20.5–51.1)
MAGNESIUM SERPL-MCNC: 2.4 MG/DL — SIGNIFICANT CHANGE UP (ref 1.8–2.4)
MCHC RBC-ENTMCNC: 32.8 PG — HIGH (ref 27–31)
MCHC RBC-ENTMCNC: 33.7 G/DL — SIGNIFICANT CHANGE UP (ref 32–37)
MCV RBC AUTO: 97.2 FL — HIGH (ref 80–94)
MONOCYTES # BLD AUTO: 0.8 K/UL — HIGH (ref 0.1–0.6)
MONOCYTES NFR BLD AUTO: 9.4 % — HIGH (ref 1.7–9.3)
NEUTROPHILS # BLD AUTO: 4 K/UL — SIGNIFICANT CHANGE UP (ref 1.4–6.5)
NEUTROPHILS NFR BLD AUTO: 47 % — SIGNIFICANT CHANGE UP (ref 42.2–75.2)
NRBC # BLD: 0 /100 WBCS — SIGNIFICANT CHANGE UP (ref 0–0)
PLATELET # BLD AUTO: 232 K/UL — SIGNIFICANT CHANGE UP (ref 130–400)
PMV BLD: 10.1 FL — SIGNIFICANT CHANGE UP (ref 7.4–10.4)
POTASSIUM SERPL-MCNC: 5.1 MMOL/L — HIGH (ref 3.5–5)
POTASSIUM SERPL-MCNC: 5.8 MMOL/L — HIGH (ref 3.5–5)
POTASSIUM SERPL-SCNC: 5.1 MMOL/L — HIGH (ref 3.5–5)
POTASSIUM SERPL-SCNC: 5.8 MMOL/L — HIGH (ref 3.5–5)
PROT SERPL-MCNC: 6 G/DL — SIGNIFICANT CHANGE UP (ref 6–8)
RBC # BLD: 4.58 M/UL — LOW (ref 4.7–6.1)
RBC # FLD: 13.2 % — SIGNIFICANT CHANGE UP (ref 11.5–14.5)
S PYO DNA THROAT QL NAA+PROBE: SIGNIFICANT CHANGE UP
SODIUM SERPL-SCNC: 136 MMOL/L — SIGNIFICANT CHANGE UP (ref 135–146)
SODIUM SERPL-SCNC: 141 MMOL/L — SIGNIFICANT CHANGE UP (ref 135–146)
SPECIMEN SOURCE: SIGNIFICANT CHANGE UP
SPECIMEN SOURCE: SIGNIFICANT CHANGE UP
WBC # BLD: 8.51 K/UL — SIGNIFICANT CHANGE UP (ref 4.8–10.8)
WBC # FLD AUTO: 8.51 K/UL — SIGNIFICANT CHANGE UP (ref 4.8–10.8)

## 2024-05-03 PROCEDURE — 93010 ELECTROCARDIOGRAM REPORT: CPT

## 2024-05-03 PROCEDURE — 99232 SBSQ HOSP IP/OBS MODERATE 35: CPT

## 2024-05-03 RX ORDER — GABAPENTIN 400 MG/1
0 CAPSULE ORAL
Qty: 0 | Refills: 0 | DISCHARGE

## 2024-05-03 RX ORDER — METHOCARBAMOL 500 MG/1
1 TABLET, FILM COATED ORAL
Qty: 0 | Refills: 0 | DISCHARGE
Start: 2024-05-03

## 2024-05-03 RX ORDER — AMLODIPINE BESYLATE 2.5 MG/1
1 TABLET ORAL
Qty: 0 | Refills: 0 | DISCHARGE
Start: 2024-05-03

## 2024-05-03 RX ORDER — LEVOTHYROXINE SODIUM 125 MCG
1 TABLET ORAL
Qty: 0 | Refills: 0 | DISCHARGE

## 2024-05-03 RX ORDER — AMLODIPINE BESYLATE 2.5 MG/1
1 TABLET ORAL
Refills: 0 | DISCHARGE

## 2024-05-03 RX ORDER — ACETAMINOPHEN 500 MG
2 TABLET ORAL
Qty: 0 | Refills: 0 | DISCHARGE
Start: 2024-05-03

## 2024-05-03 RX ORDER — SODIUM ZIRCONIUM CYCLOSILICATE 10 G/10G
5 POWDER, FOR SUSPENSION ORAL ONCE
Refills: 0 | Status: COMPLETED | OUTPATIENT
Start: 2024-05-03 | End: 2024-05-03

## 2024-05-03 RX ORDER — METHOCARBAMOL 500 MG/1
0 TABLET, FILM COATED ORAL
Qty: 0 | Refills: 0 | DISCHARGE

## 2024-05-03 RX ORDER — MORPHINE SULFATE 50 MG/1
1 CAPSULE, EXTENDED RELEASE ORAL ONCE
Refills: 0 | Status: DISCONTINUED | OUTPATIENT
Start: 2024-05-03 | End: 2024-05-03

## 2024-05-03 RX ORDER — IBUPROFEN 200 MG
1 TABLET ORAL
Qty: 0 | Refills: 0 | DISCHARGE
Start: 2024-05-03

## 2024-05-03 RX ORDER — LEVOTHYROXINE SODIUM 125 MCG
1 TABLET ORAL
Qty: 0 | Refills: 0 | DISCHARGE
Start: 2024-05-03

## 2024-05-03 RX ORDER — ASPIRIN/CALCIUM CARB/MAGNESIUM 324 MG
1 TABLET ORAL
Qty: 0 | Refills: 0 | DISCHARGE
Start: 2024-05-03

## 2024-05-03 RX ORDER — GABAPENTIN 400 MG/1
1 CAPSULE ORAL
Qty: 0 | Refills: 0 | DISCHARGE
Start: 2024-05-03

## 2024-05-03 RX ORDER — ASPIRIN/CALCIUM CARB/MAGNESIUM 324 MG
0 TABLET ORAL
Qty: 0 | Refills: 0 | DISCHARGE

## 2024-05-03 RX ADMIN — ENOXAPARIN SODIUM 40 MILLIGRAM(S): 100 INJECTION SUBCUTANEOUS at 22:01

## 2024-05-03 RX ADMIN — LIDOCAINE 1 PATCH: 4 CREAM TOPICAL at 23:25

## 2024-05-03 RX ADMIN — CHLORHEXIDINE GLUCONATE 15 MILLILITER(S): 213 SOLUTION TOPICAL at 06:40

## 2024-05-03 RX ADMIN — GABAPENTIN 100 MILLIGRAM(S): 400 CAPSULE ORAL at 21:37

## 2024-05-03 RX ADMIN — Medication 25 MICROGRAM(S): at 06:33

## 2024-05-03 RX ADMIN — METHOCARBAMOL 750 MILLIGRAM(S): 500 TABLET, FILM COATED ORAL at 21:50

## 2024-05-03 RX ADMIN — Medication 400 MILLIGRAM(S): at 06:32

## 2024-05-03 RX ADMIN — Medication 400 MILLIGRAM(S): at 14:36

## 2024-05-03 RX ADMIN — ATORVASTATIN CALCIUM 40 MILLIGRAM(S): 80 TABLET, FILM COATED ORAL at 21:37

## 2024-05-03 RX ADMIN — MORPHINE SULFATE 1 MILLIGRAM(S): 50 CAPSULE, EXTENDED RELEASE ORAL at 12:06

## 2024-05-03 RX ADMIN — Medication 650 MILLIGRAM(S): at 09:15

## 2024-05-03 RX ADMIN — Medication 5 MILLIGRAM(S): at 21:37

## 2024-05-03 RX ADMIN — Medication 650 MILLIGRAM(S): at 08:49

## 2024-05-03 RX ADMIN — SODIUM ZIRCONIUM CYCLOSILICATE 5 GRAM(S): 10 POWDER, FOR SUSPENSION ORAL at 16:01

## 2024-05-03 RX ADMIN — Medication 100 MILLIGRAM(S): at 18:40

## 2024-05-03 RX ADMIN — Medication 81 MILLIGRAM(S): at 12:18

## 2024-05-03 RX ADMIN — LIDOCAINE 1 PATCH: 4 CREAM TOPICAL at 12:18

## 2024-05-03 RX ADMIN — Medication 100 MILLIGRAM(S): at 06:34

## 2024-05-03 RX ADMIN — LIDOCAINE 1 PATCH: 4 CREAM TOPICAL at 02:00

## 2024-05-03 RX ADMIN — GABAPENTIN 100 MILLIGRAM(S): 400 CAPSULE ORAL at 16:03

## 2024-05-03 RX ADMIN — GABAPENTIN 100 MILLIGRAM(S): 400 CAPSULE ORAL at 06:34

## 2024-05-03 RX ADMIN — METHOCARBAMOL 750 MILLIGRAM(S): 500 TABLET, FILM COATED ORAL at 08:49

## 2024-05-03 RX ADMIN — Medication 100 MILLIGRAM(S): at 21:36

## 2024-05-03 RX ADMIN — CHLORHEXIDINE GLUCONATE 15 MILLILITER(S): 213 SOLUTION TOPICAL at 18:40

## 2024-05-03 RX ADMIN — PANTOPRAZOLE SODIUM 40 MILLIGRAM(S): 20 TABLET, DELAYED RELEASE ORAL at 06:33

## 2024-05-03 RX ADMIN — CEFTRIAXONE 100 MILLIGRAM(S): 500 INJECTION, POWDER, FOR SOLUTION INTRAMUSCULAR; INTRAVENOUS at 21:36

## 2024-05-03 RX ADMIN — Medication 650 MILLIGRAM(S): at 21:50

## 2024-05-03 RX ADMIN — AMLODIPINE BESYLATE 5 MILLIGRAM(S): 2.5 TABLET ORAL at 06:35

## 2024-05-03 NOTE — DISCHARGE NOTE PROVIDER - CARE PROVIDER_API CALL
Cl Fleming  Podiatric Medicine and Surgery  [address], [address]  [place] [address]  Phone: [phone]  Fax: [phone]  Follow Up Time: 2 weeks

## 2024-05-03 NOTE — DISCHARGE NOTE PROVIDER - HOSPITAL COURSE
63 year old male PMHx HTN, HLD, COPD, Hypothyroidism, presents to the ED for left neck swelling. Pt tried putting his dentures in that he has not worn for a long time. He reports pain and "nerve damage" since putting them in 1 week ago. 2 days ago he developed a sore throat and low grade fevers so he went to urgent care and was started on Azithro for strep throat. Pt reports difficulty opening his jaw and eating. He also reports worsening swelling on the left side of his neck so he came to the ED. Denies chest pain, SOB, dysphagia, N/V/D. T 99.3, HR 91, /94, RR 18, 99% on RA. WBC 12, AST 61, ALT 51, lactate 1.3. CT neck prelim: Indistinct hypodensity with local mass effect measuring approximately 2.5 x 2.1 adjacent to the left submandibular gland suspicious for infectious process without distinct drainable fluid collection. Received Decadron and Clindamycin  Admit to Medicine. Seen by fatmata and s/p infected tooth #17 removal with purulence expressed. Patient improved clinically, CT scan showing Rim-enhancing fluid collection along the inner margin of the left mandibular body measuring 1 x 2 x 2 cm (TRV, AP, CC; ser 3 im 70) consistent with abscess with extensive associated soft tissue swelling including. ENT consulted, recommended IR for drainage, scans reviewed not ammenable to IR drainage. ID was consulted, antibiotics broadened with ceftriaxone and clinda. OMFS consulted, s/p I&D with 3cc of purulent drainage expressed and sent to lab. Residual limited jaw opening likely reactive inflammation from the procedure - expected to resolve. C/w peridex BID for 1-2 weeks and soft diet advance as tolerated. Patientn will need outpatient dental follow up in 1 week. Patient with R foot pain with history of OA, podiatry consulted, xray consistent with severe OA of right forefoot. Follow up outpatient for MRI foot. WBAT w/ walker. Patient hemodynamically stable for discharge.     # Left submandibular gland abscess  - no sepsis poa  - CT neck Rim-enhancing fluid collection along the inner margin of the left mandibular body measuring 1 x 2 x 2 cm (TRV, AP, CC; ser 3 im 70) consistent with abscess. Extensive associated soft tissue swelling including: -Left  space edema and expansion.-Left submandibular gland edema. -Phlegmon within the submandibular space with deviation of the floor of mouth structures to the right. -Left oropharyngeal, supraglottic laryngeal and piriform sinus edema. The left pyriform sinus is completely effaced. -The uvula is edematous.  - s/p Decadron x3 doses  - S/p IV Clindamycin  - c/w PPI  - Blood culture NGTD  - Dental consult appreciated - s/p non-surgical extraction of #17 completed, purulent material expressed  - Dental F/U with outpatient dentist for comprehensive dental care or [facility] dental clinic   - ENT consulted, recommend IR drainage of absecess - Neuro IR consulted images not amenable for drainage  - OMFS appreciated - s/p I&D with 3cc purulent drainage sent and cultures  - ID consult appreciated  - Once clinically improving, anticipate D/C on PO levaquin 750 mg daily and PO flagyl 500mg TID to complete 14 days total of antibiotics       #Right foot pain  #OA?  - Uric acid 4.3  - Foot with new swelling and redness, concerning for gout vs infection  - xray foot No acute fracture or dislocation. Severe osteoarthritis of the midfoot and talonavicular joints, increased since June 2018, with dorsal midfoot soft tissue swelling overlying the increased dorsal osteophytes, likely related to increased joint effusion. Findings can reflect progression of neuropathic osteoarthropathy. Stable osteoarthritis of the forefoot and tibiotalar joints. Chronic ossification in the region of the plantar fascia consistent with prior inflammation.  - Podiatry consult appreciated - WBAT with walker  - Recommend Right Foot MRI -> outpatient.   - Continuation of steroid and pain control   - Patient to follow up in Dr. Fleming outpatient clinic.     # Transaminitis, mild  - AST 61, ALT 51 on admission  - no abdominal pain  - avoid hepatotoxic meds  - Outpatient follow up     # HTN  - c/w Amlodipine 5mg qd    # HLD  - c/w statin    # Hypothyroidism   - c/w Synthroid 25 mcg qd 63 year old male PMHx HTN, HLD, COPD, Hypothyroidism, presents to the ED for left neck swelling. Pt tried putting his dentures in that he has not worn for a long time. He reports pain and "nerve damage" since putting them in 1 week ago. 2 days ago he developed a sore throat and low grade fevers so he went to urgent care and was started on Azithro for strep throat. Pt reports difficulty opening his jaw and eating. He also reports worsening swelling on the left side of his neck so he came to the ED. Denies chest pain, SOB, dysphagia, N/V/D. T 99.3, HR 91, /94, RR 18, 99% on RA. WBC 12, AST 61, ALT 51, lactate 1.3. CT neck prelim: Indistinct hypodensity with local mass effect measuring approximately 2.5 x 2.1 adjacent to the left submandibular gland suspicious for infectious process without distinct drainable fluid collection. Received Decadron and Clindamycin  Admit to Medicine. Seen by fatmata and s/p infected tooth #17 removal with purulence expressed. Patient improved clinically, CT scan showing Rim-enhancing fluid collection along the inner margin of the left mandibular body measuring 1 x 2 x 2 cm (TRV, AP, CC; ser 3 im 70) consistent with abscess with extensive associated soft tissue swelling including. ENT consulted, recommended IR for drainage, scans reviewed not ammenable to IR drainage. ID was consulted, antibiotics broadened with ceftriaxone and clinda. OMFS consulted, s/p I&D with 3cc of purulent drainage expressed and sent to lab. Residual limited jaw opening likely reactive inflammation from the procedure - expected to resolve. C/w peridex BID for 1-2 weeks and soft diet advance as tolerated. Patientn will need outpatient dental follow up in 1 week. Patient with R foot pain with history of OA, podiatry consulted, xray consistent with severe OA of right forefoot. Follow up outpatient for MRI foot. WBAT w/ walker. Patient hemodynamically stable for discharge.     # Left submandibular gland abscess  - no sepsis poa  - CT neck Rim-enhancing fluid collection along the inner margin of the left mandibular body measuring 1 x 2 x 2 cm (TRV, AP, CC; ser 3 im 70) consistent with abscess. Extensive associated soft tissue swelling including: -Left  space edema and expansion.-Left submandibular gland edema. -Phlegmon within the submandibular space with deviation of the floor of mouth structures to the right. -Left oropharyngeal, supraglottic laryngeal and piriform sinus edema. The left pyriform sinus is completely effaced. -The uvula is edematous.  - s/p Decadron x3 doses  - S/p IV Clindamycin  - c/w PPI  - Blood culture NGTD  - Dental consult appreciated - s/p non-surgical extraction of #17 completed, purulent material expressed  - Dental F/U with outpatient dentist for comprehensive dental care or [facility] dental clinic   - ENT consulted, recommend IR drainage of absecess - Neuro IR consulted images not amenable for drainage  - OMFS appreciated - s/p I&D with 3cc purulent drainage sent and cultures c/w normal oral joanne  - ID consult appreciated  - Once clinically improving, anticipate D/C on PO levaquin 750 mg daily and PO flagyl 500mg TID to complete 14 days total of antibiotics until 5/14      #Right foot pain  #OA?  - Uric acid 4.3  - Foot with new swelling and redness, concerning for gout vs infection  - xray foot No acute fracture or dislocation. Severe osteoarthritis of the midfoot and talonavicular joints, increased since June 2018, with dorsal midfoot soft tissue swelling overlying the increased dorsal osteophytes, likely related to increased joint effusion. Findings can reflect progression of neuropathic osteoarthropathy. Stable osteoarthritis of the forefoot and tibiotalar joints. Chronic ossification in the region of the plantar fascia consistent with prior inflammation.  - Podiatry consult appreciated - WBAT with walker  - Recommend Right Foot MRI -> outpatient.   - Continuation of steroid and pain control   - Patient to follow up in Dr. Fleming outpatient clinic.     # Transaminitis, mild  - AST 61, ALT 51 on admission  - no abdominal pain  - avoid hepatotoxic meds  - Outpatient follow up     # HTN  - c/w Amlodipine 5mg qd    # HLD  - c/w statin    # Hypothyroidism   - c/w Synthroid 25 mcg qd 63 year old male PMHx HTN, HLD, COPD, Hypothyroidism, presents to the ED for left neck swelling. Pt tried putting his dentures in that he has not worn for a long time. He reports pain and "nerve damage" since putting them in 1 week ago. 2 days ago he developed a sore throat and low grade fevers so he went to urgent care and was started on Azithro for strep throat. Pt reports difficulty opening his jaw and eating. He also reports worsening swelling on the left side of his neck so he came to the ED. Denies chest pain, SOB, dysphagia, N/V/D. T 99.3, HR 91, /94, RR 18, 99% on RA. WBC 12, AST 61, ALT 51, lactate 1.3. CT neck prelim: Indistinct hypodensity with local mass effect measuring approximately 2.5 x 2.1 adjacent to the left submandibular gland suspicious for infectious process without distinct drainable fluid collection. Received Decadron and Clindamycin  Admit to Medicine. Seen by fatmata and s/p infected tooth #17 removal with purulence expressed. Patient improved clinically, CT scan showing Rim-enhancing fluid collection along the inner margin of the left mandibular body measuring 1 x 2 x 2 cm (TRV, AP, CC; ser 3 im 70) consistent with abscess with extensive associated soft tissue swelling including. ENT consulted, recommended IR for drainage, scans reviewed not ammenable to IR drainage. ID was consulted, antibiotics broadened with ceftriaxone and clinda. OMFS consulted, s/p I&D with 3cc of purulent drainage expressed and sent to lab. Residual limited jaw opening likely reactive inflammation from the procedure - expected to resolve. C/w peridex BID for 1-2 weeks and soft diet advance as tolerated. Patientn will need outpatient dental follow up in 1 week. Patient with R foot pain with history of OA, podiatry consulted, xray consistent with severe OA of right forefoot. Follow up outpatient for MRI foot. WBAT w/ walker. Patient hemodynamically stable for discharge.     # Left submandibular gland abscess  - no sepsis poa  - CT neck Rim-enhancing fluid collection along the inner margin of the left mandibular body measuring 1 x 2 x 2 cm (TRV, AP, CC; ser 3 im 70) consistent with abscess. Extensive associated soft tissue swelling including: -Left  space edema and expansion.-Left submandibular gland edema. -Phlegmon within the submandibular space with deviation of the floor of mouth structures to the right. -Left oropharyngeal, supraglottic laryngeal and piriform sinus edema. The left pyriform sinus is completely effaced. -The uvula is edematous.  - s/p Decadron x3 doses  - S/p IV Clindamycin  - c/w PPI  - Blood culture NGTD  - Dental consult appreciated - s/p non-surgical extraction of #17 completed, purulent material expressed  - Dental F/U with outpatient dentist for comprehensive dental care or [facility] dental clinic   - ENT consulted, recommend IR drainage of absecess - Neuro IR consulted images not amenable for drainage  - OMFS appreciated - s/p I&D with 3cc purulent drainage sent and cultures c/w normal oral joanne  - ID consult appreciated  - Once clinically improving, anticipate D/C on PO levaquin 750 mg daily and PO flagyl 500mg TID to complete 14 days total of antibiotics until 5/14      #Right foot pain  #OA?  - Uric acid 4.3  - Foot with new swelling and redness, concerning for gout vs infection  - xray foot No acute fracture or dislocation. Severe osteoarthritis of the midfoot and talonavicular joints, increased since June 2018, with dorsal midfoot soft tissue swelling overlying the increased dorsal osteophytes, likely related to increased joint effusion. Findings can reflect progression of neuropathic osteoarthropathy. Stable osteoarthritis of the forefoot and tibiotalar joints. Chronic ossification in the region of the plantar fascia consistent with prior inflammation.  - Podiatry consult appreciated - WBAT with walker  - Right food MRI done: Complete replacement of bony marrow with resultant edema within the   proximal shaft of the second metatarsal, central cuneiform and to lesser   extent the medial lateral cuneiforms. This is seen to lesser extent   within the central lateral navicular bone. Findings could reflect acute   osteomyelitis. Although there are no definite soft tissue ulcerations,   osteomyelitis may be present. Consider correlation with a nuclear   medicine white blood cell study.    Extensive osteoarthritic changes, which could reflect progression of   neuropathic osteoarthropathy. Diffuse periosteal thickening the second   metatarsal shaft which may reflect a chronic process.    Extensive juxta articular cystic processes emanating from the joints as   described.    - Continuation of steroid and pain control   - Patient to follow up in Dr. Flmeing outpatient clinic.     # Transaminitis, mild  - AST 61, ALT 51 on admission  - no abdominal pain  - avoid hepatotoxic meds  - Outpatient follow up     # HTN  - c/w Amlodipine 5mg qd    # HLD  - c/w statin    # Hypothyroidism   - c/w Synthroid 25 mcg qd 63 year old male PMHx HTN, HLD, COPD, Hypothyroidism, presents to the ED for left neck swelling. Pt tried putting his dentures in that he has not worn for a long time. He reports pain and "nerve damage" since putting them in 1 week ago. 2 days ago he developed a sore throat and low grade fevers so he went to urgent care and was started on Azithro for strep throat. Pt reports difficulty opening his jaw and eating. He also reports worsening swelling on the left side of his neck so he came to the ED. Denies chest pain, SOB, dysphagia, N/V/D. T 99.3, HR 91, /94, RR 18, 99% on RA. WBC 12, AST 61, ALT 51, lactate 1.3. CT neck prelim: Indistinct hypodensity with local mass effect measuring approximately 2.5 x 2.1 adjacent to the left submandibular gland suspicious for infectious process without distinct drainable fluid collection. Received Decadron and Clindamycin  Admit to Medicine. Seen by fatmata and s/p infected tooth #17 removal with purulence expressed. Patient improved clinically, CT scan showing Rim-enhancing fluid collection along the inner margin of the left mandibular body measuring 1 x 2 x 2 cm (TRV, AP, CC; ser 3 im 70) consistent with abscess with extensive associated soft tissue swelling including. ENT consulted, recommended IR for drainage, scans reviewed not ammenable to IR drainage. ID was consulted, antibiotics broadened with ceftriaxone and clinda. OMFS consulted, s/p I&D with 3cc of purulent drainage expressed and sent to lab. Residual limited jaw opening likely reactive inflammation from the procedure - expected to resolve. C/w peridex BID for 1-2 weeks and soft diet advance as tolerated. Patientn will need outpatient dental follow up in 1 week. Patient with R foot pain with history of OA, podiatry consulted, xray consistent with severe OA of right forefoot. Follow up outpatient for MRI foot. WBAT w/ walker. Patient hemodynamically stable for discharge.     # Left submandibular gland abscess  - no sepsis poa  - CT neck Rim-enhancing fluid collection along the inner margin of the left mandibular body measuring 1 x 2 x 2 cm (TRV, AP, CC; ser 3 im 70) consistent with abscess. Extensive associated soft tissue swelling including: -Left  space edema and expansion.-Left submandibular gland edema. -Phlegmon within the submandibular space with deviation of the floor of mouth structures to the right. -Left oropharyngeal, supraglottic laryngeal and piriform sinus edema. The left pyriform sinus is completely effaced. -The uvula is edematous.  - s/p Decadron x3 doses  - S/p IV Clindamycin  - c/w PPI  - Blood culture NGTD  - Dental consult appreciated - s/p non-surgical extraction of #17 completed, purulent material expressed  - Dental F/U with outpatient dentist for comprehensive dental care or [facility] dental clinic   - ENT consulted, recommend IR drainage of absecess - Neuro IR consulted images not amenable for drainage  - OMFS appreciated - s/p I&D with 3cc purulent drainage sent and cultures c/w normal oral joanne  - ID consult appreciated  - Once clinically improving, anticipate D/C on PO levaquin 750 mg daily and PO flagyl 500mg TID to complete 14 days total of antibiotics until 5/14      #Right foot pain  #OA?  - Uric acid 4.3  - Foot with new swelling and redness, concerning for gout vs infection  - xray foot No acute fracture or dislocation. Severe osteoarthritis of the midfoot and talonavicular joints, increased since June 2018, with dorsal midfoot soft tissue swelling overlying the increased dorsal osteophytes, likely related to increased joint effusion. Findings can reflect progression of neuropathic osteoarthropathy. Stable osteoarthritis of the forefoot and tibiotalar joints. Chronic ossification in the region of the plantar fascia consistent with prior inflammation.  - Podiatry consult appreciated - WBAT with walker  - Right food MRI done: Complete replacement of bony marrow with resultant edema within the   proximal shaft of the second metatarsal, central cuneiform and to lesser   extent the medial lateral cuneiforms. This is seen to lesser extent   within the central lateral navicular bone. Findings could reflect acute   osteomyelitis. Although there are no definite soft tissue ulcerations,   osteomyelitis may be present. Consider correlation with a nuclear   medicine white blood cell study.    Extensive osteoarthritic changes, which could reflect progression of   neuropathic osteoarthropathy. Diffuse periosteal thickening the second   metatarsal shaft which may reflect a chronic process.    Extensive juxta articular cystic processes emanating from the joints as   described.    - Continuation of steroid and pain control   - Patient to follow up in Dr. Vu outpatient clinic.     # Transaminitis, mild  - AST 61, ALT 51 on admission  - no abdominal pain  - avoid hepatotoxic meds  - Outpatient follow up     # HTN  - c/w Amlodipine 5mg qd    # HLD  - c/w statin    # Hypothyroidism   - c/w Synthroid 25 mcg qd    attending addendum:   The patient stated that he feels much better , no foot pain, his face is much better as well and he wants to go home   MRI noted - Podiatry team aware - recommended to follow ip op with Dr. vu   ID input appreciated   ekg done , QTc wnl   patient understand the discharge plan and the antibioitics and podiatry f/u

## 2024-05-03 NOTE — PROGRESS NOTE ADULT - SUBJECTIVE AND OBJECTIVE BOX
OMFS Consult Note    5/2/2024: 63M with PMH HTN, HLD, COPD, Hypothyroidism admitted on 4/28/24 for neck swelling for one day and odynophagia. Patient was placed on steroids and IV antibiotics. On 4/29, patient had tooth #17 extracted by dental, with purulence expressed. Patient has been improving clinically since then.  Patient seen in clinic this AM. Patient subjectively feels better. Tolerating diet. Feels as if his mouth opening is improved slightly since admission, but still limited. Denies odynophagia, dysphagia and respiratory distress.     Interval hx 5/3: Patient seen at bedside this AM. Continue to improve, tolerating diet, intraoral pain controlled. Denies purulent taste in mouth since procedure. Denies odynophagia, dysphagia and respiratory distress.     Vitals Reviewed: stable    Physical exam:  Minimal submandibular edema, soft, non-tender, severely improved since yesterday  S/p incision and drainage by site #17. No purulence expressible today. Site granulating in.   ZACHARIAH ~20 mm.   FOM soft  uvula midline  no pooling of secretions  CN V3 and lingual nerve intact    Labs Reviewed  WBC: 17>10>9>8  >132>107    CT Neck (4/28):  Rim-enhancing fluid collection along the inner margin of the left mandibular body measuring 1 x 2 x 2 cm (TRV, AP, CC; ser 3 im 70) consistent with abscess. Extensive associated soft tissue swelling   including:  -Left  space edema and expansion.  -Left submandibular gland edema.  -Phlegmon within the submandibular space with deviation of the floor of   mouth structures to the right.  -Left oropharyngeal, supraglottic laryngeal and piriform sinus edema. The   left pyriform sinus is completely effaced.  -The uvula is edematous.    2. Multiple reactive appearing left 1B and 2 lymph nodes.    3. Left mandibular and maxillary dental caries and periapical lucencies.   Recommend correlation with dental exam.    4. Absent left thyroid lobe. Right thyroid lobe nodule measuring 2 cm,   recommend thyroid sonogram for further evaluation.     A/P: 63M with PMH HTN, HLD, COPD, Hypothyroidism admitted on 4/28/24 with left submandibular edema 2/2 infected tooth #17. On 4/29, patient had tooth #17 extracted by dental, with purulence expressed. Patient improving clinically. Intraoral abscess draining spontaneously, but with limited opening in gingiva. Incision and drainage of intraoral performed by OMFS on 5/2, with 3 cc of purulence drained and submitted for culture. Patient without any further purulent drainage, improved clinically. Residual limited opening likely reactive inflammation from the procedure - expected to resolve.     - no further intervention per OMFS at this time  - c/w antibiotics per ID  - soft diet. Advance as tolerated  - pain control  - peridex BID  - f/u cultures  - suction at bedside  - Patient to follow-up outpatient in dental clinic one week after discharge  - Call OMFS at [phone] with any questions or concerns    Aba Bartlett

## 2024-05-03 NOTE — PROGRESS NOTE ADULT - SUBJECTIVE AND OBJECTIVE BOX
HEATHERLIGIA  63y, Male  Allergy: penicillin (Hives)      LOS  5d    CHIEF COMPLAINT: neck pain (29 Apr 2024 12:07)      INTERVAL EVENTS/HPI  - No acute events overnight s/p I&D by OMFS  - T(F): , Max: 98.8 (05-03-24 @ 00:16)  - Denies any worsening symptoms  - Tolerating medication  - WBC Count: 8.51 (05-03-24 @ 06:34)  WBC Count: 9.20 (05-02-24 @ 07:17)     - Creatinine: 0.9 (05-03-24 @ 06:34)  Creatinine: 0.7 (05-02-24 @ 07:17)       ROS  General: Denies rigors, nightsweats  HEENT: Denies headache, rhinorrhea, sore throat, eye pain  CV: Denies CP, palpitations  PULM: Denies wheezing, hemoptysis  GI: Denies hematemesis, hematochezia, melena  : Denies discharge, hematuria  MSK: Denies arthralgias, myalgias  SKIN: Denies rash, lesions  NEURO: Denies paresthesias, weakness  PSYCH: Denies depression, anxiety    VITALS:  T(F): 98.1, Max: 98.8 (05-03-24 @ 00:16)  HR: 63  BP: 156/94  RR: 20Vital Signs Last 24 Hrs  T(C): 36.7 (03 May 2024 08:14), Max: 37.1 (03 May 2024 00:16)  T(F): 98.1 (03 May 2024 08:14), Max: 98.8 (03 May 2024 00:16)  HR: 63 (03 May 2024 08:14) (55 - 63)  BP: 156/94 (03 May 2024 08:14) (119/69 - 156/94)  BP(mean): 98 (03 May 2024 05:00) (98 - 98)  RR: 20 (03 May 2024 08:14) (18 - 20)  SpO2: 97% (03 May 2024 08:14) (94% - 97%)    Parameters below as of 03 May 2024 08:14  Patient On (Oxygen Delivery Method): room air        PHYSICAL EXAM:  Gen: NAD, resting in bed  HEENT: Normocephalic, atraumatic  Neck: supple, no lymphadenopathy decreased erythema/edema   CV: Regular rate & regular rhythm  Lungs: decreased BS at bases, no fremitus  Abdomen: Soft, BS present  Ext: Warm, well perfused  Neuro: non focal, awake  Skin: no rash, no erythema  Lines: no phlebitis    FH: Non-contributory  Social Hx: Non-contributory    TESTS & MEASUREMENTS:                        15.0   8.51  )-----------( 232      ( 03 May 2024 06:34 )             44.5     05-03    141  |  104  |  17  ----------------------------<  107<H>  5.8<H>   |  24  |  0.9    Ca    9.1      03 May 2024 06:34  Mg     2.4     05-03    TPro  6.0  /  Alb  3.4<L>  /  TBili  0.3  /  DBili  x   /  AST  37  /  ALT  60<H>  /  AlkPhos  72  05-03      LIVER FUNCTIONS - ( 03 May 2024 06:34 )  Alb: 3.4 g/dL / Pro: 6.0 g/dL / ALK PHOS: 72 U/L / ALT: 60 U/L / AST: 37 U/L / GGT: x           Urinalysis Basic - ( 03 May 2024 06:34 )    Color: x / Appearance: x / SG: x / pH: x  Gluc: 107 mg/dL / Ketone: x  / Bili: x / Urobili: x   Blood: x / Protein: x / Nitrite: x   Leuk Esterase: x / RBC: x / WBC x   Sq Epi: x / Non Sq Epi: x / Bacteria: x        Culture - Abscess with Gram Stain (collected 05-02-24 @ 16:03)  Source: .Abscess Oral cavity  Gram Stain (05-03-24 @ 02:21):    Rare polymorphonuclear leukocytes seen per low power field    Rare Gram positive cocci in pairs seen per oil power field    Rare Yeast like cells seen per oil power field    Few Gram Negative Rods seen per oil power field    Culture - Blood (collected 04-29-24 @ 06:35)  Source: .Blood None  Preliminary Report (05-03-24 @ 14:05):    No growth at 4 days        Lactate, Blood: 1.3 mmol/L (04-28-24 @ 15:27)      INFECTIOUS DISEASES TESTING      INFLAMMATORY MARKERS  C-Reactive Protein: 107.9 mg/L (05-03-24 @ 06:34)  Sedimentation Rate, Erythrocyte: 88 mm/Hr (05-02-24 @ 07:17)  C-Reactive Protein: 132.7 mg/L (05-02-24 @ 07:17)  Sedimentation Rate, Erythrocyte: 73 mm/Hr (04-29-24 @ 06:35)      RADIOLOGY & ADDITIONAL TESTS:  I have personally reviewed the last available Chest xray  CXR      CT      CARDIOLOGY TESTING      MEDICATIONS  amLODIPine   Tablet 5 Oral daily  aspirin  chewable 81 Oral daily  atorvastatin 40 Oral at bedtime  cefTRIAXone   IVPB 2000 IV Intermittent every 24 hours  cefTRIAXone   IVPB     chlorhexidine 0.12% Liquid 15 Swish and Spit two times a day  clindamycin IVPB 900 IV Intermittent every 8 hours  enoxaparin Injectable 40 SubCutaneous every 24 hours  gabapentin 100 Oral three times a day  ibuprofen  Tablet. 400 Oral every 8 hours  levothyroxine 25 Oral daily  lidocaine   4% Patch 1 Transdermal every 24 hours  melatonin 5 Oral at bedtime  pantoprazole    Tablet 40 Oral before breakfast  sodium zirconium cyclosilicate 5 Oral once      WEIGHT  Weight (kg): 90.7 (04-28-24 @ 13:58)  Creatinine: 0.9 mg/dL (05-03-24 @ 06:34)      ANTIBIOTICS:  cefTRIAXone   IVPB      cefTRIAXone   IVPB 2000 milliGRAM(s) IV Intermittent every 24 hours  clindamycin IVPB 900 milliGRAM(s) IV Intermittent every 8 hours      All available historical records have been reviewed

## 2024-05-03 NOTE — PROGRESS NOTE ADULT - SUBJECTIVE AND OBJECTIVE BOX
T H I S   I S    N O  T   A    F I N A L I Z E D   N O T ADA ROMERO, LIGIA  63y, Male  Allergy: penicillin (Hives)    Hospital Day: 5d    Patient seen and examined earlier today.     PMH/PSH:  PAST MEDICAL & SURGICAL HISTORY:  HTN (hypertension)      HLD (hyperlipidemia)      Goiter      Emphysema/COPD  "mild" per pt      OA (osteoarthritis)      Back pain      Anxiety      Gallstones      COPD, mild      Hypothyroidism      H/O bilateral hip replacements      S/P inguinal hernia repair          LAST 24-Hr EVENTS:    VITALS:  T(F): 98.1 (05-03-24 @ 08:14), Max: 98.8 (05-02-24 @ 15:00)  HR: 63 (05-03-24 @ 08:14)  BP: 156/94 (05-03-24 @ 08:14) (119/69 - 156/94)  RR: 20 (05-03-24 @ 08:14)  SpO2: 97% (05-03-24 @ 08:14)          TESTS & MEASUREMENTS:  Weight/BMI  90.7 (04-28-24 @ 13:58)    05-01-24 @ 07:01  -  05-02-24 @ 07:00  --------------------------------------------------------  IN: 0 mL / OUT: 700 mL / NET: -700 mL                            15.0   8.51  )-----------( 232      ( 03 May 2024 06:34 )             44.5         05-03    141  |  104  |  17  ----------------------------<  107<H>  5.8<H>   |  24  |  0.9    Ca    9.1      03 May 2024 06:34  Mg     2.4     05-03    TPro  6.0  /  Alb  3.4<L>  /  TBili  0.3  /  DBili  x   /  AST  37  /  ALT  60<H>  /  AlkPhos  72  05-03    LIVER FUNCTIONS - ( 03 May 2024 06:34 )  Alb: 3.4 g/dL / Pro: 6.0 g/dL / ALK PHOS: 72 U/L / ALT: 60 U/L / AST: 37 U/L / GGT: x                 Culture - Abscess with Gram Stain (collected 05-02-24 @ 16:03)  Source: .Abscess Oral cavity  Gram Stain (05-03-24 @ 02:21):    Rare polymorphonuclear leukocytes seen per low power field    Rare Gram positive cocci in pairs seen per oil power field    Rare Yeast like cells seen per oil power field    Few Gram Negative Rods seen per oil power field    Culture - Blood (collected 04-29-24 @ 06:35)  Source: .Blood None  Preliminary Report (05-02-24 @ 14:01):    No growth at 72 Hours      Urinalysis Basic - ( 03 May 2024 06:34 )    Color: x / Appearance: x / SG: x / pH: x  Gluc: 107 mg/dL / Ketone: x  / Bili: x / Urobili: x   Blood: x / Protein: x / Nitrite: x   Leuk Esterase: x / RBC: x / WBC x   Sq Epi: x / Non Sq Epi: x / Bacteria: x                            RADIOLOGY, ECG, & ADDITIONAL TESTS:      RECENT DIAGNOSTIC ORDERS:      MEDICATIONS:  MEDICATIONS  (STANDING):  amLODIPine   Tablet 5 milliGRAM(s) Oral daily  aspirin  chewable 81 milliGRAM(s) Oral daily  atorvastatin 40 milliGRAM(s) Oral at bedtime  cefTRIAXone   IVPB      cefTRIAXone   IVPB 2000 milliGRAM(s) IV Intermittent every 24 hours  chlorhexidine 0.12% Liquid 15 milliLiter(s) Swish and Spit two times a day  clindamycin IVPB 900 milliGRAM(s) IV Intermittent every 8 hours  enoxaparin Injectable 40 milliGRAM(s) SubCutaneous every 24 hours  gabapentin 100 milliGRAM(s) Oral three times a day  ibuprofen  Tablet. 400 milliGRAM(s) Oral every 8 hours  levothyroxine 25 MICROGram(s) Oral daily  lidocaine   4% Patch 1 Patch Transdermal every 24 hours  melatonin 5 milliGRAM(s) Oral at bedtime  pantoprazole    Tablet 40 milliGRAM(s) Oral before breakfast    MEDICATIONS  (PRN):  acetaminophen     Tablet .. 650 milliGRAM(s) Oral every 6 hours PRN Temp greater or equal to 38C (100.4F), Mild Pain (1 - 3)  methocarbamol 750 milliGRAM(s) Oral every 8 hours PRN Muscle Spasm      HOME MEDICATIONS:  amLODIPine 5 mg oral tablet (04-28)  aspirin 81 mg oral tablet (02-23)  cyclobenzaprine 10 mg oral tablet (04-28)  gabapentin 100 mg oral tablet (02-23)  levothyroxine 25 mcg (0.025 mg) oral tablet (02-23)  methocarbamol 750 mg oral tablet (02-23)  rosuvastatin 10 mg oral tablet (02-23)      PHYSICAL EXAM:  GENERAL:   CHEST/LUNG:   HEART:   ABDOMEN:   EXTREMITIES:           LIGIA ROMERO  63y, Male  Allergy: penicillin (Hives)    Hospital Day: 5d    Patient seen and examined earlier today.  he stated that he still haivng the left foot pain but improved , morpphine given     PMH/PSH:  PAST MEDICAL & SURGICAL HISTORY:  HTN (hypertension)      HLD (hyperlipidemia)      Goiter      Emphysema/COPD  "mild" per pt      OA (osteoarthritis)      Back pain      Anxiety      Gallstones      COPD, mild      Hypothyroidism      H/O bilateral hip replacements      S/P inguinal hernia repair          LAST 24-Hr EVENTS:    VITALS:  T(F): 98.1 (05-03-24 @ 08:14), Max: 98.8 (05-02-24 @ 15:00)  HR: 63 (05-03-24 @ 08:14)  BP: 156/94 (05-03-24 @ 08:14) (119/69 - 156/94)  RR: 20 (05-03-24 @ 08:14)  SpO2: 97% (05-03-24 @ 08:14)          TESTS & MEASUREMENTS:  Weight/BMI  90.7 (04-28-24 @ 13:58)    05-01-24 @ 07:01  -  05-02-24 @ 07:00  --------------------------------------------------------  IN: 0 mL / OUT: 700 mL / NET: -700 mL                            15.0   8.51  )-----------( 232      ( 03 May 2024 06:34 )             44.5         05-03    141  |  104  |  17  ----------------------------<  107<H>  5.8<H>   |  24  |  0.9    Ca    9.1      03 May 2024 06:34  Mg     2.4     05-03    TPro  6.0  /  Alb  3.4<L>  /  TBili  0.3  /  DBili  x   /  AST  37  /  ALT  60<H>  /  AlkPhos  72  05-03    LIVER FUNCTIONS - ( 03 May 2024 06:34 )  Alb: 3.4 g/dL / Pro: 6.0 g/dL / ALK PHOS: 72 U/L / ALT: 60 U/L / AST: 37 U/L / GGT: x                 Culture - Abscess with Gram Stain (collected 05-02-24 @ 16:03)  Source: .Abscess Oral cavity  Gram Stain (05-03-24 @ 02:21):    Rare polymorphonuclear leukocytes seen per low power field    Rare Gram positive cocci in pairs seen per oil power field    Rare Yeast like cells seen per oil power field    Few Gram Negative Rods seen per oil power field    Culture - Blood (collected 04-29-24 @ 06:35)  Source: .Blood None  Preliminary Report (05-02-24 @ 14:01):    No growth at 72 Hours      Urinalysis Basic - ( 03 May 2024 06:34 )    Color: x / Appearance: x / SG: x / pH: x  Gluc: 107 mg/dL / Ketone: x  / Bili: x / Urobili: x   Blood: x / Protein: x / Nitrite: x   Leuk Esterase: x / RBC: x / WBC x   Sq Epi: x / Non Sq Epi: x / Bacteria: x                            RADIOLOGY, ECG, & ADDITIONAL TESTS:      RECENT DIAGNOSTIC ORDERS:      MEDICATIONS:  MEDICATIONS  (STANDING):  amLODIPine   Tablet 5 milliGRAM(s) Oral daily  aspirin  chewable 81 milliGRAM(s) Oral daily  atorvastatin 40 milliGRAM(s) Oral at bedtime  cefTRIAXone   IVPB      cefTRIAXone   IVPB 2000 milliGRAM(s) IV Intermittent every 24 hours  chlorhexidine 0.12% Liquid 15 milliLiter(s) Swish and Spit two times a day  clindamycin IVPB 900 milliGRAM(s) IV Intermittent every 8 hours  enoxaparin Injectable 40 milliGRAM(s) SubCutaneous every 24 hours  gabapentin 100 milliGRAM(s) Oral three times a day  ibuprofen  Tablet. 400 milliGRAM(s) Oral every 8 hours  levothyroxine 25 MICROGram(s) Oral daily  lidocaine   4% Patch 1 Patch Transdermal every 24 hours  melatonin 5 milliGRAM(s) Oral at bedtime  pantoprazole    Tablet 40 milliGRAM(s) Oral before breakfast    MEDICATIONS  (PRN):  acetaminophen     Tablet .. 650 milliGRAM(s) Oral every 6 hours PRN Temp greater or equal to 38C (100.4F), Mild Pain (1 - 3)  methocarbamol 750 milliGRAM(s) Oral every 8 hours PRN Muscle Spasm      HOME MEDICATIONS:  amLODIPine 5 mg oral tablet (04-28)  aspirin 81 mg oral tablet (02-23)  cyclobenzaprine 10 mg oral tablet (04-28)  gabapentin 100 mg oral tablet (02-23)  levothyroxine 25 mcg (0.025 mg) oral tablet (02-23)  methocarbamol 750 mg oral tablet (02-23)  rosuvastatin 10 mg oral tablet (02-23)      PHYSICAL EXAM:  On exam  General: awake, alert, NAD, improved left lower cheek swilling and sumandibular area, non tender   Lungs:  clear to ausculations b/l, normal resp effort  Heart: regular ryhthm   Abdomen: soft, non tender non distended  Ext: no edema, can move all  his extremities , R feet dorsal aspect mid area improving mild tender but some redness and mild localozed edema noted

## 2024-05-03 NOTE — PROGRESS NOTE ADULT - ASSESSMENT
64 y/o man with PMH of HTN, hyperlipidemia, COPD, and hypothyroidism presented to the ED for left neck swelling and difficulty opening his jaw and eating.    []left submandibular edema/ Dental infection #17 with deep neck infection s/p extraction  #Leukocytosis improving   CT neck reported Rim-enhancing fluid collection along the inner margin of the left  mandibular body measuring 1 x 2 x 2 cm (TRV, AP, CC; ser 3 im 70)   consistent with abscess.   Extensive associated soft tissue swelling  including:  -Left  space edema and expansion.  -Left submandibular gland edema. -Phlegmon within the submandibular space with deviation of the floor of   mouth structures to the right. -Left oropharyngeal, supraglottic laryngeal and piriform sinus edema. The   left pyriform sinus is completely effaced. -The uvula is edematous.   2. Multiple reactive appearing left 1B and 2 lymph nodes.   3. Left mandibular and maxillary dental caries and periapical lucencies. Recommend correlation with dental exam.   4. Absent left thyroid lobe. Right thyroid lobe nodule measuring 2 cm, recommend thyroid sonogram for further evaluation.    - s/p extraction of tooth #17 with expression of pus on 4/29  - ENT following - requested IR eval of abscess drainage- as per IR  collection is not amenable for drainage.  get [facility] surgery consult appreciated s/p Incision and drainage of intraoral performed by OMFS on 5/2, with 3 cc of purulence drained and submitted for culture.  - continue with Clinda IV AND ceftriaxone for now   - dental follow up  - tolerating diet   f/u cultures including new culture from today   ESR 73 ->88  crp 174.6 --->107.9  f/u  Strep cx throat collected    - peridex swish and spit q12  - pain control with motrin/tylenol   ID f/u appreciated     #Mild Transaminitis   - monitor and outpatient follow up if stable     # HTN/DL  - continue home meds    #hypothyroid  - on synthroid   op ENDOcrine eval for thyroid nodule and sono     #COPD   - not in exacerbation     []chronic feet arthrtitic pain which he uses voltaren gel and local steroid injection by podiatry as op    lidocaine patch   , uric acid 4.3   ONE dose of morphine   c/w ibuprofen for now   Foot x ray - Severe osteoarthritis of the midfoot and talonavicular joints, increased since June 2018, with dorsal midfoot   soft tissue swelling overlying the increased dorsal osteophytes, likely related to increased joint effusion. Findings can reflect progression of   neuropathic osteoarthropathy.  Stable osteoarthritis of the forefoot and tibiotalar joints. Chronic ossification in the region of the plantar fascia consistent with prior   inflammation.  rolling walker papers done  PT/OT     Podiatry input appreciated - Recommend Right Foot MRI but if this is going to delay patient's discharge, MRI can be done outpatient.       dvt PPx and GI ppx     Pending  symptoms labs,, cultures , podiatry, MRI

## 2024-05-03 NOTE — CONSULT NOTE ADULT - CONSULT REASON
Dental caries and neck swelling
Facial swelling
Right Foot Pain and Swelling
prelim CT read w/ indistinct hypodensity adjacent to L SMG
neck infection

## 2024-05-03 NOTE — DISCHARGE NOTE PROVIDER - NSDCMRMEDTOKEN_GEN_ALL_CORE_FT
acetaminophen 325 mg oral tablet: 2 tab(s) orally every 6 hours As needed Temp greater or equal to 38C (100.4F), Mild Pain (1 - 3)  amLODIPine 5 mg oral tablet: 1 tab(s) orally once a day  aspirin 81 mg oral tablet, chewable: 1 tab(s) orally once a day  cyclobenzaprine 10 mg oral tablet: 1 tab(s) orally every 8 hours as needed for  muscle spasm  gabapentin 100 mg oral capsule: 1 cap(s) orally 3 times a day  ibuprofen 400 mg oral tablet: 1 tab(s) orally every 8 hours  levothyroxine 25 mcg (0.025 mg) oral tablet: 1 tab(s) orally once a day  methocarbamol 750 mg oral tablet: 1 tab(s) orally every 8 hours As needed Muscle Spasm  rosuvastatin 10 mg oral tablet: 1 tab(s) orally once a day (at bedtime)   acetaminophen 325 mg oral tablet: 2 tab(s) orally every 6 hours As needed Temp greater or equal to 38C (100.4F), Mild Pain (1 - 3)  amLODIPine 5 mg oral tablet: 1 tab(s) orally once a day  aspirin 81 mg oral tablet, chewable: 1 tab(s) orally once a day  cyclobenzaprine 10 mg oral tablet: 1 tab(s) orally every 8 hours as needed for  muscle spasm  gabapentin 100 mg oral capsule: 1 cap(s) orally 3 times a day   mg oral tablet: 1 tab(s) orally 2 times a day for 3 days  levothyroxine 25 mcg (0.025 mg) oral tablet: 1 tab(s) orally once a day  methocarbamol 750 mg oral tablet: 1 tab(s) orally every 8 hours As needed Muscle Spasm  rosuvastatin 10 mg oral tablet: 1 tab(s) orally once a day (at bedtime)   acetaminophen 325 mg oral tablet: 2 tab(s) orally every 6 hours As needed Temp greater or equal to 38C (100.4F), Mild Pain (1 - 3)  amLODIPine 5 mg oral tablet: 1 tab(s) orally once a day  aspirin 81 mg oral tablet, chewable: 1 tab(s) orally once a day  cyclobenzaprine 10 mg oral tablet: 1 tab(s) orally every 8 hours as needed for  muscle spasm  gabapentin 100 mg oral capsule: 1 cap(s) orally 3 times a day   mg oral tablet: 1 tab(s) orally 2 times a day for 3 days  levoFLOXacin 750 mg oral tablet: 1 tab(s) orally once a day Take until 5/14/2024  levothyroxine 25 mcg (0.025 mg) oral tablet: 1 tab(s) orally once a day  methocarbamol 750 mg oral tablet: 1 tab(s) orally every 8 hours As needed Muscle Spasm  metroNIDAZOLE 500 mg oral tablet: 1 tab(s) orally 3 times a day Take until 5/14/2024  rosuvastatin 10 mg oral tablet: 1 tab(s) orally once a day (at bedtime)

## 2024-05-03 NOTE — DISCHARGE NOTE PROVIDER - NSDCCPCAREPLAN_GEN_ALL_CORE_FT
PRINCIPAL DISCHARGE DIAGNOSIS  Diagnosis: Cellulitis of neck  Assessment and Plan of Treatment: An abscess is an infected area that contains a collection of pus and debris. It can occur in almost any part of the body and occurs when the tissue gets infection. Symptoms include a painful mass that is red, warm, tender that might break open and HAVE drainage. You had a tooth removed and then an incision made to drain it. You were started on antibiotics. Please continue to take even if you are feeling better. You can have soft foods and can advance it as tolerated. You were complaining of foot pain and an xray was done showing severe OA. Please follow up with your podiatrist for MRI and steroid.   SEEK IMMEDIATE MEDICAL CARE IF YOU HAVE ANY OF THE FOLLOWING SYMPTOMS: swelling in your neck, worsening pain with swallowing, chills, fever, muscle aches, or red streaking from the area.       PRINCIPAL DISCHARGE DIAGNOSIS  Diagnosis: Cellulitis of neck  Assessment and Plan of Treatment: An abscess is an infected area that contains a collection of pus and debris. It can occur in almost any part of the body and occurs when the tissue gets infection. Symptoms include a painful mass that is red, warm, tender that might break open and HAVE drainage. You had a tooth removed and then an incision made to drain it. You were started on antibiotics until 5/14. Please continue to take even if you are feeling better. You can have soft foods and can advance it as tolerated. You were complaining of foot pain and an xray was done showing severe OA. Please follow up with your podiatrist for MRI and steroid.   SEEK IMMEDIATE MEDICAL CARE IF YOU HAVE ANY OF THE FOLLOWING SYMPTOMS: swelling in your neck, worsening pain with swallowing, chills, fever, muscle aches, or red streaking from the area.       PRINCIPAL DISCHARGE DIAGNOSIS  Diagnosis: Cellulitis of neck  Assessment and Plan of Treatment: An abscess is an infected area that contains a collection of pus and debris. It can occur in almost any part of the body and occurs when the tissue gets infection. Symptoms include a painful mass that is red, warm, tender that might break open and HAVE drainage. You had a tooth removed and then an incision made to drain it. You were started on antibiotics until 5/14. Please continue to take even if you are feeling better. You can have soft foods and can advance it as tolerated. You were complaining of foot pain and an xray was done showing severe OA.   SEEK IMMEDIATE MEDICAL CARE IF YOU HAVE ANY OF THE FOLLOWING SYMPTOMS: swelling in your neck, worsening pain with swallowing, chills, fever, muscle aches, or red streaking from the area.  MRI right foot: Complete replacement of bony marrow with resultant edema within the proximal shaft of the second metatarsal, central cuneiform and to lesser extent the medial lateral cuneiforms. This is seen to lesser extent within the central lateral navicular bone. Findings could reflect acute osteomyelitis. Although there are no definite soft tissue ulcerations, osteomyelitis may be present. Consider correlation with a nuclear medicine white blood cell study.  Extensive osteoarthritic changes, which could reflect progression of   neuropathic osteoarthropathy. Diffuse periosteal thickening the second metatarsal shaft which may reflect a chronic process.  Extensive juxta articular cystic processes emanating from the joints as described.  Please follow up with Dr. Fleming, the podiatrist, as outpatient.

## 2024-05-03 NOTE — CONSULT NOTE ADULT - SUBJECTIVE AND OBJECTIVE BOX
LIGIA ROMERO  63y, Male  Allergy: penicillin (Hives)      CHIEF COMPLAINT:   neck pain (29 Apr 2024 12:07)      LOS  2d    HPI  HPI:  63 year old male PMHx HTN, HLD, COPD, Hypothyroidism, presents to the ED for left neck swelling. Pt tried putting his dentures in that he has not worn for a long time. He reports pain and "nerve damage" since putting them in 1 week ago. 2 days ago he developed a sore throat and low grade fevers so he went to urgent care and was started on Azithro for strep throat. Pt reports difficulty opening his jaw and eating. He also reports worsening swelling on the left side of his neck so he came to the ED. Denies chest pain, SOB, dysphagia, N/V/D.    T 99.3, HR 91, /94, RR 18, 99% on RA  WBC 12, AST 61, ALT 51, lactate 1.3  CT neck prelim: Indistinct hypodensity with local mass effect measuring approximately 2.5 x 2.1 adjacent to the left submandibular gland suspicious for infectious process without distinct drainable fluid collection.  Received Decadron and Clindamycin  Admit to Medicine    (28 Apr 2024 21:03)      INFECTIOUS DISEASE HISTORY:  ID consulted for deep neck infection  Reports about 10 days ago had throat pain  Went to  on Sat was diagnosed with Strep, given azithro, took 2 days then came to the ER  s/p dental extraction  CT concerning for deep neck infection     ENT ER exam-  +++trismus, unable to view posterior oropharynx. Very limited view of sublingual space soft, nonerythematous and without purulent drainage from jackie's duct at baseline or with milking of the L SMG; no palpable stones.  NECK: Trachea midline. Neck supple with full ROM. L submandibular edema w/ palpable SMG - soft, without overlying erythema, tender to palpation. No palpable collection.      Currently ordered for:  clindamycin IVPB 600 milliGRAM(s) IV Intermittent every 6 hours      PMH  PAST MEDICAL & SURGICAL HISTORY:  HTN (hypertension)      HLD (hyperlipidemia)      Goiter      Emphysema/COPD  "mild" per pt      OA (osteoarthritis)      Back pain      Anxiety      Gallstones      COPD, mild      Hypothyroidism      H/O bilateral hip replacements      S/P inguinal hernia repair          FAMILY HISTORY  non-contributory     SOCIAL HISTORY  Social History:  no ivdu      ROS  General: Denies rigors, nightsweats  HEENT: as noted above   CV: Denies CP, palpitations  PULM: Denies wheezing, hemoptysis  GI: Denies hematemesis, hematochezia, melena  : Denies discharge, hematuria  MSK: Denies arthralgias, myalgias  SKIN: Denies rash, lesions  NEURO: Denies paresthesias, weakness  PSYCH: Denies depression, anxiety     VITALS:  T(F): 97.5, Max: 98.3 (04-29-24 @ 22:19)  HR: 61  BP: 102/62  RR: 16Vital Signs Last 24 Hrs  T(C): 36.4 (30 Apr 2024 15:32), Max: 36.8 (29 Apr 2024 22:19)  T(F): 97.5 (30 Apr 2024 15:32), Max: 98.3 (29 Apr 2024 22:19)  HR: 61 (30 Apr 2024 15:32) (61 - 68)  BP: 102/62 (30 Apr 2024 15:32) (100/56 - 121/73)  BP(mean): --  RR: 16 (30 Apr 2024 15:32) (16 - 18)  SpO2: 97% (29 Apr 2024 22:19) (97% - 97%)    Parameters below as of 29 Apr 2024 22:19  Patient On (Oxygen Delivery Method): room air        PHYSICAL EXAM:  Gen: NAD, resting in bed  HEENT: Normocephalic, atraumatic  Neck: L neck edema, erythema   CV: Regular rate & regular rhythm  Lungs: decreased BS at bases, no fremitus  Abdomen: Soft, BS present  Ext: Warm, well perfused  Neuro: non focal, awake  Skin: no rash, no erythema  Lines: no phlebitis     TESTS & MEASUREMENTS:                        14.6   17.55 )-----------( 201      ( 30 Apr 2024 07:30 )             42.1     04-30    141  |  103  |  34<H>  ----------------------------<  109<H>  4.3   |  25  |  1.0    Ca    8.7      30 Apr 2024 07:30  Mg     2.8     04-30    TPro  5.6<L>  /  Alb  3.3<L>  /  TBili  0.4  /  DBili  x   /  AST  48<H>  /  ALT  67<H>  /  AlkPhos  77  04-30      LIVER FUNCTIONS - ( 30 Apr 2024 07:30 )  Alb: 3.3 g/dL / Pro: 5.6 g/dL / ALK PHOS: 77 U/L / ALT: 67 U/L / AST: 48 U/L / GGT: x           Urinalysis Basic - ( 30 Apr 2024 07:30 )    Color: x / Appearance: x / SG: x / pH: x  Gluc: 109 mg/dL / Ketone: x  / Bili: x / Urobili: x   Blood: x / Protein: x / Nitrite: x   Leuk Esterase: x / RBC: x / WBC x   Sq Epi: x / Non Sq Epi: x / Bacteria: x        Culture - Blood (collected 04-29-24 @ 06:35)  Source: .Blood None  Preliminary Report (04-30-24 @ 14:02):    No growth at 24 hours        Lactate, Blood: 1.3 mmol/L (04-28-24 @ 15:27)      INFECTIOUS DISEASES TESTING      INFLAMMATORY MARKERS  Sedimentation Rate, Erythrocyte: 73 mm/Hr (04-29-24 @ 06:35)      RADIOLOGY & ADDITIONAL TESTS:  I have personally reviewed the last Chest xray  CXR      CT      CARDIOLOGY TESTING      MEDICATIONS  amLODIPine   Tablet 5 Oral daily  aspirin  chewable 81 Oral daily  atorvastatin 40 Oral at bedtime  chlorhexidine 0.12% Liquid 15 Swish and Spit two times a day  clindamycin IVPB 600 IV Intermittent every 6 hours  enoxaparin Injectable 40 SubCutaneous every 24 hours  gabapentin 100 Oral three times a day  levothyroxine 25 Oral daily  pantoprazole    Tablet 40 Oral before breakfast        ANTIBIOTICS:  clindamycin IVPB 600 milliGRAM(s) IV Intermittent every 6 hours      ALLERGIES:  penicillin (Hives)        
Podiatry Consult Note    Subjective:  LIGIA ROMERO  is a 63 yr/o male who was seen bedside this morning with attending Dr. Fleming.   Patient follows with Dr. Fleming in outpatient clinic for steroid injections for his midfoot arthritis.   But now the patient complains of redness, swelling and increase in pain to R foot.   No change to left foot.   The pain becomes so severe he is unable to walk.        T 99.3, HR 91, /94, RR 18, 99% on RA  WBC 12, AST 61, ALT 51, lactate 1.3  CT neck prelim: Indistinct hypodensity with local mass effect measuring approximately 2.5 x 2.1 adjacent to the left submandibular gland suspicious for infectious process without distinct drainable fluid collection.  Received Decadron and Clindamycin  Admit to Medicine    (28 Apr 2024 21:03)      Past Medical History and Surgical History  PAST MEDICAL & SURGICAL HISTORY:  HTN (hypertension)      HLD (hyperlipidemia)      Goiter      Emphysema/COPD  "mild" per pt      OA (osteoarthritis)      Back pain      Anxiety      Gallstones      COPD, mild      Hypothyroidism      H/O bilateral hip replacements      S/P inguinal hernia repair           Review of Systems:  [X] Ten point review of systems is otherwise negative except as noted     Objective:  Vital Signs Last 24 Hrs  T(C): 36.7 (03 May 2024 08:14), Max: 37.1 (02 May 2024 15:00)  T(F): 98.1 (03 May 2024 08:14), Max: 98.8 (02 May 2024 15:00)  HR: 63 (03 May 2024 08:14) (55 - 68)  BP: 156/94 (03 May 2024 08:14) (119/69 - 156/94)  BP(mean): 98 (03 May 2024 05:00) (98 - 98)  RR: 20 (03 May 2024 08:14) (18 - 20)  SpO2: 97% (03 May 2024 08:14) (94% - 97%)    Parameters below as of 03 May 2024 08:14  Patient On (Oxygen Delivery Method): room air                            15.0   8.51  )-----------( 232      ( 03 May 2024 06:34 )             44.5                 05-03    141  |  104  |  17  ----------------------------<  107<H>  5.8<H>   |  24  |  0.9    Ca    9.1      03 May 2024 06:34  Mg     2.4     05-03    TPro  6.0  /  Alb  3.4<L>  /  TBili  0.3  /  DBili  x   /  AST  37  /  ALT  60<H>  /  AlkPhos  72  05-03      Culture - Abscess with Gram Stain (collected 05-02-24 @ 16:03)  Source: .Abscess Oral cavity  Gram Stain (05-03-24 @ 02:21):    Rare polymorphonuclear leukocytes seen per low power field    Rare Gram positive cocci in pairs seen per oil power field    Rare Yeast like cells seen per oil power field    Few Gram Negative Rods seen per oil power field    Right Foot XR:   -No acute fracture or dislocation.   -Severe osteoarthritis of the midfoot and talonavicular joints, increased since June 2018, with dorsal midfoot soft tissue swelling overlying the increased dorsal osteophytes, likely related to increased joint effusion.   -Findings can reflect progression of neuropathic osteoarthropathy.  -Stable osteoarthritis of the forefoot and tibiotalar joints.   -Chronic ossification in the region of the plantar fascia consistent with prior inflammation.      Physical Exam - Lower Extremity Focused:   Derm:   -Increase in temperature compared to contralateral limb. Left foot cool to touch   -Diffuse swelling throughout the right foot and lower leg.   -Mild erythema to Right foot.   Vascular: DP and PT Pulses Diminished; Foot is Warm to Warm to the touch; Capillary Refill Time < 3 Seconds;    Neuro: Protective Sensation Intact  MSK: Pain On Palpation to Right Foot. Dorsal kathia prominence noted at dorsal midfoot.     Assessment:  Right Foot Pain - Arthritis vs. Neuropathic Arthropathy vs. Gout    Plan:  Chart reviewed and Patient evaluated. All Questions and Concerns Addressed and Answered  XR Imaging R Foot; Reviewed and Stated Above.   Weight Bearing Status; WBAT w/ walker  Reviewed Uric Acid level: 4.3  Glucose on admission: 146; ESR 88; .9  Recommend Right Foot MRI but if this is going to delay patient's discharge, MRI can be done outpatient.   Continuation of steroid and pain control   Patient stable from podiatry standpoint, Patient to follow up in Dr. Fleming outpatient clinic.   Will continue to monitor while inpatient.   Discussed Plan w/ Dr. Fleming    Podiatry 
Pt is a 64yo Male with PMH including HTN, HLD, COPD who presents to the hospital with worsening left neck swelling. Patient states that last sunday, 4/21, he put his partial dentures back in after not having then in for a while. Reports that he began to have left lower jaw discomfort. States that by wednesday, 4/24, his left neck & underneath the left jaw began to swell. States that he began to have progressive difficulty opening his mouth and difficulty tolerating solids, as well as reports vocal changes. Also endorses low grade fevers at home. States that he went to [facility] yesterday and reportedly tested positive for strep, was started on zithromax which he took 2 doses of.     ENT consulted for prelim CT read w/ indistinct hypodensity adjacent to L SMG.    PAST MEDICAL & SURGICAL HISTORY:  HTN (hypertension)  HLD (hyperlipidemia)  Goiter  Emphysema/COPD "mild" per pt  OA (osteoarthritis)  Back pain  Anxiety  Gallstones  COPD, mild  Hypothyroidism  H/O bilateral hip replacements  S/P inguinal hernia repair    MEDICATIONS  (STANDING):  clindamycin IVPB 900 milliGRAM(s) IV Intermittent once    Allergies  penicillin (Hives)    REVIEW OF SYSTEMS   [x] A ten-point review of systems was otherwise negative except as noted.    Vital Signs Last 24 Hrs  T(C): 37.7 (28 Apr 2024 17:25), Max: 37.7 (28 Apr 2024 17:25)  T(F): 99.9 (28 Apr 2024 17:25), Max: 99.9 (28 Apr 2024 17:25)  HR: 76 (28 Apr 2024 17:25) (76 - 91)  BP: 134/75 (28 Apr 2024 17:25) (134/75 - 137/94)  RR: 18 (28 Apr 2024 17:25) (18 - 18)  SpO2: 95% (28 Apr 2024 17:25) (95% - 99%)    Parameters below as of 28 Apr 2024 17:25  Patient On (Oxygen Delivery Method): room air    PHYSICAL EXAM:  GEN: NAD, awake and alert. No drooling or pooling of secretions. No stridor or stertor. Good vocal quality, slightly hoarse.   SKIN: Non diaphoretic.  HEENT: +++trismus, unable to view posterior oropharynx. Very limited view of sublingual space soft, nonerythematous and without purulent drainage from jackie's duct at baseline or with milking of the L SMG; no palpable stones.  NECK: Trachea midline. Neck supple with full ROM. L submandibular edema w/ palpable SMG - soft, without overlying erythema, tender to palpation. No palpable collection.  RESP: No dyspnea, non-labored breathing. No use of accessory muscles.   CARDIO: +S1/S2  ABDO: Soft, NT.  EXT: WELCH x 4    LABS:             16.2   11.78 )-----------( 191      ( 28 Apr 2024 15:27 )             47.4     04-28  139  |  100  |  18  ----------------------------<  146<H>  5.0   |  25  |  1.0    Ca    9.7      28 Apr 2024 15:27    TPro  6.8  /  Alb  4.1  /  TBili  0.8  /  DBili  x   /  AST  61<H>  /  ALT  51<H>  /  AlkPhos  94  04-28      RADIOLOGY & ADDITIONAL STUDIES:  < from: CT Neck Soft Tissue w/ IV Cont (04.28.24 @ 16:00) >  Findings:    Aerodigestive structures: Unremarkable. No evidence of abnormal   enhancement.    Thyroid gland: Unremarkable.    Parotid and submandibular glands:  Indistinct hypodensity with local mass   effect measuring approximately 2.5 x 2.1 adjacent to the left   submandibular gland suspicious for infectious process without distinct   drainable fluid collection.    Lymph nodes: No pathologic adenopathy by imaging size criteria.    Vascular structures: Unremarkable.    Osseous structures: No fracture, dislocation or destructive lesion.    Paranasal sinuses: Clear.  Mastoid air cells:  Clear.    Partially visualized intracranial structures: Unremarkable.  Orbits: Unremarkable.    Partially visualized lung apices: Unremarkable.    Impression:    Indistinct hypodensity with local mass effect measuring approximately 2.5   x 2.1 adjacent to the left submandibular gland suspicious for infectious   process without distinct drainable fluid collection.    ******PRELIMINARY REPORT******    < end of copied text >  
OMFS Consult Note    63M with PMH HTN, HLD, COPD, Hypothyroidism admitted on 4/28/24 for neck swelling for one day and odynophagia. Patient was placed on steroids and IV antibiotics. On 4/29, patient had tooth #17 extracted by dental, with purulence expressed. Patient has been improving clinically since then.    Patient seen in clinic this AM. Patient subjectively feels better. Tolerating diet. Feels as if his mouth opening is improved slightly since admission, but still limited. Denies odynophagia, dysphagia and respiratory distress.     Vitals Reviewed: stable    Physical exam:  Left submandibular edema, soft, mildly tender, improved since last night  S/p extraction tooth #17, soft tissue overgrowing area.   Purulence expressible through extraction socket #17. from lingual and cortex.   FOM soft  uvula midline  no pooling of secretions  CN V3 and lingual nerve intact    Labs Reviewed  WBC: 17>10>9  >132    CT Neck (4/28):  Rim-enhancing fluid collection along the inner margin of the left mandibular body measuring 1 x 2 x 2 cm (TRV, AP, CC; ser 3 im 70) consistent with abscess. Extensive associated soft tissue swelling   including:  -Left  space edema and expansion.  -Left submandibular gland edema.  -Phlegmon within the submandibular space with deviation of the floor of   mouth structures to the right.  -Left oropharyngeal, supraglottic laryngeal and piriform sinus edema. The   left pyriform sinus is completely effaced.  -The uvula is edematous.    2. Multiple reactive appearing left 1B and 2 lymph nodes.    3. Left mandibular and maxillary dental caries and periapical lucencies.   Recommend correlation with dental exam.    4. Absent left thyroid lobe. Right thyroid lobe nodule measuring 2 cm,   recommend thyroid sonogram for further evaluation.    Procedure: RBA reviewed (including risk of lingual nerve damage) and consent obtained for incision and drainage of intraoral abscess. 8 cc of 2% lidocaine with 1:100k epi administered via local infiltration. FTMP Sulcular incision with distobuccal release created from site #17-18. Extraction socket of tooth #17 curetted. Sub-periosteal dissection on lingual and buccal alveolus performed, extending into pteryogmandibular space. 3 cc of purulence collected. Cultures sent. Area irrigated with copious saline.  Hemostasis achieved. POIG.     A/P: 63M with PMH HTN, HLD, COPD, Hypothyroidism admitted on 4/28/24 with left submandibular edema 2/2 infected tooth #17. On 4/29, patient had tooth #17 extracted by dental, with purulence expressed. Patient improving clinically. Intraoral abscess draining spontaneously, but with limited opening in gingiva. Incision and drainage of intraoral performed by OMFS on 5/2, with 3 cc of purulence drained and submitted for culture.     - c/w antibiotics per ID  - soft diet. Advance as tolerated  - pain control  - trend WBC and CRP   - peridex BID  - f/u cultures  - OMFS to irrigate drainage site daily  - suction at bedside  - Call OMFS at [phone] with any questions or concerns    Aba Bartlett

## 2024-05-03 NOTE — PROGRESS NOTE ADULT - ASSESSMENT
ASSESSMENT  63 year old male PMHx HTN, HLD, COPD, Hypothyroidism, presents to the ED for left neck swelling.     IMPRESSION  #Dental infection #17 with deep neck infection s/p extraction  s/p 5/2 I&D by OMFS  CX - oral joanne  WBC 11  #Recent strep + (per patient, not in HIE)  #Abx allergy: penicillin (Hives)    Creatinine: 1.0 (04-30-24 @ 07:30)      Weight (kg): 90.7 (04-28-24 @ 13:58)    RECOMMENDATIONS  - Strep cx throat f/u  - Ceftriaxone 2g q24h IV  - Clinda 900mg q8h IV   - Appreciate Dental/ENT consults  - - WCX likely oral joanne  - anticipate D/C on PO levaquin 750 mg daily and PO flagyl 500mg TID to complete 14 days total of antibiotics   - Please obtain EKG to ensure QTC < 500 for fluoroquinolone therapy     If any questions, please send a message or call on [employer] Teams  Please continue to update ID with any pertinent new laboratory, radiographic findings, or change in clinical status

## 2024-05-03 NOTE — DISCHARGE NOTE PROVIDER - NSFOLLOWUPCLINICS_GEN_ALL_ED_FT
[facility]  Dental  [address] [place] [id number]  Phone: [phone]  Fax:   Follow Up Time: 1 week

## 2024-05-03 NOTE — DISCHARGE NOTE PROVIDER - NSFOLLOWUPCLINICSTOKEN_GEN_ALL_ED_FT
55 year old M with PMH schizophrenia, alcohol, cocaine and cannabis abuse BIBEMS in police custody for psych evaluation. Patient is calm and cooperative. Nonverbal, only communicating in sign, but in no distress. Will send psych clearance labs, reassess.
[id number]:1 week|| ||00\01||False;

## 2024-05-03 NOTE — DISCHARGE NOTE PROVIDER - NSDCDCMDCOMP_GEN_ALL_CORE
Please contact Dr. Reyez regarding treatment options for pain control of the shingles rash.   
This document is complete and the patient is ready for discharge.

## 2024-05-03 NOTE — DISCHARGE NOTE PROVIDER - ATTENDING DISCHARGE PHYSICAL EXAMINATION:
On exam  General: awake, alert, NAD, face edema resolved   Lungs:  clear to ausculations b/l, normal resp effort  Heart: regular ryhthm   Abdomen: soft, non tender non distended  Ext: no edema, can move all  his extremities , R foot edema and reddens resolved

## 2024-05-04 LAB
ANION GAP SERPL CALC-SCNC: 12 MMOL/L — SIGNIFICANT CHANGE UP (ref 7–14)
BASOPHILS # BLD AUTO: 0.03 K/UL — SIGNIFICANT CHANGE UP (ref 0–0.2)
BASOPHILS NFR BLD AUTO: 0.3 % — SIGNIFICANT CHANGE UP (ref 0–1)
BUN SERPL-MCNC: 21 MG/DL — HIGH (ref 10–20)
CALCIUM SERPL-MCNC: 8.7 MG/DL — SIGNIFICANT CHANGE UP (ref 8.4–10.5)
CHLORIDE SERPL-SCNC: 103 MMOL/L — SIGNIFICANT CHANGE UP (ref 98–110)
CO2 SERPL-SCNC: 27 MMOL/L — SIGNIFICANT CHANGE UP (ref 17–32)
CREAT SERPL-MCNC: 0.8 MG/DL — SIGNIFICANT CHANGE UP (ref 0.7–1.5)
CULTURE RESULTS: SIGNIFICANT CHANGE UP
EGFR: 99 ML/MIN/1.73M2 — SIGNIFICANT CHANGE UP
EOSINOPHIL # BLD AUTO: 0.2 K/UL — SIGNIFICANT CHANGE UP (ref 0–0.7)
EOSINOPHIL NFR BLD AUTO: 2.3 % — SIGNIFICANT CHANGE UP (ref 0–8)
GLUCOSE SERPL-MCNC: 125 MG/DL — HIGH (ref 70–99)
HCT VFR BLD CALC: 41 % — LOW (ref 42–52)
HGB BLD-MCNC: 13.8 G/DL — LOW (ref 14–18)
IMM GRANULOCYTES NFR BLD AUTO: 0.8 % — HIGH (ref 0.1–0.3)
LYMPHOCYTES # BLD AUTO: 3.15 K/UL — SIGNIFICANT CHANGE UP (ref 1.2–3.4)
LYMPHOCYTES # BLD AUTO: 36.1 % — SIGNIFICANT CHANGE UP (ref 20.5–51.1)
MAGNESIUM SERPL-MCNC: 2.3 MG/DL — SIGNIFICANT CHANGE UP (ref 1.8–2.4)
MCHC RBC-ENTMCNC: 32.9 PG — HIGH (ref 27–31)
MCHC RBC-ENTMCNC: 33.7 G/DL — SIGNIFICANT CHANGE UP (ref 32–37)
MCV RBC AUTO: 97.6 FL — HIGH (ref 80–94)
MONOCYTES # BLD AUTO: 0.74 K/UL — HIGH (ref 0.1–0.6)
MONOCYTES NFR BLD AUTO: 8.5 % — SIGNIFICANT CHANGE UP (ref 1.7–9.3)
NEUTROPHILS # BLD AUTO: 4.54 K/UL — SIGNIFICANT CHANGE UP (ref 1.4–6.5)
NEUTROPHILS NFR BLD AUTO: 52 % — SIGNIFICANT CHANGE UP (ref 42.2–75.2)
NRBC # BLD: 0 /100 WBCS — SIGNIFICANT CHANGE UP (ref 0–0)
PLATELET # BLD AUTO: 299 K/UL — SIGNIFICANT CHANGE UP (ref 130–400)
PMV BLD: 10 FL — SIGNIFICANT CHANGE UP (ref 7.4–10.4)
POTASSIUM SERPL-MCNC: 4.9 MMOL/L — SIGNIFICANT CHANGE UP (ref 3.5–5)
POTASSIUM SERPL-SCNC: 4.9 MMOL/L — SIGNIFICANT CHANGE UP (ref 3.5–5)
RBC # BLD: 4.2 M/UL — LOW (ref 4.7–6.1)
RBC # FLD: 13.4 % — SIGNIFICANT CHANGE UP (ref 11.5–14.5)
SODIUM SERPL-SCNC: 142 MMOL/L — SIGNIFICANT CHANGE UP (ref 135–146)
SPECIMEN SOURCE: SIGNIFICANT CHANGE UP
WBC # BLD: 8.73 K/UL — SIGNIFICANT CHANGE UP (ref 4.8–10.8)
WBC # FLD AUTO: 8.73 K/UL — SIGNIFICANT CHANGE UP (ref 4.8–10.8)

## 2024-05-04 PROCEDURE — 99232 SBSQ HOSP IP/OBS MODERATE 35: CPT

## 2024-05-04 RX ORDER — CHLORHEXIDINE GLUCONATE 213 G/1000ML
15 SOLUTION TOPICAL
Qty: 475 | Refills: 0
Start: 2024-05-04

## 2024-05-04 RX ORDER — LEVOFLOXACIN 5 MG/ML
1 INJECTION, SOLUTION INTRAVENOUS
Qty: 10 | Refills: 0
Start: 2024-05-04 | End: 2024-05-13

## 2024-05-04 RX ORDER — IBUPROFEN 200 MG
1 TABLET ORAL
Qty: 0 | Refills: 0 | DISCHARGE
Start: 2024-05-04

## 2024-05-04 RX ORDER — METRONIDAZOLE 500 MG
1 TABLET ORAL
Qty: 30 | Refills: 0
Start: 2024-05-04 | End: 2024-05-13

## 2024-05-04 RX ORDER — IBUPROFEN 200 MG
600 TABLET ORAL
Refills: 0 | Status: DISCONTINUED | OUTPATIENT
Start: 2024-05-04 | End: 2024-05-06

## 2024-05-04 RX ADMIN — CEFTRIAXONE 100 MILLIGRAM(S): 500 INJECTION, POWDER, FOR SOLUTION INTRAMUSCULAR; INTRAVENOUS at 16:06

## 2024-05-04 RX ADMIN — ATORVASTATIN CALCIUM 40 MILLIGRAM(S): 80 TABLET, FILM COATED ORAL at 21:18

## 2024-05-04 RX ADMIN — CHLORHEXIDINE GLUCONATE 15 MILLILITER(S): 213 SOLUTION TOPICAL at 17:08

## 2024-05-04 RX ADMIN — Medication 81 MILLIGRAM(S): at 11:52

## 2024-05-04 RX ADMIN — Medication 100 MILLIGRAM(S): at 05:04

## 2024-05-04 RX ADMIN — GABAPENTIN 100 MILLIGRAM(S): 400 CAPSULE ORAL at 13:46

## 2024-05-04 RX ADMIN — Medication 25 MICROGRAM(S): at 05:04

## 2024-05-04 RX ADMIN — ENOXAPARIN SODIUM 40 MILLIGRAM(S): 100 INJECTION SUBCUTANEOUS at 21:17

## 2024-05-04 RX ADMIN — CHLORHEXIDINE GLUCONATE 15 MILLILITER(S): 213 SOLUTION TOPICAL at 05:09

## 2024-05-04 RX ADMIN — Medication 100 MILLIGRAM(S): at 13:45

## 2024-05-04 RX ADMIN — Medication 600 MILLIGRAM(S): at 18:09

## 2024-05-04 RX ADMIN — GABAPENTIN 100 MILLIGRAM(S): 400 CAPSULE ORAL at 21:12

## 2024-05-04 RX ADMIN — Medication 5 MILLIGRAM(S): at 21:12

## 2024-05-04 RX ADMIN — LIDOCAINE 1 PATCH: 4 CREAM TOPICAL at 13:46

## 2024-05-04 RX ADMIN — PANTOPRAZOLE SODIUM 40 MILLIGRAM(S): 20 TABLET, DELAYED RELEASE ORAL at 07:54

## 2024-05-04 RX ADMIN — METHOCARBAMOL 750 MILLIGRAM(S): 500 TABLET, FILM COATED ORAL at 21:12

## 2024-05-04 RX ADMIN — Medication 600 MILLIGRAM(S): at 17:09

## 2024-05-04 RX ADMIN — GABAPENTIN 100 MILLIGRAM(S): 400 CAPSULE ORAL at 05:05

## 2024-05-04 RX ADMIN — AMLODIPINE BESYLATE 5 MILLIGRAM(S): 2.5 TABLET ORAL at 05:05

## 2024-05-04 RX ADMIN — LIDOCAINE 1 PATCH: 4 CREAM TOPICAL at 18:24

## 2024-05-04 RX ADMIN — Medication 100 MILLIGRAM(S): at 21:13

## 2024-05-04 NOTE — PROGRESS NOTE ADULT - ASSESSMENT
64 y/o man with PMH of HTN, hyperlipidemia, COPD, and hypothyroidism presented to the ED for left neck swelling and difficulty opening his jaw and eating.    []left submandibular edema/ Dental infection #17 with deep neck infection s/p extraction  #Leukocytosis improving   CT neck reported Rim-enhancing fluid collection along the inner margin of the left  mandibular body measuring 1 x 2 x 2 cm (TRV, AP, CC; ser 3 im 70)   consistent with abscess.   Extensive associated soft tissue swelling  including:  -Left  space edema and expansion.  -Left submandibular gland edema. -Phlegmon within the submandibular space with deviation of the floor of   mouth structures to the right. -Left oropharyngeal, supraglottic laryngeal and piriform sinus edema. The   left pyriform sinus is completely effaced. -The uvula is edematous.   2. Multiple reactive appearing left 1B and 2 lymph nodes.   3. Left mandibular and maxillary dental caries and periapical lucencies. Recommend correlation with dental exam.   4. Absent left thyroid lobe. Right thyroid lobe nodule measuring 2 cm, recommend thyroid sonogram for further evaluation.    - s/p extraction of tooth #17 with expression of pus on 4/29  - ENT following - requested IR eval of abscess drainage- as per IR  collection is not amenable for drainage.  get [facility] surgery consult appreciated s/p Incision and drainage of intraoral performed by OMFS on 5/2, with 3 cc of purulence drained and submitted for culture.  - continue with Clinda IV AND ceftriaxone for now   - dental follow up  - tolerating diet   f/u cultures including new culture from today   ESR 73 ->88  crp 174.6 --->107.9  f/u  Strep cx throat collected    - peridex swish and spit q12  - pain control with motrin/tylenol   ID f/u appreciated- would wait for the culture results     #Mild Transaminitis   - monitor and outpatient follow up if stable     # HTN/DL  - continue home meds    #hypothyroid  - on synthroid   op ENDOcrine eval for thyroid nodule and sono     #COPD   - not in exacerbation     []chronic feet arthrtitic pain which he uses voltaren gel and local steroid injection by podiatry as op    lidocaine patch   , uric acid 4.3   ONE dose of morphine   c/w ibuprofen for now   Foot x ray - Severe osteoarthritis of the midfoot and talonavicular joints, increased since June 2018, with dorsal midfoot   soft tissue swelling overlying the increased dorsal osteophytes, likely related to increased joint effusion. Findings can reflect progression of   neuropathic osteoarthropathy.  Stable osteoarthritis of the forefoot and tibiotalar joints. Chronic ossification in the region of the plantar fascia consistent with prior   inflammation.  rolling walker papers done  PT/OT     Podiatry input appreciated - Recommend Right Foot MRI but if this is going to delay patient's discharge, MRI can be done outpatient.   MRI ordered       dvt PPx and GI ppx     Pending  symptoms labs,, cultures , podiatry, MRI      discussed in n details with the patient

## 2024-05-04 NOTE — PROGRESS NOTE ADULT - SUBJECTIVE AND OBJECTIVE BOX
T H I S   I S    N O  T   A    F I N A L I Z E D   N O T ADA ROMERO, LIGIA  63y, Male  Allergy: penicillin (Hives)    Hospital Day: 6d    Patient seen and examined earlier today.     PMH/PSH:  PAST MEDICAL & SURGICAL HISTORY:  HTN (hypertension)      HLD (hyperlipidemia)      Goiter      Emphysema/COPD  "mild" per pt      OA (osteoarthritis)      Back pain      Anxiety      Gallstones      COPD, mild      Hypothyroidism      H/O bilateral hip replacements      S/P inguinal hernia repair          LAST 24-Hr EVENTS:    VITALS:  T(F): 97.5 (05-04-24 @ 07:20), Max: 98.5 (05-03-24 @ 16:05)  HR: 61 (05-04-24 @ 07:20)  BP: 124/77 (05-04-24 @ 07:20) (121/74 - 124/77)  RR: 18 (05-04-24 @ 07:20)  SpO2: 96% (05-04-24 @ 07:20)          TESTS & MEASUREMENTS:  Weight/BMI                            13.8   8.73  )-----------( 299      ( 04 May 2024 07:42 )             41.0         05-04    142  |  103  |  21<H>  ----------------------------<  125<H>  4.9   |  27  |  0.8    Ca    8.7      04 May 2024 07:42  Mg     2.3     05-04    TPro  6.0  /  Alb  3.4<L>  /  TBili  0.3  /  DBili  x   /  AST  37  /  ALT  60<H>  /  AlkPhos  72  05-03    LIVER FUNCTIONS - ( 03 May 2024 06:34 )  Alb: 3.4 g/dL / Pro: 6.0 g/dL / ALK PHOS: 72 U/L / ALT: 60 U/L / AST: 37 U/L / GGT: x                 Culture - Abscess with Gram Stain (collected 05-02-24 @ 16:03)  Source: .Abscess Oral cavity  Gram Stain (05-03-24 @ 02:21):    Rare polymorphonuclear leukocytes seen per low power field    Rare Gram positive cocci in pairs seen per oil power field    Rare Yeast like cells seen per oil power field    Few Gram Negative Rods seen per oil power field    Culture - Group A Streptococcus (collected 05-01-24 @ 23:12)  Source: .Throat Throat  Final Report (05-03-24 @ 16:53):    No Streptococcus pyogenes (Group A) isolated    Culture - Blood (collected 04-29-24 @ 06:35)  Source: .Blood None  Preliminary Report (05-03-24 @ 14:05):    No growth at 4 days      Urinalysis Basic - ( 04 May 2024 07:42 )    Color: x / Appearance: x / SG: x / pH: x  Gluc: 125 mg/dL / Ketone: x  / Bili: x / Urobili: x   Blood: x / Protein: x / Nitrite: x   Leuk Esterase: x / RBC: x / WBC x   Sq Epi: x / Non Sq Epi: x / Bacteria: x                            RADIOLOGY, ECG, & ADDITIONAL TESTS:      RECENT DIAGNOSTIC ORDERS:  MR Foot No Cont, Right: Routine   Indication: OA on xray r/o infection vs gout  Transport: Wheelchair (05-03-24 @ 17:26)  12 Lead ECG (05-03-24 @ 15:55)      MEDICATIONS:  MEDICATIONS  (STANDING):  amLODIPine   Tablet 5 milliGRAM(s) Oral daily  aspirin  chewable 81 milliGRAM(s) Oral daily  atorvastatin 40 milliGRAM(s) Oral at bedtime  cefTRIAXone   IVPB      cefTRIAXone   IVPB 2000 milliGRAM(s) IV Intermittent every 24 hours  chlorhexidine 0.12% Liquid 15 milliLiter(s) Swish and Spit two times a day  clindamycin IVPB 900 milliGRAM(s) IV Intermittent every 8 hours  enoxaparin Injectable 40 milliGRAM(s) SubCutaneous every 24 hours  gabapentin 100 milliGRAM(s) Oral three times a day  ibuprofen  Tablet. 600 milliGRAM(s) Oral two times a day  levothyroxine 25 MICROGram(s) Oral daily  lidocaine   4% Patch 1 Patch Transdermal every 24 hours  melatonin 5 milliGRAM(s) Oral at bedtime  pantoprazole    Tablet 40 milliGRAM(s) Oral before breakfast    MEDICATIONS  (PRN):  acetaminophen     Tablet .. 650 milliGRAM(s) Oral every 6 hours PRN Temp greater or equal to 38C (100.4F), Mild Pain (1 - 3)  methocarbamol 750 milliGRAM(s) Oral every 8 hours PRN Muscle Spasm      HOME MEDICATIONS:  acetaminophen 325 mg oral tablet (05-03)  amLODIPine 5 mg oral tablet (05-03)  aspirin 81 mg oral tablet, chewable (05-03)  cyclobenzaprine 10 mg oral tablet (04-28)  gabapentin 100 mg oral capsule (05-03)  ibuprofen 400 mg oral tablet (05-03)  levothyroxine 25 mcg (0.025 mg) oral tablet (05-03)  methocarbamol 750 mg oral tablet (05-03)  rosuvastatin 10 mg oral tablet (02-23)      PHYSICAL EXAM:  GENERAL:   CHEST/LUNG:   HEART:   ABDOMEN:   EXTREMITIES:             HEATHERLIGIA  63y, Male  Allergy: penicillin (Hives)    Hospital Day: 6d    Patient seen and examined earlier today. he stated that his face and R foot improved     PMH/PSH:  PAST MEDICAL & SURGICAL HISTORY:  HTN (hypertension)      HLD (hyperlipidemia)      Goiter      Emphysema/COPD  "mild" per pt      OA (osteoarthritis)      Back pain      Anxiety      Gallstones      COPD, mild      Hypothyroidism      H/O bilateral hip replacements      S/P inguinal hernia repair          LAST 24-Hr EVENTS:    VITALS:  T(F): 97.5 (05-04-24 @ 07:20), Max: 98.5 (05-03-24 @ 16:05)  HR: 61 (05-04-24 @ 07:20)  BP: 124/77 (05-04-24 @ 07:20) (121/74 - 124/77)  RR: 18 (05-04-24 @ 07:20)  SpO2: 96% (05-04-24 @ 07:20)          TESTS & MEASUREMENTS:  Weight/BMI                            13.8   8.73  )-----------( 299      ( 04 May 2024 07:42 )             41.0         05-04    142  |  103  |  21<H>  ----------------------------<  125<H>  4.9   |  27  |  0.8    Ca    8.7      04 May 2024 07:42  Mg     2.3     05-04    TPro  6.0  /  Alb  3.4<L>  /  TBili  0.3  /  DBili  x   /  AST  37  /  ALT  60<H>  /  AlkPhos  72  05-03    LIVER FUNCTIONS - ( 03 May 2024 06:34 )  Alb: 3.4 g/dL / Pro: 6.0 g/dL / ALK PHOS: 72 U/L / ALT: 60 U/L / AST: 37 U/L / GGT: x                 Culture - Abscess with Gram Stain (collected 05-02-24 @ 16:03)  Source: .Abscess Oral cavity  Gram Stain (05-03-24 @ 02:21):    Rare polymorphonuclear leukocytes seen per low power field    Rare Gram positive cocci in pairs seen per oil power field    Rare Yeast like cells seen per oil power field    Few Gram Negative Rods seen per oil power field    Culture - Group A Streptococcus (collected 05-01-24 @ 23:12)  Source: .Throat Throat  Final Report (05-03-24 @ 16:53):    No Streptococcus pyogenes (Group A) isolated    Culture - Blood (collected 04-29-24 @ 06:35)  Source: .Blood None  Preliminary Report (05-03-24 @ 14:05):    No growth at 4 days      Urinalysis Basic - ( 04 May 2024 07:42 )    Color: x / Appearance: x / SG: x / pH: x  Gluc: 125 mg/dL / Ketone: x  / Bili: x / Urobili: x   Blood: x / Protein: x / Nitrite: x   Leuk Esterase: x / RBC: x / WBC x   Sq Epi: x / Non Sq Epi: x / Bacteria: x                            RADIOLOGY, ECG, & ADDITIONAL TESTS:      RECENT DIAGNOSTIC ORDERS:  MR Foot No Cont, Right: Routine   Indication: OA on xray r/o infection vs gout  Transport: Wheelchair (05-03-24 @ 17:26)  12 Lead ECG (05-03-24 @ 15:55)      MEDICATIONS:  MEDICATIONS  (STANDING):  amLODIPine   Tablet 5 milliGRAM(s) Oral daily  aspirin  chewable 81 milliGRAM(s) Oral daily  atorvastatin 40 milliGRAM(s) Oral at bedtime  cefTRIAXone   IVPB      cefTRIAXone   IVPB 2000 milliGRAM(s) IV Intermittent every 24 hours  chlorhexidine 0.12% Liquid 15 milliLiter(s) Swish and Spit two times a day  clindamycin IVPB 900 milliGRAM(s) IV Intermittent every 8 hours  enoxaparin Injectable 40 milliGRAM(s) SubCutaneous every 24 hours  gabapentin 100 milliGRAM(s) Oral three times a day  ibuprofen  Tablet. 600 milliGRAM(s) Oral two times a day  levothyroxine 25 MICROGram(s) Oral daily  lidocaine   4% Patch 1 Patch Transdermal every 24 hours  melatonin 5 milliGRAM(s) Oral at bedtime  pantoprazole    Tablet 40 milliGRAM(s) Oral before breakfast    MEDICATIONS  (PRN):  acetaminophen     Tablet .. 650 milliGRAM(s) Oral every 6 hours PRN Temp greater or equal to 38C (100.4F), Mild Pain (1 - 3)  methocarbamol 750 milliGRAM(s) Oral every 8 hours PRN Muscle Spasm      HOME MEDICATIONS:  acetaminophen 325 mg oral tablet (05-03)  amLODIPine 5 mg oral tablet (05-03)  aspirin 81 mg oral tablet, chewable (05-03)  cyclobenzaprine 10 mg oral tablet (04-28)  gabapentin 100 mg oral capsule (05-03)  ibuprofen 400 mg oral tablet (05-03)  levothyroxine 25 mcg (0.025 mg) oral tablet (05-03)  methocarbamol 750 mg oral tablet (05-03)  rosuvastatin 10 mg oral tablet (02-23)      PHYSICAL EXAM:  General: awake, alert, NAD, much improved left lower cheek swilling and sumandibular area, non tender   Lungs:  clear to ausculations b/l, normal resp effort  Heart: regular ryhthm   Abdomen: soft, non tender non distended  Ext: no edema, can move all  his extremities , R feet dorsal aspect mid area improving, no tender but some redness and mild localozed edema note

## 2024-05-04 NOTE — PROGRESS NOTE ADULT - ASSESSMENT
63 year old male PMHx HTN, HLD, COPD, Hypothyroidism, presents to the ED for left neck swelling after putting in dentures that don't fit well. He went to urgent care yesterday and was started on Azithro for strep throat. CT neck prelim showing infectious process without distinct drainable fluid collection. S/p #17 tooth extraction with pus expressed. ENT consulted, recommend IR drainage, neuro IR evaluated not amenable to collection. OMFS consulted, s/p I&D with 3cc of purulent drainage expressed. Patient with history of OA in foot with monthly steroid injections, complaining of worsening forefoot pain with associated redness and swelling.  Stable for follow up with podiatry as outpatient with MRI foot as outpatient. Culture consistent with normal oral joanne. ID recs PO levaquin 750 mg daily and PO flagyl 500mg TID to complete 14 days total of antibiotics.    # Left submandibular gland abscess  - no sepsis poa  - CT neck Rim-enhancing fluid collection along the inner margin of the left mandibular body measuring 1 x 2 x 2 cm (TRV, AP, CC; ser 3 im 70) consistent with abscess. Extensive associated soft tissue swelling including: -Left  space edema and expansion.-Left submandibular gland edema. -Phlegmon within the submandibular space with deviation of the floor of mouth structures to the right. -Left oropharyngeal, supraglottic laryngeal and piriform sinus edema. The left pyriform sinus is completely effaced. -The uvula is edematous.  - s/p Decadron x3 doses  - c/w IV Clindamycin  - c/w PPI  - Blood culture NGTD  - Dental consult appreciated - s/p non-surgical extraction of #17 completed, purulent material expressed  - Dental F/U with outpatient dentist for comprehensive dental care or [facility] dental clinic   - ENT consulted, recommend IR drainage of absecess  - Neuro IR consulted, Dr. Smith reviewed images not amenable for drainage  - OMFS s/p I&D 5/2 with 3cc purulence drained   - soft diet. Advance as tolerated  - pain control  - peridex BID  - Patient to follow-up outpatient in dental clinic one week after discharge  - ID consult appreciated  - ESR 73, .6 -4/29  - ESR 88,  .7 - 5/2  - Strep cx throat Negative  - Ceftriaxone 2g q24h IV  - Clinda 900mg q8h IV   - WCX likely oral joanne  - D/C on PO levaquin 750 mg daily and PO flagyl 500mg TID to complete 14 days total of antibiotics (last day 5/14)  - EKG QTC < 500 on 5/3 can start fluoroquinolone therapy   - WBC trend 11.78--> 10.43--> 17.55 --> 10.3 >9.20    #Right foot pain  #OA?  - Foot with new swelling and redness, concerning for gout vs infection  - Uric acid 4.3  - Check xray foot  - Podiatry consult  - Toradol x1 dose and see response, will likely need NSAIDs  Weight Bearing Status; WBAT w/ walker  - Recommend Right Foot MRI but if this is going to delay patient's discharge, MRI can be done outpatient.   Patient to follow up in Dr. Fleming outpatient clinic.     # Transaminitis, mild  - AST 61, ALT 51 on admission  - no abdominal pain  - avoid hepatotoxic meds  - LFTs improving  - Outpatient follow up     # HTN  - c/w Amlodipine 5mg qd    # HLD  - c/w statin    # Hypothyroidism   - c/w Synthroid 25 mcg qd    #DVT ppx: Lovenox  #GI ppx: PPI  #Diet: as tolerated  #Activity: AAT  #Dispo: Med   63 year old male PMHx HTN, HLD, COPD, Hypothyroidism, presents to the ED for left neck swelling after putting in dentures that don't fit well. He went to urgent care yesterday and was started on Azithro for strep throat. CT neck prelim showing infectious process without distinct drainable fluid collection. S/p #17 tooth extraction with pus expressed. ENT consulted, recommend IR drainage, neuro IR evaluated not amenable to collection. OMFS consulted, s/p I&D with 3cc of purulent drainage expressed. Patient with history of OA in foot with monthly steroid injections, complaining of worsening forefoot pain with associated redness and swelling.  Stable for follow up with podiatry as outpatient with MRI foot as outpatient. Culture consistent with normal oral joanne. ID recs PO levaquin 750 mg daily and PO flagyl 500mg TID to complete 14 days total of antibiotics.    # Left submandibular gland abscess  - no sepsis poa  - CT neck Rim-enhancing fluid collection along the inner margin of the left mandibular body measuring 1 x 2 x 2 cm (TRV, AP, CC; ser 3 im 70) consistent with abscess. Extensive associated soft tissue swelling including: -Left  space edema and expansion.-Left submandibular gland edema. -Phlegmon within the submandibular space with deviation of the floor of mouth structures to the right. -Left oropharyngeal, supraglottic laryngeal and piriform sinus edema. The left pyriform sinus is completely effaced. -The uvula is edematous.  - s/p Decadron x3 doses  - c/w IV Clindamycin  - c/w PPI  - Blood culture NGTD  - Dental consult appreciated - s/p non-surgical extraction of #17 completed, purulent material expressed  - Dental F/U with outpatient dentist for comprehensive dental care or [facility] dental clinic   - ENT consulted, recommend IR drainage of absecess  - Neuro IR consulted, Dr. Smith reviewed images not amenable for drainage  - OMFS s/p I&D 5/2 with 3cc purulence drained   - soft diet. Advance as tolerated  - pain control  - peridex BID  - Patient to follow-up outpatient in dental clinic one week after discharge  - ID consult appreciated  - ESR 73, .6 -4/29  - ESR 88,  .7 - 5/2  - Strep cx throat Negative  - Ceftriaxone 2g q24h IV  - Clinda 900mg q8h IV   - WCX likely oral joanne  - D/C on PO levaquin 750 mg daily and PO flagyl 500mg TID to complete 14 days total of antibiotics (last day 5/14)  - EKG QTC < 500 on 5/3 can start fluoroquinolone therapy   - WBC trend 11.78--> 10.43--> 17.55 --> 10.3 >9.20    #Right foot pain  #OA?  - Foot with new swelling and redness, concerning for gout vs infection  - Uric acid 4.3  - Check xray foot  - Podiatry consult  - Toradol x1 dose and see response, will likely need NSAIDs  Weight Bearing Status; WBAT w/ walker  - Recommend Right Foot MRI but if this is going to delay patient's discharge, MRI can be done outpatient.   Patient to follow up in Dr. Fleming outpatient clinic.     # Transaminitis, mild  - AST 61, ALT 51 on admission  - no abdominal pain  - avoid hepatotoxic meds  - LFTs improving  - Outpatient follow up     # HTN  - c/w Amlodipine 5mg qd    # HLD  - c/w statin    # Hypothyroidism   - c/w Synthroid 25 mcg qd    #DVT ppx: Lovenox  #GI ppx: PPI  #Diet: as tolerated  #Activity: AAT  #Dispo: Med, called [facility] can expect speciation results today around 2-3pm

## 2024-05-04 NOTE — PHYSICAL THERAPY INITIAL EVALUATION ADULT - PERTINENT HX OF CURRENT PROBLEM, REHAB EVAL
Hide Include Location In Plan Question?: No Detail Level: Zone 63 year old male PMHx HTN, HLD, COPD, Hypothyroidism, presents to the ED for left neck swelling. Pt tried putting his dentures in that he has not worn for a long time. He reports pain and "nerve damage" since putting them in 1 week ago. 2 days ago he developed a sore throat and low grade fevers so he went to urgent care and was started on Azithro for strep throat. Pt reports difficulty opening his jaw and eating. He also reports worsening swelling on the left side of his neck so he came to the ED. Denies chest pain, SOB, dysphagia, N/V/D.

## 2024-05-04 NOTE — PROGRESS NOTE ADULT - SUBJECTIVE AND OBJECTIVE BOX
24H events:    Patient is a 63y old Male who presents with a chief complaint of neck pain (29 Apr 2024 12:07)    Primary diagnosis of Cellulitis of neck      Today is 6d of hospitalization. This morning patient was seen and examined at bedside, resting comfortably in bed.      Code Status:     Family communication:  Contact date:  Name of person contacted:  Relationship to patient:  Communication details:  What matters most:    PAST MEDICAL & SURGICAL HISTORY  HTN (hypertension)    HLD (hyperlipidemia)    Goiter    Emphysema/COPD  "mild" per pt    OA (osteoarthritis)    Back pain    Anxiety    Gallstones    COPD, mild    Hypothyroidism    H/O bilateral hip replacements    S/P inguinal hernia repair      SOCIAL HISTORY:  Social History:      ALLERGIES:  penicillin (Hives)    MEDICATIONS:  STANDING MEDICATIONS  amLODIPine   Tablet 5 milliGRAM(s) Oral daily  aspirin  chewable 81 milliGRAM(s) Oral daily  atorvastatin 40 milliGRAM(s) Oral at bedtime  cefTRIAXone   IVPB 2000 milliGRAM(s) IV Intermittent every 24 hours  cefTRIAXone   IVPB      chlorhexidine 0.12% Liquid 15 milliLiter(s) Swish and Spit two times a day  clindamycin IVPB 900 milliGRAM(s) IV Intermittent every 8 hours  enoxaparin Injectable 40 milliGRAM(s) SubCutaneous every 24 hours  gabapentin 100 milliGRAM(s) Oral three times a day  ibuprofen  Tablet. 600 milliGRAM(s) Oral two times a day  levothyroxine 25 MICROGram(s) Oral daily  lidocaine   4% Patch 1 Patch Transdermal every 24 hours  melatonin 5 milliGRAM(s) Oral at bedtime  pantoprazole    Tablet 40 milliGRAM(s) Oral before breakfast    PRN MEDICATIONS  acetaminophen     Tablet .. 650 milliGRAM(s) Oral every 6 hours PRN  methocarbamol 750 milliGRAM(s) Oral every 8 hours PRN    VITALS:   T(F): 97.5  HR: 61  BP: 124/77  RR: 18  SpO2: 96%    PHYSICAL EXAM:  GENERAL:   ( x) NAD, lying in bed comfortably     (  ) obtunded     (  ) lethargic     (  ) somnolent    HEAD: left sided submandibular swelling  ( x) Atraumatic     (  ) hematoma     (  ) laceration (specify location:       )     NECK:  (x) Supple     (  ) neck stiffness     (  ) nuchal rigidity     (  )  no JVD     (  ) JVD present ( -- cm)    HEART:  Rate -->     (x) normal rate     (  ) bradycardic     (  ) tachycardic  Rhythm -->     (x) regular     (  ) regularly irregular     (  ) irregularly irregular  Murmurs -->     (x) normal s1s2     (  ) systolic murmur     (  ) diastolic murmur     (  ) continuous murmur      (  ) S3 present     (  ) S4 present    LUNGS:   ( x)Unlabored respirations     (  ) tachypnea  ( x) B/L air entry     (  ) decreased breath sounds in:  (location     )    ( x) no adventitious sound     (  ) crackles     (  ) wheezing      (  ) rhonchi      (specify location:       )  (  ) chest wall tenderness (specify location:       )    ABDOMEN:   ( x) Soft     (  ) tense   |   (X  ) nondistended     (  ) distended   |   ( X ) +BS     (  ) hypoactive bowel sounds     (  ) hyperactive bowel sounds  ( x) nontender     (  ) RUQ tenderness     (  ) RLQ tenderness     (  ) LLQ tenderness     (  ) epigastric tenderness     (  ) diffuse tenderness  (  ) Splenomegaly      (  ) Hepatomegaly      (  ) Jaundice     (  ) ecchymosis     EXTREMITIES:  Right forefood with redness and swelling noted  (  ) Normal     (  ) Rash     (  ) ecchymosis     (  ) varicose veins      (  ) pitting edema     (  ) non-pitting edema   (  ) ulceration     (  ) gangrene:     (location:     )    NERVOUS SYSTEM:    ( x) A&Ox3     (  ) confused     (  ) lethargic  CN II-XII:     (  ) Intact     (  ) deficits found     (Specify:     )   Upper extremities:     (  ) no sensorimotor deficits     (  ) weakness     (  ) loss of proprioception/vibration     (  ) loss of touch/temperature (specify:    )  Lower extremities:     (  ) no sensorimotor deficits     (  ) weakness     (  ) loss of proprioception/vibration     (  ) loss of touch/temperature (specify:    )    SKIN:   ( X ) No rashes or lesions     (  ) maculopapular rash     (  ) pustules     (  ) vesicles     (  ) ulcer     (  ) ecchymosis     (specify location:     )      LABS:                        13.8   8.73  )-----------( 299      ( 04 May 2024 07:42 )             41.0     05-04    142  |  103  |  21<H>  ----------------------------<  125<H>  4.9   |  27  |  0.8    Ca    8.7      04 May 2024 07:42  Mg     2.3     05-04    TPro  6.0  /  Alb  3.4<L>  /  TBili  0.3  /  DBili  x   /  AST  37  /  ALT  60<H>  /  AlkPhos  72  05-03      Urinalysis Basic - ( 04 May 2024 07:42 )    Color: x / Appearance: x / SG: x / pH: x  Gluc: 125 mg/dL / Ketone: x  / Bili: x / Urobili: x   Blood: x / Protein: x / Nitrite: x   Leuk Esterase: x / RBC: x / WBC x   Sq Epi: x / Non Sq Epi: x / Bacteria: x            Culture - Abscess with Gram Stain (collected 02 May 2024 16:03)  Source: .Abscess Oral cavity  Gram Stain (03 May 2024 02:21):    Rare polymorphonuclear leukocytes seen per low power field    Rare Gram positive cocci in pairs seen per oil power field    Rare Yeast like cells seen per oil power field    Few Gram Negative Rods seen per oil power field    Culture - Group A Streptococcus (collected 01 May 2024 23:12)  Source: .Throat Throat  Final Report (03 May 2024 16:53):    No Streptococcus pyogenes (Group A) isolated          RADIOLOGY:                 24H events:    Patient is a 63y old Male who presents with a chief complaint of neck pain (29 Apr 2024 12:07)    Primary diagnosis of Cellulitis of neck      Today is 6d of hospitalization. This morning patient was seen and examined at bedside, resting comfortably in bed.  Swelling and pain improved in face.     Code Status: Full      PAST MEDICAL & SURGICAL HISTORY  HTN (hypertension)    HLD (hyperlipidemia)    Goiter    Emphysema/COPD  "mild" per pt    OA (osteoarthritis)    Back pain    Anxiety    Gallstones    COPD, mild    Hypothyroidism    H/O bilateral hip replacements    S/P inguinal hernia repair      SOCIAL HISTORY:  Social History:      ALLERGIES:  penicillin (Hives)    MEDICATIONS:  STANDING MEDICATIONS  amLODIPine   Tablet 5 milliGRAM(s) Oral daily  aspirin  chewable 81 milliGRAM(s) Oral daily  atorvastatin 40 milliGRAM(s) Oral at bedtime  cefTRIAXone   IVPB 2000 milliGRAM(s) IV Intermittent every 24 hours  cefTRIAXone   IVPB      chlorhexidine 0.12% Liquid 15 milliLiter(s) Swish and Spit two times a day  clindamycin IVPB 900 milliGRAM(s) IV Intermittent every 8 hours  enoxaparin Injectable 40 milliGRAM(s) SubCutaneous every 24 hours  gabapentin 100 milliGRAM(s) Oral three times a day  ibuprofen  Tablet. 600 milliGRAM(s) Oral two times a day  levothyroxine 25 MICROGram(s) Oral daily  lidocaine   4% Patch 1 Patch Transdermal every 24 hours  melatonin 5 milliGRAM(s) Oral at bedtime  pantoprazole    Tablet 40 milliGRAM(s) Oral before breakfast    PRN MEDICATIONS  acetaminophen     Tablet .. 650 milliGRAM(s) Oral every 6 hours PRN  methocarbamol 750 milliGRAM(s) Oral every 8 hours PRN    VITALS:   T(F): 97.5  HR: 61  BP: 124/77  RR: 18  SpO2: 96%    PHYSICAL EXAM:  GENERAL:   ( x) NAD, lying in bed comfortably     (  ) obtunded     (  ) lethargic     (  ) somnolent    HEAD: left sided submandibular swelling improved  ( x) Atraumatic     (  ) hematoma     (  ) laceration (specify location:       )     NECK:  (x) Supple     (  ) neck stiffness     (  ) nuchal rigidity     (  )  no JVD     (  ) JVD present ( -- cm)    HEART:  Rate -->     (x) normal rate     (  ) bradycardic     (  ) tachycardic  Rhythm -->     (x) regular     (  ) regularly irregular     (  ) irregularly irregular  Murmurs -->     (x) normal s1s2     (  ) systolic murmur     (  ) diastolic murmur     (  ) continuous murmur      (  ) S3 present     (  ) S4 present    LUNGS:   ( x)Unlabored respirations     (  ) tachypnea  ( x) B/L air entry     (  ) decreased breath sounds in:  (location     )    ( x) no adventitious sound     (  ) crackles     (  ) wheezing      (  ) rhonchi      (specify location:       )  (  ) chest wall tenderness (specify location:       )    ABDOMEN:   ( x) Soft     (  ) tense   |   (X  ) nondistended     (  ) distended   |   ( X ) +BS     (  ) hypoactive bowel sounds     (  ) hyperactive bowel sounds  ( x) nontender     (  ) RUQ tenderness     (  ) RLQ tenderness     (  ) LLQ tenderness     (  ) epigastric tenderness     (  ) diffuse tenderness  (  ) Splenomegaly      (  ) Hepatomegaly      (  ) Jaundice     (  ) ecchymosis     EXTREMITIES:  Right forefood with redness and swelling noted, improved from prior  (  ) Normal     (  ) Rash     (  ) ecchymosis     (  ) varicose veins      (  ) pitting edema     (  ) non-pitting edema   (  ) ulceration     (  ) gangrene:     (location:     )    NERVOUS SYSTEM:    ( x) A&Ox3     (  ) confused     (  ) lethargic  CN II-XII:     (  ) Intact     (  ) deficits found     (Specify:     )   Upper extremities:     (  ) no sensorimotor deficits     (  ) weakness     (  ) loss of proprioception/vibration     (  ) loss of touch/temperature (specify:    )  Lower extremities:     (  ) no sensorimotor deficits     (  ) weakness     (  ) loss of proprioception/vibration     (  ) loss of touch/temperature (specify:    )    SKIN:   ( X ) No rashes or lesions     (  ) maculopapular rash     (  ) pustules     (  ) vesicles     (  ) ulcer     (  ) ecchymosis     (specify location:     )      LABS:                        13.8   8.73  )-----------( 299      ( 04 May 2024 07:42 )             41.0     05-04    142  |  103  |  21<H>  ----------------------------<  125<H>  4.9   |  27  |  0.8    Ca    8.7      04 May 2024 07:42  Mg     2.3     05-04    TPro  6.0  /  Alb  3.4<L>  /  TBili  0.3  /  DBili  x   /  AST  37  /  ALT  60<H>  /  AlkPhos  72  05-03      Urinalysis Basic - ( 04 May 2024 07:42 )    Color: x / Appearance: x / SG: x / pH: x  Gluc: 125 mg/dL / Ketone: x  / Bili: x / Urobili: x   Blood: x / Protein: x / Nitrite: x   Leuk Esterase: x / RBC: x / WBC x   Sq Epi: x / Non Sq Epi: x / Bacteria: x            Culture - Abscess with Gram Stain (collected 02 May 2024 16:03)  Source: .Abscess Oral cavity  Gram Stain (03 May 2024 02:21):    Rare polymorphonuclear leukocytes seen per low power field    Rare Gram positive cocci in pairs seen per oil power field    Rare Yeast like cells seen per oil power field    Few Gram Negative Rods seen per oil power field    Culture - Group A Streptococcus (collected 01 May 2024 23:12)  Source: .Throat Throat  Final Report (03 May 2024 16:53):    No Streptococcus pyogenes (Group A) isolated          RADIOLOGY:

## 2024-05-04 NOTE — PHYSICAL THERAPY INITIAL EVALUATION ADULT - ADDITIONAL COMMENTS
Patient lives in house with 12 steps to enter and 12 steps to bedroom. Was independent in ADLs and ambulation no AD.

## 2024-05-04 NOTE — PHYSICAL THERAPY INITIAL EVALUATION ADULT - PLANNED THERAPY INTERVENTIONS, PT EVAL
No PT intervention needed. Patient independent inbed mobility and transfers, supervision in ambulation using RW. Contact PT if status changes

## 2024-05-05 LAB
ANION GAP SERPL CALC-SCNC: 10 MMOL/L — SIGNIFICANT CHANGE UP (ref 7–14)
BASOPHILS # BLD AUTO: 0.07 K/UL — SIGNIFICANT CHANGE UP (ref 0–0.2)
BASOPHILS NFR BLD AUTO: 0.8 % — SIGNIFICANT CHANGE UP (ref 0–1)
BUN SERPL-MCNC: 22 MG/DL — HIGH (ref 10–20)
CALCIUM SERPL-MCNC: 8.4 MG/DL — SIGNIFICANT CHANGE UP (ref 8.4–10.5)
CHLORIDE SERPL-SCNC: 107 MMOL/L — SIGNIFICANT CHANGE UP (ref 98–110)
CO2 SERPL-SCNC: 26 MMOL/L — SIGNIFICANT CHANGE UP (ref 17–32)
CREAT SERPL-MCNC: 0.8 MG/DL — SIGNIFICANT CHANGE UP (ref 0.7–1.5)
EGFR: 99 ML/MIN/1.73M2 — SIGNIFICANT CHANGE UP
EOSINOPHIL # BLD AUTO: 0.45 K/UL — SIGNIFICANT CHANGE UP (ref 0–0.7)
EOSINOPHIL NFR BLD AUTO: 5.2 % — SIGNIFICANT CHANGE UP (ref 0–8)
GLUCOSE SERPL-MCNC: 108 MG/DL — HIGH (ref 70–99)
HCT VFR BLD CALC: 38.7 % — LOW (ref 42–52)
HGB BLD-MCNC: 13 G/DL — LOW (ref 14–18)
IMM GRANULOCYTES NFR BLD AUTO: 0.9 % — HIGH (ref 0.1–0.3)
LYMPHOCYTES # BLD AUTO: 3.65 K/UL — HIGH (ref 1.2–3.4)
LYMPHOCYTES # BLD AUTO: 42 % — SIGNIFICANT CHANGE UP (ref 20.5–51.1)
MAGNESIUM SERPL-MCNC: 2.2 MG/DL — SIGNIFICANT CHANGE UP (ref 1.8–2.4)
MCHC RBC-ENTMCNC: 33.4 PG — HIGH (ref 27–31)
MCHC RBC-ENTMCNC: 33.6 G/DL — SIGNIFICANT CHANGE UP (ref 32–37)
MCV RBC AUTO: 99.5 FL — HIGH (ref 80–94)
MONOCYTES # BLD AUTO: 0.65 K/UL — HIGH (ref 0.1–0.6)
MONOCYTES NFR BLD AUTO: 7.5 % — SIGNIFICANT CHANGE UP (ref 1.7–9.3)
NEUTROPHILS # BLD AUTO: 3.8 K/UL — SIGNIFICANT CHANGE UP (ref 1.4–6.5)
NEUTROPHILS NFR BLD AUTO: 43.6 % — SIGNIFICANT CHANGE UP (ref 42.2–75.2)
NRBC # BLD: 0 /100 WBCS — SIGNIFICANT CHANGE UP (ref 0–0)
PLATELET # BLD AUTO: 295 K/UL — SIGNIFICANT CHANGE UP (ref 130–400)
PMV BLD: 10.2 FL — SIGNIFICANT CHANGE UP (ref 7.4–10.4)
POTASSIUM SERPL-MCNC: 4.6 MMOL/L — SIGNIFICANT CHANGE UP (ref 3.5–5)
POTASSIUM SERPL-SCNC: 4.6 MMOL/L — SIGNIFICANT CHANGE UP (ref 3.5–5)
RBC # BLD: 3.89 M/UL — LOW (ref 4.7–6.1)
RBC # FLD: 13.2 % — SIGNIFICANT CHANGE UP (ref 11.5–14.5)
SODIUM SERPL-SCNC: 143 MMOL/L — SIGNIFICANT CHANGE UP (ref 135–146)
WBC # BLD: 8.7 K/UL — SIGNIFICANT CHANGE UP (ref 4.8–10.8)
WBC # FLD AUTO: 8.7 K/UL — SIGNIFICANT CHANGE UP (ref 4.8–10.8)

## 2024-05-05 PROCEDURE — 99232 SBSQ HOSP IP/OBS MODERATE 35: CPT

## 2024-05-05 PROCEDURE — 73718 MRI LOWER EXTREMITY W/O DYE: CPT | Mod: 26,RT

## 2024-05-05 RX ADMIN — AMLODIPINE BESYLATE 5 MILLIGRAM(S): 2.5 TABLET ORAL at 05:44

## 2024-05-05 RX ADMIN — Medication 600 MILLIGRAM(S): at 17:32

## 2024-05-05 RX ADMIN — LIDOCAINE 1 PATCH: 4 CREAM TOPICAL at 16:46

## 2024-05-05 RX ADMIN — GABAPENTIN 100 MILLIGRAM(S): 400 CAPSULE ORAL at 16:46

## 2024-05-05 RX ADMIN — ATORVASTATIN CALCIUM 40 MILLIGRAM(S): 80 TABLET, FILM COATED ORAL at 21:06

## 2024-05-05 RX ADMIN — Medication 100 MILLIGRAM(S): at 16:46

## 2024-05-05 RX ADMIN — Medication 81 MILLIGRAM(S): at 12:36

## 2024-05-05 RX ADMIN — GABAPENTIN 100 MILLIGRAM(S): 400 CAPSULE ORAL at 05:43

## 2024-05-05 RX ADMIN — PANTOPRAZOLE SODIUM 40 MILLIGRAM(S): 20 TABLET, DELAYED RELEASE ORAL at 05:48

## 2024-05-05 RX ADMIN — ENOXAPARIN SODIUM 40 MILLIGRAM(S): 100 INJECTION SUBCUTANEOUS at 21:20

## 2024-05-05 RX ADMIN — Medication 25 MICROGRAM(S): at 05:44

## 2024-05-05 RX ADMIN — CHLORHEXIDINE GLUCONATE 15 MILLILITER(S): 213 SOLUTION TOPICAL at 05:48

## 2024-05-05 RX ADMIN — Medication 5 MILLIGRAM(S): at 21:06

## 2024-05-05 RX ADMIN — CEFTRIAXONE 100 MILLIGRAM(S): 500 INJECTION, POWDER, FOR SOLUTION INTRAMUSCULAR; INTRAVENOUS at 16:46

## 2024-05-05 RX ADMIN — GABAPENTIN 100 MILLIGRAM(S): 400 CAPSULE ORAL at 21:06

## 2024-05-05 RX ADMIN — Medication 600 MILLIGRAM(S): at 05:43

## 2024-05-05 RX ADMIN — Medication 100 MILLIGRAM(S): at 05:43

## 2024-05-05 RX ADMIN — CHLORHEXIDINE GLUCONATE 15 MILLILITER(S): 213 SOLUTION TOPICAL at 17:32

## 2024-05-05 RX ADMIN — Medication 100 MILLIGRAM(S): at 21:05

## 2024-05-05 NOTE — PROGRESS NOTE ADULT - SUBJECTIVE AND OBJECTIVE BOX
T H I S   I S    N O  T   A    F I N A L I Z E D   N O T ADA ROMERO, LIGIA  63y, Male  Allergy: penicillin (Hives)    Hospital Day: 7d    Patient seen and examined earlier today.     PMH/PSH:  PAST MEDICAL & SURGICAL HISTORY:  HTN (hypertension)      HLD (hyperlipidemia)      Goiter      Emphysema/COPD  "mild" per pt      OA (osteoarthritis)      Back pain      Anxiety      Gallstones      COPD, mild      Hypothyroidism      H/O bilateral hip replacements      S/P inguinal hernia repair          LAST 24-Hr EVENTS:    VITALS:  T(F): 98.2 (05-05-24 @ 00:40), Max: 98.5 (05-04-24 @ 15:00)  HR: 55 (05-05-24 @ 00:40)  BP: 112/63 (05-05-24 @ 00:40) (112/63 - 117/68)  RR: 16 (05-05-24 @ 00:40)  SpO2: 93% (05-05-24 @ 00:40)          TESTS & MEASUREMENTS:  Weight/BMI      05-04-24 @ 07:01  -  05-05-24 @ 07:00  --------------------------------------------------------  IN: 660 mL / OUT: 1250 mL / NET: -590 mL                            13.8   8.73  )-----------( 299      ( 04 May 2024 07:42 )             41.0         05-04    142  |  103  |  21<H>  ----------------------------<  125<H>  4.9   |  27  |  0.8    Ca    8.7      04 May 2024 07:42  Mg     2.3     05-04              Culture - Abscess with Gram Stain (collected 05-02-24 @ 16:03)  Source: .Abscess Oral cavity  Gram Stain (05-03-24 @ 02:21):    Rare polymorphonuclear leukocytes seen per low power field    Rare Gram positive cocci in pairs seen per oil power field    Rare Yeast like cells seen per oil power field    Few Gram Negative Rods seen per oil power field  Preliminary Report (05-04-24 @ 15:23):    Normal mouth joanne isolated    Culture - Group A Streptococcus (collected 05-01-24 @ 23:12)  Source: .Throat Throat  Final Report (05-03-24 @ 16:53):    No Streptococcus pyogenes (Group A) isolated    Culture - Blood (collected 04-29-24 @ 06:35)  Source: .Blood None  Final Report (05-04-24 @ 14:01):    No growth at 5 days      Urinalysis Basic - ( 04 May 2024 07:42 )    Color: x / Appearance: x / SG: x / pH: x  Gluc: 125 mg/dL / Ketone: x  / Bili: x / Urobili: x   Blood: x / Protein: x / Nitrite: x   Leuk Esterase: x / RBC: x / WBC x   Sq Epi: x / Non Sq Epi: x / Bacteria: x                            RADIOLOGY, ECG, & ADDITIONAL TESTS:      RECENT DIAGNOSTIC ORDERS:  Magnesium: AM Sched. Collection: 06-May-2024 04:30 (05-04-24 @ 14:09)  Basic Metabolic Panel: AM Sched. Collection: 06-May-2024 04:30 (05-04-24 @ 14:09)  Complete Blood Count + Automated Diff: AM Sched. Collection: 06-May-2024 04:30 (05-04-24 @ 14:09)  Magnesium: AM Sched. Collection: 05-May-2024 04:30 (05-04-24 @ 14:09)  Basic Metabolic Panel: AM Sched. Collection: 05-May-2024 04:30 (05-04-24 @ 14:09)  Complete Blood Count + Automated Diff: AM Sched. Collection: 05-May-2024 04:30 (05-04-24 @ 14:09)      MEDICATIONS:  MEDICATIONS  (STANDING):  amLODIPine   Tablet 5 milliGRAM(s) Oral daily  aspirin  chewable 81 milliGRAM(s) Oral daily  atorvastatin 40 milliGRAM(s) Oral at bedtime  cefTRIAXone   IVPB 2000 milliGRAM(s) IV Intermittent every 24 hours  cefTRIAXone   IVPB      chlorhexidine 0.12% Liquid 15 milliLiter(s) Swish and Spit two times a day  clindamycin IVPB 900 milliGRAM(s) IV Intermittent every 8 hours  enoxaparin Injectable 40 milliGRAM(s) SubCutaneous every 24 hours  gabapentin 100 milliGRAM(s) Oral three times a day  ibuprofen  Tablet. 600 milliGRAM(s) Oral two times a day  levothyroxine 25 MICROGram(s) Oral daily  lidocaine   4% Patch 1 Patch Transdermal every 24 hours  melatonin 5 milliGRAM(s) Oral at bedtime  pantoprazole    Tablet 40 milliGRAM(s) Oral before breakfast    MEDICATIONS  (PRN):  acetaminophen     Tablet .. 650 milliGRAM(s) Oral every 6 hours PRN Temp greater or equal to 38C (100.4F), Mild Pain (1 - 3)  methocarbamol 750 milliGRAM(s) Oral every 8 hours PRN Muscle Spasm      HOME MEDICATIONS:  acetaminophen 325 mg oral tablet (05-03)  amLODIPine 5 mg oral tablet (05-03)  aspirin 81 mg oral tablet, chewable (05-03)  cyclobenzaprine 10 mg oral tablet (04-28)  gabapentin 100 mg oral capsule (05-03)   mg oral tablet (05-04)  levothyroxine 25 mcg (0.025 mg) oral tablet (05-03)  methocarbamol 750 mg oral tablet (05-03)  rosuvastatin 10 mg oral tablet (02-23)      PHYSICAL EXAM:  GENERAL:   CHEST/LUNG:   HEART:   ABDOMEN:   EXTREMITIES:             LIGIA ROMERO  63y, Male  Allergy: penicillin (Hives)    Hospital Day: 7d    Patient seen and examined earlier today. he feels much better, his foot as well is better     PMH/PSH:  PAST MEDICAL & SURGICAL HISTORY:  HTN (hypertension)      HLD (hyperlipidemia)      Goiter      Emphysema/COPD  "mild" per pt      OA (osteoarthritis)      Back pain      Anxiety      Gallstones      COPD, mild      Hypothyroidism      H/O bilateral hip replacements      S/P inguinal hernia repair          LAST 24-Hr EVENTS:    VITALS:  T(F): 98.2 (05-05-24 @ 00:40), Max: 98.5 (05-04-24 @ 15:00)  HR: 55 (05-05-24 @ 00:40)  BP: 112/63 (05-05-24 @ 00:40) (112/63 - 117/68)  RR: 16 (05-05-24 @ 00:40)  SpO2: 93% (05-05-24 @ 00:40)          TESTS & MEASUREMENTS:  Weight/BMI      05-04-24 @ 07:01  -  05-05-24 @ 07:00  --------------------------------------------------------  IN: 660 mL / OUT: 1250 mL / NET: -590 mL                            13.8   8.73  )-----------( 299      ( 04 May 2024 07:42 )             41.0         05-04    142  |  103  |  21<H>  ----------------------------<  125<H>  4.9   |  27  |  0.8    Ca    8.7      04 May 2024 07:42  Mg     2.3     05-04              Culture - Abscess with Gram Stain (collected 05-02-24 @ 16:03)  Source: .Abscess Oral cavity  Gram Stain (05-03-24 @ 02:21):    Rare polymorphonuclear leukocytes seen per low power field    Rare Gram positive cocci in pairs seen per oil power field    Rare Yeast like cells seen per oil power field    Few Gram Negative Rods seen per oil power field  Preliminary Report (05-04-24 @ 15:23):    Normal mouth joanne isolated    Culture - Group A Streptococcus (collected 05-01-24 @ 23:12)  Source: .Throat Throat  Final Report (05-03-24 @ 16:53):    No Streptococcus pyogenes (Group A) isolated    Culture - Blood (collected 04-29-24 @ 06:35)  Source: .Blood None  Final Report (05-04-24 @ 14:01):    No growth at 5 days      Urinalysis Basic - ( 04 May 2024 07:42 )    Color: x / Appearance: x / SG: x / pH: x  Gluc: 125 mg/dL / Ketone: x  / Bili: x / Urobili: x   Blood: x / Protein: x / Nitrite: x   Leuk Esterase: x / RBC: x / WBC x   Sq Epi: x / Non Sq Epi: x / Bacteria: x                            RADIOLOGY, ECG, & ADDITIONAL TESTS:      RECENT DIAGNOSTIC ORDERS:  Magnesium: AM Sched. Collection: 06-May-2024 04:30 (05-04-24 @ 14:09)  Basic Metabolic Panel: AM Sched. Collection: 06-May-2024 04:30 (05-04-24 @ 14:09)  Complete Blood Count + Automated Diff: AM Sched. Collection: 06-May-2024 04:30 (05-04-24 @ 14:09)  Magnesium: AM Sched. Collection: 05-May-2024 04:30 (05-04-24 @ 14:09)  Basic Metabolic Panel: AM Sched. Collection: 05-May-2024 04:30 (05-04-24 @ 14:09)  Complete Blood Count + Automated Diff: AM Sched. Collection: 05-May-2024 04:30 (05-04-24 @ 14:09)      MEDICATIONS:  MEDICATIONS  (STANDING):  amLODIPine   Tablet 5 milliGRAM(s) Oral daily  aspirin  chewable 81 milliGRAM(s) Oral daily  atorvastatin 40 milliGRAM(s) Oral at bedtime  cefTRIAXone   IVPB 2000 milliGRAM(s) IV Intermittent every 24 hours  cefTRIAXone   IVPB      chlorhexidine 0.12% Liquid 15 milliLiter(s) Swish and Spit two times a day  clindamycin IVPB 900 milliGRAM(s) IV Intermittent every 8 hours  enoxaparin Injectable 40 milliGRAM(s) SubCutaneous every 24 hours  gabapentin 100 milliGRAM(s) Oral three times a day  ibuprofen  Tablet. 600 milliGRAM(s) Oral two times a day  levothyroxine 25 MICROGram(s) Oral daily  lidocaine   4% Patch 1 Patch Transdermal every 24 hours  melatonin 5 milliGRAM(s) Oral at bedtime  pantoprazole    Tablet 40 milliGRAM(s) Oral before breakfast    MEDICATIONS  (PRN):  acetaminophen     Tablet .. 650 milliGRAM(s) Oral every 6 hours PRN Temp greater or equal to 38C (100.4F), Mild Pain (1 - 3)  methocarbamol 750 milliGRAM(s) Oral every 8 hours PRN Muscle Spasm      HOME MEDICATIONS:  acetaminophen 325 mg oral tablet (05-03)  amLODIPine 5 mg oral tablet (05-03)  aspirin 81 mg oral tablet, chewable (05-03)  cyclobenzaprine 10 mg oral tablet (04-28)  gabapentin 100 mg oral capsule (05-03)   mg oral tablet (05-04)  levothyroxine 25 mcg (0.025 mg) oral tablet (05-03)  methocarbamol 750 mg oral tablet (05-03)  rosuvastatin 10 mg oral tablet (02-23)      PHYSICAL EXAM:  General: awake, alert, NAD, much improved left lower cheek swilling and sumandibular area, non tender   Lungs:  clear to ausculations b/l, normal resp effort  Heart: regular ryhthm   Abdomen: soft, non tender non distended  Ext: no edema, can move all  his extremities , R feet dorsal aspect much improved no tender redness or edema

## 2024-05-05 NOTE — PROGRESS NOTE ADULT - ASSESSMENT
62 y/o man with PMH of HTN, hyperlipidemia, COPD, and hypothyroidism presented to the ED for left neck swelling and difficulty opening his jaw and eating.    []left submandibular edema/ Dental infection #17 with deep neck infection s/p extraction  #Leukocytosis improving   CT neck reported Rim-enhancing fluid collection along the inner margin of the left  mandibular body measuring 1 x 2 x 2 cm (TRV, AP, CC; ser 3 im 70)   consistent with abscess.   Extensive associated soft tissue swelling  including:  -Left  space edema and expansion.  -Left submandibular gland edema. -Phlegmon within the submandibular space with deviation of the floor of   mouth structures to the right. -Left oropharyngeal, supraglottic laryngeal and piriform sinus edema. The   left pyriform sinus is completely effaced. -The uvula is edematous.   2. Multiple reactive appearing left 1B and 2 lymph nodes.   3. Left mandibular and maxillary dental caries and periapical lucencies. Recommend correlation with dental exam.   4. Absent left thyroid lobe. Right thyroid lobe nodule measuring 2 cm, recommend thyroid sonogram for further evaluation.    - s/p extraction of tooth #17 with expression of pus on 4/29  - ENT following - requested IR eval of abscess drainage- as per IR  collection is not amenable for drainage.  get [facility] surgery consult appreciated s/p Incision and drainage of intraoral performed by OMFS on 5/2, with 3 cc of purulence drained and submitted for culture.  - continue with Clinda IV AND ceftriaxone for now   - dental follow up  - tolerating diet   f/u cultures including new culture from today   ESR 73 ->88  crp 174.6 --->107.9   Strep cx throat negative   - peridex swish and spit q12  - pain control with motrin/tylenol   ID f/u appreciated- would wait for the culture results     #Mild Transaminitis   - monitor and outpatient follow up as stable     # HTN/DL  - continue home meds    #hypothyroid  - on synthroid   op ENDOcrine eval for thyroid nodule and sono     #COPD   - not in exacerbation     []chronic feet arthrtitic pain which he uses voltaren gel and local steroid injection by podiatry as op    lidocaine patch   , uric acid 4.3   ONE dose of morphine   c/w ibuprofen for now   Foot x ray - Severe osteoarthritis of the midfoot and talonavicular joints, increased since June 2018, with dorsal midfoot   soft tissue swelling overlying the increased dorsal osteophytes, likely related to increased joint effusion. Findings can reflect progression of   neuropathic osteoarthropathy.  Stable osteoarthritis of the forefoot and tibiotalar joints. Chronic ossification in the region of the plantar fascia consistent with prior   inflammation.  rolling walker papers done  PT/OT     Podiatry input appreciated - Recommend Right Foot MRI but if this is going to delay patient's discharge,   MRI done, pending report       dvt PPx and GI ppx     Pending  symptoms labs,, cultures , podiatry, MRI report     discussed in n details with the patient

## 2024-05-06 ENCOUNTER — TRANSCRIPTION ENCOUNTER (OUTPATIENT)
Age: 64
End: 2024-05-06

## 2024-05-06 VITALS
TEMPERATURE: 98 F | HEART RATE: 62 BPM | DIASTOLIC BLOOD PRESSURE: 71 MMHG | SYSTOLIC BLOOD PRESSURE: 127 MMHG | RESPIRATION RATE: 18 BRPM

## 2024-05-06 LAB
ANION GAP SERPL CALC-SCNC: 10 MMOL/L — SIGNIFICANT CHANGE UP (ref 7–14)
BASOPHILS # BLD AUTO: 0.05 K/UL — SIGNIFICANT CHANGE UP (ref 0–0.2)
BASOPHILS NFR BLD AUTO: 0.6 % — SIGNIFICANT CHANGE UP (ref 0–1)
BUN SERPL-MCNC: 18 MG/DL — SIGNIFICANT CHANGE UP (ref 10–20)
CALCIUM SERPL-MCNC: 8.6 MG/DL — SIGNIFICANT CHANGE UP (ref 8.4–10.4)
CHLORIDE SERPL-SCNC: 106 MMOL/L — SIGNIFICANT CHANGE UP (ref 98–110)
CO2 SERPL-SCNC: 24 MMOL/L — SIGNIFICANT CHANGE UP (ref 17–32)
CREAT SERPL-MCNC: 0.8 MG/DL — SIGNIFICANT CHANGE UP (ref 0.7–1.5)
EGFR: 99 ML/MIN/1.73M2 — SIGNIFICANT CHANGE UP
EOSINOPHIL # BLD AUTO: 0.45 K/UL — SIGNIFICANT CHANGE UP (ref 0–0.7)
EOSINOPHIL NFR BLD AUTO: 5.6 % — SIGNIFICANT CHANGE UP (ref 0–8)
GLUCOSE SERPL-MCNC: 105 MG/DL — HIGH (ref 70–99)
HCT VFR BLD CALC: 37.7 % — LOW (ref 42–52)
HGB BLD-MCNC: 12.8 G/DL — LOW (ref 14–18)
IMM GRANULOCYTES NFR BLD AUTO: 1.1 % — HIGH (ref 0.1–0.3)
LYMPHOCYTES # BLD AUTO: 3.56 K/UL — HIGH (ref 1.2–3.4)
LYMPHOCYTES # BLD AUTO: 44 % — SIGNIFICANT CHANGE UP (ref 20.5–51.1)
MAGNESIUM SERPL-MCNC: 2.2 MG/DL — SIGNIFICANT CHANGE UP (ref 1.8–2.4)
MCHC RBC-ENTMCNC: 33 PG — HIGH (ref 27–31)
MCHC RBC-ENTMCNC: 34 G/DL — SIGNIFICANT CHANGE UP (ref 32–37)
MCV RBC AUTO: 97.2 FL — HIGH (ref 80–94)
MONOCYTES # BLD AUTO: 0.48 K/UL — SIGNIFICANT CHANGE UP (ref 0.1–0.6)
MONOCYTES NFR BLD AUTO: 5.9 % — SIGNIFICANT CHANGE UP (ref 1.7–9.3)
NEUTROPHILS # BLD AUTO: 3.47 K/UL — SIGNIFICANT CHANGE UP (ref 1.4–6.5)
NEUTROPHILS NFR BLD AUTO: 42.8 % — SIGNIFICANT CHANGE UP (ref 42.2–75.2)
NRBC # BLD: 0 /100 WBCS — SIGNIFICANT CHANGE UP (ref 0–0)
PLATELET # BLD AUTO: 314 K/UL — SIGNIFICANT CHANGE UP (ref 130–400)
PMV BLD: 9.3 FL — SIGNIFICANT CHANGE UP (ref 7.4–10.4)
POTASSIUM SERPL-MCNC: 4.6 MMOL/L — SIGNIFICANT CHANGE UP (ref 3.5–5)
POTASSIUM SERPL-SCNC: 4.6 MMOL/L — SIGNIFICANT CHANGE UP (ref 3.5–5)
RBC # BLD: 3.88 M/UL — LOW (ref 4.7–6.1)
RBC # FLD: 13.2 % — SIGNIFICANT CHANGE UP (ref 11.5–14.5)
SODIUM SERPL-SCNC: 140 MMOL/L — SIGNIFICANT CHANGE UP (ref 135–146)
WBC # BLD: 8.1 K/UL — SIGNIFICANT CHANGE UP (ref 4.8–10.8)
WBC # FLD AUTO: 8.1 K/UL — SIGNIFICANT CHANGE UP (ref 4.8–10.8)

## 2024-05-06 PROCEDURE — 93010 ELECTROCARDIOGRAM REPORT: CPT

## 2024-05-06 PROCEDURE — 99239 HOSP IP/OBS DSCHRG MGMT >30: CPT

## 2024-05-06 RX ORDER — METRONIDAZOLE 500 MG
1 TABLET ORAL
Qty: 24 | Refills: 0
Start: 2024-05-06 | End: 2024-05-13

## 2024-05-06 RX ORDER — LEVOFLOXACIN 5 MG/ML
1 INJECTION, SOLUTION INTRAVENOUS
Qty: 8 | Refills: 0
Start: 2024-05-06 | End: 2024-05-13

## 2024-05-06 RX ADMIN — LIDOCAINE 1 PATCH: 4 CREAM TOPICAL at 06:17

## 2024-05-06 RX ADMIN — PANTOPRAZOLE SODIUM 40 MILLIGRAM(S): 20 TABLET, DELAYED RELEASE ORAL at 05:29

## 2024-05-06 RX ADMIN — Medication 600 MILLIGRAM(S): at 05:27

## 2024-05-06 RX ADMIN — LIDOCAINE 1 PATCH: 4 CREAM TOPICAL at 01:34

## 2024-05-06 RX ADMIN — GABAPENTIN 100 MILLIGRAM(S): 400 CAPSULE ORAL at 13:12

## 2024-05-06 RX ADMIN — GABAPENTIN 100 MILLIGRAM(S): 400 CAPSULE ORAL at 05:28

## 2024-05-06 RX ADMIN — METHOCARBAMOL 750 MILLIGRAM(S): 500 TABLET, FILM COATED ORAL at 13:12

## 2024-05-06 RX ADMIN — Medication 100 MILLIGRAM(S): at 05:26

## 2024-05-06 RX ADMIN — CHLORHEXIDINE GLUCONATE 15 MILLILITER(S): 213 SOLUTION TOPICAL at 05:27

## 2024-05-06 RX ADMIN — Medication 100 MILLIGRAM(S): at 13:12

## 2024-05-06 RX ADMIN — Medication 81 MILLIGRAM(S): at 11:03

## 2024-05-06 RX ADMIN — Medication 25 MICROGRAM(S): at 05:27

## 2024-05-06 RX ADMIN — AMLODIPINE BESYLATE 5 MILLIGRAM(S): 2.5 TABLET ORAL at 05:27

## 2024-05-06 RX ADMIN — LIDOCAINE 1 PATCH: 4 CREAM TOPICAL at 13:13

## 2024-05-06 NOTE — DISCHARGE NOTE NURSING/CASE MANAGEMENT/SOCIAL WORK - PATIENT PORTAL LINK FT
You can access the FollowMyHealth Patient Portal offered by [facility] by registering at the following website: [email or web address]. By joining [facility]’s FollowMyHealth portal, you will also be able to view your health information using other applications (apps) compatible with our system.

## 2024-05-06 NOTE — PROGRESS NOTE ADULT - ASSESSMENT
ASSESSMENT  63 year old male PMHx HTN, HLD, COPD, Hypothyroidism, presents to the ED for left neck swelling.     IMPRESSION  #Dental infection #17 with deep neck infection s/p extraction  s/p 5/2 I&D by OMFS- WCX c. glabrata  CX - oral joanne  WBC 11  #Foot pain R  < from: MR Foot No Cont, Right (05.05.24 @ 16:13) >  Complete replacement of bony marrow with resultant edema within the   proximal shaft of the second metatarsal, central cuneiform and to lesser   extent the medial lateral cuneiforms. This is seen to lesser extent   within the central lateral navicular bone. Findings could reflect acute   osteomyelitis. Although there are no definite soft tissue ulcerations,   osteomyelitis may be present. Consider correlation with a nuclear   medicine white blood cell study.  Extensive osteoarthritic changes, which could reflect progression of   neuropathic osteoarthropathy. Diffuse periosteal thickening the second   metatarsal shaft which may reflect a chronic process.  Extensive juxta articular cystic processes emanating from the joints as   described.  #Recent strep + (per patient, not in HIE)  #Abx allergy: penicillin (Hives)    Creatinine: 1.0 (04-30-24 @ 07:30)      Weight (kg): 90.7 (04-28-24 @ 13:58)    RECOMMENDATIONS  - Ceftriaxone 2g q24h IV  - Clinda 900mg q8h IV   - Appreciate Dental/ENT consults  - WCX consistent with  oral joanne  - anticipate D/C on PO levaquin 750 mg daily and PO flagyl 500mg TID to complete 14 days total of antibiotics  end 5/11  - Clinical exam of R foot is not consistent with osteomyelitis , no ulcers/wound   - f/u Podiatry     If any questions, please send a message or call on [facility] Teams  Please continue to update ID with any pertinent new laboratory, radiographic findings, or change in clinical status

## 2024-05-06 NOTE — PROGRESS NOTE ADULT - SUBJECTIVE AND OBJECTIVE BOX
TONIALIGIA PIERRE  63y, Male  Allergy: penicillin (Hives)      LOS  8d    CHIEF COMPLAINT: neck pain (29 Apr 2024 12:07)      INTERVAL EVENTS/HPI  - No acute events overnight  - T(F): , Max: 97.2 (05-06-24 @ 07:00)  - Denies any worsening symptoms  - Tolerating medication  - WBC Count: 8.10 (05-06-24 @ 07:21)  WBC Count: 8.70 (05-05-24 @ 08:33)     - Creatinine: 0.8 (05-06-24 @ 07:21)  Creatinine: 0.8 (05-05-24 @ 08:33)       ROS  General: Denies rigors, nightsweats  HEENT: Denies headache, rhinorrhea, sore throat, eye pain  CV: Denies CP, palpitations  PULM: Denies wheezing, hemoptysis  GI: Denies hematemesis, hematochezia, melena  : Denies discharge, hematuria  MSK: Denies arthralgias, myalgias  SKIN: Denies rash, lesions  NEURO: Denies paresthesias, weakness  PSYCH: Denies depression, anxiety    VITALS:  T(F): 97.2, Max: 97.2 (05-06-24 @ 07:00)  HR: 51  BP: 137/76  RR: 17Vital Signs Last 24 Hrs  T(C): 36.2 (06 May 2024 07:00), Max: 36.2 (06 May 2024 07:00)  T(F): 97.2 (06 May 2024 07:00), Max: 97.2 (06 May 2024 07:00)  HR: 51 (06 May 2024 07:00) (51 - 61)  BP: 137/76 (06 May 2024 07:00) (131/54 - 137/76)  BP(mean): --  RR: 17 (06 May 2024 07:00) (16 - 17)  SpO2: 97% (06 May 2024 07:00) (95% - 97%)    Parameters below as of 06 May 2024 07:00  Patient On (Oxygen Delivery Method): room air        PHYSICAL EXAM:  Gen: NAD, resting in bed  HEENT: Normocephalic, atraumatic  Neck: supple, no lymphadenopathy  CV: Regular rate & regular rhythm  Lungs: decreased BS at bases, no fremitus  Abdomen: Soft, BS present  Ext: Warm, well perfused  Neuro: non focal, awake  Skin: no rash, no erythema  Lines: no phlebitis    FH: Non-contributory  Social Hx: Non-contributory    TESTS & MEASUREMENTS:                        12.8   8.10  )-----------( 314      ( 06 May 2024 07:21 )             37.7     05-06    140  |  106  |  18  ----------------------------<  105<H>  4.6   |  24  |  0.8    Ca    8.6      06 May 2024 07:21  Mg     2.2     05-06          Urinalysis Basic - ( 06 May 2024 07:21 )    Color: x / Appearance: x / SG: x / pH: x  Gluc: 105 mg/dL / Ketone: x  / Bili: x / Urobili: x   Blood: x / Protein: x / Nitrite: x   Leuk Esterase: x / RBC: x / WBC x   Sq Epi: x / Non Sq Epi: x / Bacteria: x        Culture - Abscess with Gram Stain (collected 05-02-24 @ 16:03)  Source: .Abscess Oral cavity  Gram Stain (05-03-24 @ 02:21):    Rare polymorphonuclear leukocytes seen per low power field    Rare Gram positive cocci in pairs seen per oil power field    Rare Yeast like cells seen per oil power field    Few Gram Negative Rods seen per oil power field  Preliminary Report (05-05-24 @ 09:40):    Moderate Candida glabrata "Susceptibilities not performed"    Normal mouth joanne isolated    Culture - Group A Streptococcus (collected 05-01-24 @ 23:12)  Source: .Throat Throat  Final Report (05-03-24 @ 16:53):    No Streptococcus pyogenes (Group A) isolated    Culture - Blood (collected 04-29-24 @ 06:35)  Source: .Blood None  Final Report (05-04-24 @ 14:01):    No growth at 5 days            INFECTIOUS DISEASES TESTING      INFLAMMATORY MARKERS  C-Reactive Protein: 107.9 mg/L (05-03-24 @ 06:34)  Sedimentation Rate, Erythrocyte: 88 mm/Hr (05-02-24 @ 07:17)  C-Reactive Protein: 132.7 mg/L (05-02-24 @ 07:17)  Sedimentation Rate, Erythrocyte: 73 mm/Hr (04-29-24 @ 06:35)      RADIOLOGY & ADDITIONAL TESTS:  I have personally reviewed the last available Chest xray  CXR      CT      CARDIOLOGY TESTING      MEDICATIONS  amLODIPine   Tablet 5 Oral daily  aspirin  chewable 81 Oral daily  atorvastatin 40 Oral at bedtime  cefTRIAXone   IVPB     cefTRIAXone   IVPB 2000 IV Intermittent every 24 hours  chlorhexidine 0.12% Liquid 15 Swish and Spit two times a day  clindamycin IVPB 900 IV Intermittent every 8 hours  enoxaparin Injectable 40 SubCutaneous every 24 hours  gabapentin 100 Oral three times a day  ibuprofen  Tablet. 600 Oral two times a day  levothyroxine 25 Oral daily  lidocaine   4% Patch 1 Transdermal every 24 hours  melatonin 5 Oral at bedtime  pantoprazole    Tablet 40 Oral before breakfast      WEIGHT  Weight (kg): 90.7 (04-28-24 @ 13:58)  Creatinine: 0.8 mg/dL (05-06-24 @ 07:21)      ANTIBIOTICS:  cefTRIAXone   IVPB      cefTRIAXone   IVPB 2000 milliGRAM(s) IV Intermittent every 24 hours  clindamycin IVPB 900 milliGRAM(s) IV Intermittent every 8 hours      All available historical records have been reviewed

## 2024-05-06 NOTE — PROGRESS NOTE ADULT - PROVIDER SPECIALTY LIST ADULT
ENT
Hospitalist
Infectious Disease
Infectious Disease
Internal Medicine
Hospitalist
Hospitalist
OMFS
ENT
Infectious Disease

## 2024-05-06 NOTE — DISCHARGE NOTE NURSING/CASE MANAGEMENT/SOCIAL WORK - NSDCPEFALRISK_GEN_ALL_CORE
For information on Fall & Injury Prevention, visit: [email or web address] OR  [email or web address] OR  https://www.cdc.gov/steadi/patient.html

## 2024-05-06 NOTE — PROGRESS NOTE ADULT - TIME BILLING
I have personally seen and examined this patient.  I have reviewed all pertinent clinical information and reviewed all relevant imaging and diagnostic studies personally.   If possible, I counseled the patient about diagnostic testing and treatment plan.   I discussed my recommendations with the primary team and any family members at bedside.
time spent on review of labs, imaging studies, old records, obtaining history, personally examining patient, counselling and communicating with patient, entering orders for medications/tests/etc, discussions with other health care providers, documentation in electronic health records, independent interpretation of labs, imaging/procedure results and care coordination.

## 2024-05-08 LAB
CULTURE RESULTS: ABNORMAL
SPECIMEN SOURCE: SIGNIFICANT CHANGE UP

## 2024-05-11 DIAGNOSIS — D72.829 ELEVATED WHITE BLOOD CELL COUNT, UNSPECIFIED: ICD-10-CM

## 2024-05-11 DIAGNOSIS — J02.0 STREPTOCOCCAL PHARYNGITIS: ICD-10-CM

## 2024-05-11 DIAGNOSIS — J43.9 EMPHYSEMA, UNSPECIFIED: ICD-10-CM

## 2024-05-11 DIAGNOSIS — M89.471: ICD-10-CM

## 2024-05-11 DIAGNOSIS — R74.01 ELEVATION OF LEVELS OF LIVER TRANSAMINASE LEVELS: ICD-10-CM

## 2024-05-11 DIAGNOSIS — Z96.643 PRESENCE OF ARTIFICIAL HIP JOINT, BILATERAL: ICD-10-CM

## 2024-05-11 DIAGNOSIS — L03.221 CELLULITIS OF NECK: ICD-10-CM

## 2024-05-11 DIAGNOSIS — Z88.0 ALLERGY STATUS TO PENICILLIN: ICD-10-CM

## 2024-05-11 DIAGNOSIS — M19.079 PRIMARY OSTEOARTHRITIS, UNSPECIFIED ANKLE AND FOOT: ICD-10-CM

## 2024-05-11 DIAGNOSIS — E78.5 HYPERLIPIDEMIA, UNSPECIFIED: ICD-10-CM

## 2024-05-11 DIAGNOSIS — K02.9 DENTAL CARIES, UNSPECIFIED: ICD-10-CM

## 2024-05-11 DIAGNOSIS — Z79.82 LONG TERM (CURRENT) USE OF ASPIRIN: ICD-10-CM

## 2024-05-11 DIAGNOSIS — I10 ESSENTIAL (PRIMARY) HYPERTENSION: ICD-10-CM

## 2024-05-11 DIAGNOSIS — E03.9 HYPOTHYROIDISM, UNSPECIFIED: ICD-10-CM

## 2024-05-11 DIAGNOSIS — K11.3 ABSCESS OF SALIVARY GLAND: ICD-10-CM

## 2024-05-13 ENCOUNTER — APPOINTMENT (OUTPATIENT)
Dept: PODIATRY | Facility: CLINIC | Age: 64
End: 2024-05-13
Payer: MEDICARE

## 2024-05-13 ENCOUNTER — OUTPATIENT (OUTPATIENT)
Dept: OUTPATIENT SERVICES | Facility: HOSPITAL | Age: 64
LOS: 1 days | End: 2024-05-13
Payer: MEDICARE

## 2024-05-13 DIAGNOSIS — Z00.00 ENCOUNTER FOR GENERAL ADULT MEDICAL EXAMINATION WITHOUT ABNORMAL FINDINGS: ICD-10-CM

## 2024-05-13 DIAGNOSIS — M19.079 PRIMARY OSTEOARTHRITIS, UNSPECIFIED ANKLE AND FOOT: ICD-10-CM

## 2024-05-13 DIAGNOSIS — Z98.890 OTHER SPECIFIED POSTPROCEDURAL STATES: Chronic | ICD-10-CM

## 2024-05-13 DIAGNOSIS — M79.671 PAIN IN RIGHT FOOT: ICD-10-CM

## 2024-05-13 DIAGNOSIS — Z96.643 PRESENCE OF ARTIFICIAL HIP JOINT, BILATERAL: Chronic | ICD-10-CM

## 2024-05-13 PROCEDURE — 20605 DRAIN/INJ JOINT/BURSA W/O US: CPT | Mod: RT

## 2024-05-13 PROCEDURE — 99213 OFFICE O/P EST LOW 20 MIN: CPT | Mod: 25

## 2024-05-13 RX ORDER — PREDNISONE 10 MG/1
10 TABLET ORAL
Qty: 22 | Refills: 0 | Status: ACTIVE | COMMUNITY
Start: 2024-05-13 | End: 1900-01-01

## 2024-05-20 ENCOUNTER — OUTPATIENT (OUTPATIENT)
Dept: OUTPATIENT SERVICES | Facility: HOSPITAL | Age: 64
LOS: 1 days | End: 2024-05-20
Payer: MEDICARE

## 2024-05-20 DIAGNOSIS — K08.409 PARTIAL LOSS OF TEETH, UNSPECIFIED CAUSE, UNSPECIFIED CLASS: ICD-10-CM

## 2024-05-20 DIAGNOSIS — Z96.643 PRESENCE OF ARTIFICIAL HIP JOINT, BILATERAL: Chronic | ICD-10-CM

## 2024-05-20 DIAGNOSIS — Z98.890 OTHER SPECIFIED POSTPROCEDURAL STATES: Chronic | ICD-10-CM

## 2024-05-20 PROBLEM — M19.079 ARTHRITIS OF FOOT: Status: ACTIVE | Noted: 2020-10-14

## 2024-05-20 PROBLEM — M79.671 RIGHT FOOT PAIN: Status: ACTIVE | Noted: 2020-01-14

## 2024-05-20 PROCEDURE — D0170: CPT

## 2024-05-20 NOTE — ASSESSMENT
[FreeTextEntry1] : Assessment: R foot dorsum joint pain, arthritis of foot  Plan: Pt seen and eval MRI reviewed- findings positive for extensive arthritis Injection of 1cc 1% lido and 1cc 1% celestone Rx PO prednisone F/u 1 month

## 2024-05-20 NOTE — PHYSICAL EXAM
[General Appearance - Alert] : alert [General Appearance - In No Acute Distress] : in no acute distress [General Appearance - Well Nourished] : well nourished [General Appearance - Well Developed] : well developed [1+] : left foot dorsalis pedis 1+ [Sensation] : the sensory exam was normal to light touch and pinprick [No Focal Deficits] : no focal deficits [de-identified] : R dorsum midfoot joint pain on palpation  [FreeTextEntry1] : No abnormality was found on R foot skin

## 2024-05-20 NOTE — PROCEDURE
[Right Foot] : was performed on the right foot [Therapeutic] : therapeutic [Consent Obtained] : Written consent was obtained prior to the procedure and is detailed in the patient's record [Patient] : Prior to the start of the procedure a time out was taken and the identity of the patient was confirmed via name and date of birth with the patient. The correct site and the procedure to be performed were confirmed. The correct side was confirmed if applicable. The availability of the correct equipment was verified [Ethyl Chloride] : ethyl chloride [___ ml Inj] : [unfilled] ~Uml [1%] : 1%  [Alcohol] : alcohol [25 gauge] : A 25 gauge needle was used [Betamethasone] : Betamethasone [Dexamethasone] : Dexamethasone [Tolerated Well] : tolerated the procedure well [Reports Improvement in Symptoms] : reports improvement in symptoms [No Complications] : There were no complications. [Instructions Given] : given handouts/patient instructions [Patient Instructed to Call] : instructed to call if redness at site, a decrease in range of motion or an increase in pain is noted after procedure. [de-identified] : 1 cc each

## 2024-05-20 NOTE — HISTORY OF PRESENT ILLNESS
[Sneakers] : preet [FreeTextEntry1] : Mr. Kendrick came back in a month for R foot dorsum pain due to arthritis that gets better w/ monthly injections. Was hospitalized for non-foot problem recently and foot pain flared up. MRI done

## 2024-05-24 DIAGNOSIS — M19.079 PRIMARY OSTEOARTHRITIS, UNSPECIFIED ANKLE AND FOOT: ICD-10-CM
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
## 2024-05-24 DIAGNOSIS — M79.671 PAIN IN RIGHT FOOT: ICD-10-CM

## 2024-05-24 DIAGNOSIS — Z01.21 ENCOUNTER FOR DENTAL EXAMINATION AND CLEANING WITH ABNORMAL FINDINGS: ICD-10-CM

## 2024-06-03 ENCOUNTER — OUTPATIENT (OUTPATIENT)
Dept: OUTPATIENT SERVICES | Facility: HOSPITAL | Age: 64
LOS: 1 days | End: 2024-06-03
Payer: MEDICAID

## 2024-06-03 DIAGNOSIS — K08.409 PARTIAL LOSS OF TEETH, UNSPECIFIED CAUSE, UNSPECIFIED CLASS: ICD-10-CM

## 2024-06-03 DIAGNOSIS — Z98.890 OTHER SPECIFIED POSTPROCEDURAL STATES: Chronic | ICD-10-CM

## 2024-06-03 DIAGNOSIS — Z96.643 PRESENCE OF ARTIFICIAL HIP JOINT, BILATERAL: Chronic | ICD-10-CM

## 2024-06-03 PROCEDURE — D7140: CPT

## 2024-06-17 ENCOUNTER — OUTPATIENT (OUTPATIENT)
Dept: OUTPATIENT SERVICES | Facility: HOSPITAL | Age: 64
LOS: 1 days | End: 2024-06-17
Payer: MEDICAID

## 2024-06-17 DIAGNOSIS — Z96.643 PRESENCE OF ARTIFICIAL HIP JOINT, BILATERAL: Chronic | ICD-10-CM

## 2024-06-17 DIAGNOSIS — Z98.890 OTHER SPECIFIED POSTPROCEDURAL STATES: Chronic | ICD-10-CM

## 2024-06-17 DIAGNOSIS — K08.409 PARTIAL LOSS OF TEETH, UNSPECIFIED CAUSE, UNSPECIFIED CLASS: ICD-10-CM

## 2024-06-17 DIAGNOSIS — K02.9 DENTAL CARIES, UNSPECIFIED: ICD-10-CM

## 2024-06-17 PROCEDURE — D5213: CPT

## 2024-06-18 ENCOUNTER — APPOINTMENT (OUTPATIENT)
Dept: PODIATRY | Facility: CLINIC | Age: 64
End: 2024-06-18
Payer: MEDICARE

## 2024-06-18 ENCOUNTER — OUTPATIENT (OUTPATIENT)
Dept: OUTPATIENT SERVICES | Facility: HOSPITAL | Age: 64
LOS: 1 days | End: 2024-06-18
Payer: MEDICARE

## 2024-06-18 DIAGNOSIS — Z98.890 OTHER SPECIFIED POSTPROCEDURAL STATES: Chronic | ICD-10-CM

## 2024-06-18 DIAGNOSIS — M19.071 PRIMARY OSTEOARTHRITIS, RIGHT ANKLE AND FOOT: ICD-10-CM

## 2024-06-18 DIAGNOSIS — Z00.00 ENCOUNTER FOR GENERAL ADULT MEDICAL EXAMINATION WITHOUT ABNORMAL FINDINGS: ICD-10-CM

## 2024-06-18 DIAGNOSIS — Z96.643 PRESENCE OF ARTIFICIAL HIP JOINT, BILATERAL: Chronic | ICD-10-CM

## 2024-06-18 PROCEDURE — 20605 DRAIN/INJ JOINT/BURSA W/O US: CPT

## 2024-06-18 PROCEDURE — 20605 DRAIN/INJ JOINT/BURSA W/O US: CPT | Mod: RT

## 2024-06-26 ENCOUNTER — NON-APPOINTMENT (OUTPATIENT)
Age: 64
End: 2024-06-26

## 2024-06-27 PROBLEM — M19.071 OSTEOARTHRITIS OF RIGHT ANKLE OR FOOT: Status: ACTIVE | Noted: 2018-08-22

## 2024-06-28 DIAGNOSIS — M79.671 PAIN IN RIGHT FOOT: ICD-10-CM
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
## 2024-06-28 DIAGNOSIS — M19.071 PRIMARY OSTEOARTHRITIS, RIGHT ANKLE AND FOOT: ICD-10-CM

## 2024-06-28 DIAGNOSIS — M25.579 PAIN IN UNSPECIFIED ANKLE AND JOINTS OF UNSPECIFIED FOOT: ICD-10-CM
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
## 2024-07-01 ENCOUNTER — OUTPATIENT (OUTPATIENT)
Dept: OUTPATIENT SERVICES | Facility: HOSPITAL | Age: 64
LOS: 1 days | End: 2024-07-01
Payer: MEDICAID

## 2024-07-01 DIAGNOSIS — K08.409 PARTIAL LOSS OF TEETH, UNSPECIFIED CAUSE, UNSPECIFIED CLASS: ICD-10-CM

## 2024-07-01 DIAGNOSIS — Z96.643 PRESENCE OF ARTIFICIAL HIP JOINT, BILATERAL: Chronic | ICD-10-CM

## 2024-07-01 DIAGNOSIS — Z98.890 OTHER SPECIFIED POSTPROCEDURAL STATES: Chronic | ICD-10-CM

## 2024-07-01 PROCEDURE — D5214: CPT

## 2024-07-09 ENCOUNTER — NON-APPOINTMENT (OUTPATIENT)
Age: 64
End: 2024-07-09

## 2024-07-18 ENCOUNTER — OUTPATIENT (OUTPATIENT)
Dept: OUTPATIENT SERVICES | Facility: HOSPITAL | Age: 64
LOS: 1 days | End: 2024-07-18
Payer: MEDICARE

## 2024-07-18 ENCOUNTER — APPOINTMENT (OUTPATIENT)
Dept: PODIATRY | Facility: CLINIC | Age: 64
End: 2024-07-18

## 2024-07-18 DIAGNOSIS — M19.071 PRIMARY OSTEOARTHRITIS, RIGHT ANKLE AND FOOT: ICD-10-CM

## 2024-07-18 DIAGNOSIS — Z96.643 PRESENCE OF ARTIFICIAL HIP JOINT, BILATERAL: Chronic | ICD-10-CM

## 2024-07-18 DIAGNOSIS — Z00.00 ENCOUNTER FOR GENERAL ADULT MEDICAL EXAMINATION WITHOUT ABNORMAL FINDINGS: ICD-10-CM

## 2024-07-18 PROCEDURE — 20605 DRAIN/INJ JOINT/BURSA W/O US: CPT | Mod: RT

## 2024-07-29 DIAGNOSIS — M25.571 PAIN IN RIGHT ANKLE AND JOINTS OF RIGHT FOOT: ICD-10-CM
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
## 2024-07-29 DIAGNOSIS — M19.071 PRIMARY OSTEOARTHRITIS, RIGHT ANKLE AND FOOT: ICD-10-CM

## 2024-07-29 DIAGNOSIS — M79.671 PAIN IN RIGHT FOOT: ICD-10-CM

## 2024-08-05 ENCOUNTER — OUTPATIENT (OUTPATIENT)
Dept: OUTPATIENT SERVICES | Facility: HOSPITAL | Age: 64
LOS: 1 days | End: 2024-08-05
Payer: MEDICAID

## 2024-08-05 DIAGNOSIS — K08.409 PARTIAL LOSS OF TEETH, UNSPECIFIED CAUSE, UNSPECIFIED CLASS: ICD-10-CM

## 2024-08-05 DIAGNOSIS — Z98.890 OTHER SPECIFIED POSTPROCEDURAL STATES: Chronic | ICD-10-CM

## 2024-08-05 DIAGNOSIS — Z96.643 PRESENCE OF ARTIFICIAL HIP JOINT, BILATERAL: Chronic | ICD-10-CM

## 2024-08-05 PROCEDURE — D5214: CPT

## 2024-08-19 ENCOUNTER — OUTPATIENT (OUTPATIENT)
Dept: OUTPATIENT SERVICES | Facility: HOSPITAL | Age: 64
LOS: 1 days | End: 2024-08-19
Payer: MEDICAID

## 2024-08-19 DIAGNOSIS — K08.409 PARTIAL LOSS OF TEETH, UNSPECIFIED CAUSE, UNSPECIFIED CLASS: ICD-10-CM

## 2024-08-19 DIAGNOSIS — Z96.643 PRESENCE OF ARTIFICIAL HIP JOINT, BILATERAL: Chronic | ICD-10-CM

## 2024-08-19 DIAGNOSIS — Z98.890 OTHER SPECIFIED POSTPROCEDURAL STATES: Chronic | ICD-10-CM

## 2024-08-19 PROCEDURE — D5212: CPT

## 2024-08-22 DIAGNOSIS — K08.409 PARTIAL LOSS OF TEETH, UNSPECIFIED CAUSE, UNSPECIFIED CLASS: ICD-10-CM

## 2024-08-30 ENCOUNTER — OUTPATIENT (OUTPATIENT)
Dept: OUTPATIENT SERVICES | Facility: HOSPITAL | Age: 64
LOS: 1 days | End: 2024-08-30
Payer: MEDICAID

## 2024-08-30 DIAGNOSIS — Z96.643 PRESENCE OF ARTIFICIAL HIP JOINT, BILATERAL: Chronic | ICD-10-CM

## 2024-08-30 DIAGNOSIS — Z00.00 ENCOUNTER FOR GENERAL ADULT MEDICAL EXAMINATION WITHOUT ABNORMAL FINDINGS: ICD-10-CM

## 2024-08-30 DIAGNOSIS — Z98.890 OTHER SPECIFIED POSTPROCEDURAL STATES: Chronic | ICD-10-CM

## 2024-08-30 PROCEDURE — 84443 ASSAY THYROID STIM HORMONE: CPT

## 2024-08-30 PROCEDURE — 80061 LIPID PANEL: CPT

## 2024-08-30 PROCEDURE — 80053 COMPREHEN METABOLIC PANEL: CPT

## 2024-08-30 PROCEDURE — 85027 COMPLETE CBC AUTOMATED: CPT

## 2024-08-30 PROCEDURE — 83036 HEMOGLOBIN GLYCOSYLATED A1C: CPT

## 2024-08-31 DIAGNOSIS — Z00.00 ENCOUNTER FOR GENERAL ADULT MEDICAL EXAMINATION WITHOUT ABNORMAL FINDINGS: ICD-10-CM

## 2024-09-03 ENCOUNTER — APPOINTMENT (OUTPATIENT)
Dept: PODIATRY | Facility: CLINIC | Age: 64
End: 2024-09-03

## 2024-09-03 ENCOUNTER — OUTPATIENT (OUTPATIENT)
Dept: OUTPATIENT SERVICES | Facility: HOSPITAL | Age: 64
LOS: 1 days | End: 2024-09-03
Payer: MEDICARE

## 2024-09-03 DIAGNOSIS — M19.071 PRIMARY OSTEOARTHRITIS, RIGHT ANKLE AND FOOT: ICD-10-CM

## 2024-09-03 DIAGNOSIS — Z96.643 PRESENCE OF ARTIFICIAL HIP JOINT, BILATERAL: Chronic | ICD-10-CM
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
## 2024-09-03 DIAGNOSIS — Z00.00 ENCOUNTER FOR GENERAL ADULT MEDICAL EXAMINATION WITHOUT ABNORMAL FINDINGS: ICD-10-CM

## 2024-09-03 DIAGNOSIS — Z98.890 OTHER SPECIFIED POSTPROCEDURAL STATES: Chronic | ICD-10-CM

## 2024-09-03 DIAGNOSIS — M79.671 PAIN IN RIGHT FOOT: ICD-10-CM
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
## 2024-09-03 DIAGNOSIS — M19.079 PRIMARY OSTEOARTHRITIS, UNSPECIFIED ANKLE AND FOOT: ICD-10-CM

## 2024-09-03 PROCEDURE — 20605 DRAIN/INJ JOINT/BURSA W/O US: CPT | Mod: RT

## 2024-09-03 PROCEDURE — 20605 DRAIN/INJ JOINT/BURSA W/O US: CPT

## 2024-09-03 NOTE — PHYSICAL EXAM
[General Appearance - Alert] : alert [General Appearance - In No Acute Distress] : in no acute distress [General Appearance - Well Nourished] : well nourished [General Appearance - Well Developed] : well developed [1+] : left foot dorsalis pedis 1+ [de-identified] : R dorsum midfoot joint pain on palpation  [FreeTextEntry1] : No abnormality was found on R foot skin [Sensation] : the sensory exam was normal to light touch and pinprick [No Focal Deficits] : no focal deficits

## 2024-09-03 NOTE — HISTORY OF PRESENT ILLNESS
[Sneakers] : preet [FreeTextEntry1] : Mr. Kendrick came back in a month for R foot dorsum pain due to arthritis that gets better w/ monthly injections. Was hospitalized for non-foot problem recently and foot pain flared up.   9/3 - RTC for continued arthritc pain of right foot.

## 2024-09-03 NOTE — ASSESSMENT
[FreeTextEntry1] : Assessment: R foot dorsum joint pain, arthritis of foot  Plan: Pt seen and eval Injection of 1cc 1% lido and 1cc 1% celestone F/u 1 month

## 2024-09-03 NOTE — PROCEDURE
[Right Foot] : was performed on the right foot [Therapeutic] : therapeutic [Consent Obtained] : Written consent was obtained prior to the procedure and is detailed in the patient's record [Patient] : Prior to the start of the procedure a time out was taken and the identity of the patient was confirmed via name and date of birth with the patient. The correct site and the procedure to be performed were confirmed. The correct side was confirmed if applicable. The availability of the correct equipment was verified [Ethyl Chloride] : ethyl chloride [___ ml Inj] : [unfilled] ~Uml [1%] : 1%  [Alcohol] : alcohol [25 gauge] : A 25 gauge needle was used [Betamethasone] : Betamethasone [Dexamethasone] : Dexamethasone [Tolerated Well] : tolerated the procedure well [Reports Improvement in Symptoms] : reports improvement in symptoms [No Complications] : There were no complications. [Instructions Given] : given handouts/patient instructions [Patient Instructed to Call] : instructed to call if redness at site, a decrease in range of motion or an increase in pain is noted after procedure. [de-identified] : 1 cc each

## 2024-09-11 ENCOUNTER — OUTPATIENT (OUTPATIENT)
Dept: OUTPATIENT SERVICES | Facility: HOSPITAL | Age: 64
LOS: 1 days | End: 2024-09-11
Payer: MEDICARE

## 2024-09-11 ENCOUNTER — APPOINTMENT (OUTPATIENT)
Dept: INTERNAL MEDICINE | Facility: CLINIC | Age: 64
End: 2024-09-11
Payer: MEDICARE

## 2024-09-11 VITALS — SYSTOLIC BLOOD PRESSURE: 148 MMHG | DIASTOLIC BLOOD PRESSURE: 91 MMHG

## 2024-09-11 VITALS
WEIGHT: 200 LBS | TEMPERATURE: 97.8 F | OXYGEN SATURATION: 98 % | SYSTOLIC BLOOD PRESSURE: 147 MMHG | HEART RATE: 60 BPM | BODY MASS INDEX: 27.09 KG/M2 | HEIGHT: 72 IN | DIASTOLIC BLOOD PRESSURE: 85 MMHG

## 2024-09-11 DIAGNOSIS — Z00.00 ENCOUNTER FOR GENERAL ADULT MEDICAL EXAMINATION WITHOUT ABNORMAL FINDINGS: ICD-10-CM

## 2024-09-11 DIAGNOSIS — I10 ESSENTIAL (PRIMARY) HYPERTENSION: ICD-10-CM

## 2024-09-11 DIAGNOSIS — Z00.00 ENCOUNTER FOR GENERAL ADULT MEDICAL EXAMINATION W/OUT ABNORMAL FINDINGS: ICD-10-CM

## 2024-09-11 DIAGNOSIS — K59.00 CONSTIPATION, UNSPECIFIED: ICD-10-CM

## 2024-09-11 DIAGNOSIS — E78.5 HYPERLIPIDEMIA, UNSPECIFIED: ICD-10-CM

## 2024-09-11 DIAGNOSIS — L72.3 SEBACEOUS CYST: ICD-10-CM

## 2024-09-11 PROCEDURE — 90656 IIV3 VACC NO PRSV 0.5 ML IM: CPT

## 2024-09-11 PROCEDURE — G2211 COMPLEX E/M VISIT ADD ON: CPT

## 2024-09-11 PROCEDURE — 99214 OFFICE O/P EST MOD 30 MIN: CPT

## 2024-09-11 PROCEDURE — 90471 IMMUNIZATION ADMIN: CPT

## 2024-09-11 PROCEDURE — 99214 OFFICE O/P EST MOD 30 MIN: CPT | Mod: 25

## 2024-09-11 RX ORDER — SENNOSIDES 8.6 MG/1
8.6 CAPSULE, GELATIN COATED ORAL AT BEDTIME
Qty: 30 | Refills: 2 | Status: ACTIVE | COMMUNITY
Start: 2024-09-11 | End: 1900-01-01

## 2024-09-11 NOTE — PHYSICAL EXAM
[No Acute Distress] : no acute distress [Well Nourished] : well nourished [Well Developed] : well developed [No Respiratory Distress] : no respiratory distress  [Normal Rate] : normal rate  [Regular Rhythm] : with a regular rhythm [Normal S1, S2] : normal S1 and S2 [No Edema] : there was no peripheral edema [Soft] : abdomen soft [Non Tender] : non-tender [Non-distended] : non-distended

## 2024-09-16 NOTE — HISTORY OF PRESENT ILLNESS
[FreeTextEntry1] : routine follow up [de-identified] : 63 y/o M PMHx HTN, DLD, pulmonary nodules, thyroid mass s/p partial hemithyroidectomy, osteoarthritis and chronic low back pain/sciatica presenting for routine follow up.

## 2024-09-16 NOTE — REVIEW OF SYSTEMS
[Constipation] : constipation [Fever] : no fever [Chills] : no chills [Recent Change In Weight] : ~T no recent weight change [Chest Pain] : no chest pain [Lower Ext Edema] : no lower extremity edema [Shortness Of Breath] : no shortness of breath [Cough] : no cough [Dyspnea on Exertion] : not dyspnea on exertion [Abdominal Pain] : no abdominal pain [Diarrhea] : no diarrhea [Joint Pain] : no joint pain [Skin Rash] : no skin rash

## 2024-09-16 NOTE — HISTORY OF PRESENT ILLNESS
[FreeTextEntry1] : routine follow up [de-identified] : 63 y/o M PMHx HTN, DLD, pulmonary nodules, thyroid mass s/p partial hemithyroidectomy, osteoarthritis and chronic low back pain/sciatica presenting for routine follow up.

## 2024-09-16 NOTE — ASSESSMENT
[FreeTextEntry1] : #sebaceous cyst of the mid chest wall: - doesn't look infected - needs bedside drainage - recommended dermatology referral  #chronic constipation: - c/w OTC laxatives prn  # Lung nodules - CT chest yearly; March 2024:  - repeat CT chest in March 2025  # Sciatica/chronic back pain- controlled - c/w gabapentin, meloxicam and diclofenac gel  # Thyroid mass s/p hemithyroidectomy, pathology positive for hyperplasia/ no tumor seen - Follows w/ Dr. Yanez, last appt in June no changes to medications were made - C/w Levothyroxine 25mcg OD  # CAD # HTN - Blood pressure elevated for past 3 visit - increase Amlodipine to 10mg OD  - C/w aspirin 81mg  # H/O DLD - C/w rosuvastatin 10mg  # Pre DM - stable - c/w with consistent carbohydrate diet and lifestyle modifications  #Nasal congestion - c/w fluticasone   # HCM - Last colonoscopy done in Feb 2023: repeat in Feb2026 - Tdap 2015 - Flu shot given at this visit - Pneumococcal vaccine at 65 - pt reports light smoking of few cigarettes per day; advised cessation  -RTC in 3 months with BP log sheet

## 2024-09-17 DIAGNOSIS — E78.5 HYPERLIPIDEMIA, UNSPECIFIED: ICD-10-CM

## 2024-09-17 DIAGNOSIS — Z23 ENCOUNTER FOR IMMUNIZATION: ICD-10-CM

## 2024-09-17 DIAGNOSIS — K59.00 CONSTIPATION, UNSPECIFIED: ICD-10-CM

## 2024-09-17 DIAGNOSIS — I10 ESSENTIAL (PRIMARY) HYPERTENSION: ICD-10-CM

## 2024-09-17 DIAGNOSIS — L72.3 SEBACEOUS CYST: ICD-10-CM

## 2024-09-17 DIAGNOSIS — Z00.00 ENCOUNTER FOR GENERAL ADULT MEDICAL EXAMINATION WITHOUT ABNORMAL FINDINGS: ICD-10-CM

## 2024-09-19 ENCOUNTER — OUTPATIENT (OUTPATIENT)
Dept: OUTPATIENT SERVICES | Facility: HOSPITAL | Age: 64
LOS: 1 days | End: 2024-09-19
Payer: MEDICAID

## 2024-09-19 DIAGNOSIS — Z96.643 PRESENCE OF ARTIFICIAL HIP JOINT, BILATERAL: Chronic | ICD-10-CM

## 2024-09-19 DIAGNOSIS — Z98.890 OTHER SPECIFIED POSTPROCEDURAL STATES: Chronic | ICD-10-CM

## 2024-09-19 PROCEDURE — D1110: CPT

## 2024-09-19 PROCEDURE — D0230: CPT

## 2024-09-19 PROCEDURE — D0120: CPT

## 2024-09-20 ENCOUNTER — OUTPATIENT (OUTPATIENT)
Dept: OUTPATIENT SERVICES | Facility: HOSPITAL | Age: 64
LOS: 1 days | End: 2024-09-20
Payer: MEDICAID

## 2024-09-20 DIAGNOSIS — Z98.890 OTHER SPECIFIED POSTPROCEDURAL STATES: Chronic | ICD-10-CM

## 2024-09-20 DIAGNOSIS — K02.52 DENTAL CARIES ON PIT AND FISSURE SURFACE PENETRATING INTO DENTIN: ICD-10-CM

## 2024-09-20 DIAGNOSIS — Z96.643 PRESENCE OF ARTIFICIAL HIP JOINT, BILATERAL: Chronic | ICD-10-CM

## 2024-09-20 PROCEDURE — D2332: CPT

## 2024-09-20 PROCEDURE — D0220: CPT

## 2024-09-23 ENCOUNTER — APPOINTMENT (OUTPATIENT)
Dept: SURGERY | Facility: CLINIC | Age: 64
End: 2024-09-23

## 2024-09-26 DIAGNOSIS — K02.9 DENTAL CARIES, UNSPECIFIED: ICD-10-CM

## 2024-10-01 ENCOUNTER — APPOINTMENT (OUTPATIENT)
Dept: PODIATRY | Facility: CLINIC | Age: 64
End: 2024-10-01
Payer: MEDICARE

## 2024-10-01 ENCOUNTER — OUTPATIENT (OUTPATIENT)
Dept: OUTPATIENT SERVICES | Facility: HOSPITAL | Age: 64
LOS: 1 days | End: 2024-10-01
Payer: MEDICARE

## 2024-10-01 DIAGNOSIS — M79.671 PAIN IN RIGHT FOOT: ICD-10-CM

## 2024-10-01 DIAGNOSIS — Z00.00 ENCOUNTER FOR GENERAL ADULT MEDICAL EXAMINATION WITHOUT ABNORMAL FINDINGS: ICD-10-CM

## 2024-10-01 DIAGNOSIS — M25.571 PAIN IN RIGHT ANKLE AND JOINTS OF RIGHT FOOT: ICD-10-CM

## 2024-10-01 DIAGNOSIS — M19.079 PRIMARY OSTEOARTHRITIS, UNSPECIFIED ANKLE AND FOOT: ICD-10-CM

## 2024-10-01 PROCEDURE — 20605 DRAIN/INJ JOINT/BURSA W/O US: CPT | Mod: RT

## 2024-10-02 ENCOUNTER — NON-APPOINTMENT (OUTPATIENT)
Age: 64
End: 2024-10-02

## 2024-10-02 NOTE — PROCEDURE
[Right Foot] : was performed on the right foot [Therapeutic] : therapeutic [Consent Obtained] : Written consent was obtained prior to the procedure and is detailed in the patient's record [Patient] : Prior to the start of the procedure a time out was taken and the identity of the patient was confirmed via name and date of birth with the patient. The correct site and the procedure to be performed were confirmed. The correct side was confirmed if applicable. The availability of the correct equipment was verified [Ethyl Chloride] : ethyl chloride [___ ml Inj] : [unfilled] ~Uml [1%] : 1%  [Alcohol] : alcohol [25 gauge] : A 25 gauge needle was used [Betamethasone] : Betamethasone [Dexamethasone] : Dexamethasone [Tolerated Well] : tolerated the procedure well [Reports Improvement in Symptoms] : reports improvement in symptoms [No Complications] : There were no complications. [Instructions Given] : given handouts/patient instructions [Patient Instructed to Call] : instructed to call if redness at site, a decrease in range of motion or an increase in pain is noted after procedure. [de-identified] : 1 cc each

## 2024-10-02 NOTE — PHYSICAL EXAM
[General Appearance - Alert] : alert [General Appearance - In No Acute Distress] : in no acute distress [General Appearance - Well Nourished] : well nourished [General Appearance - Well Developed] : well developed [1+] : left foot dorsalis pedis 1+ [de-identified] : R dorsum midfoot joint pain on palpation  [FreeTextEntry1] : No abnormality was found on R foot skin [Sensation] : the sensory exam was normal to light touch and pinprick [No Focal Deficits] : no focal deficits

## 2024-10-04 DIAGNOSIS — Z01.21 ENCOUNTER FOR DENTAL EXAMINATION AND CLEANING WITH ABNORMAL FINDINGS: ICD-10-CM

## 2024-10-10 DIAGNOSIS — M25.571 PAIN IN RIGHT ANKLE AND JOINTS OF RIGHT FOOT: ICD-10-CM

## 2024-10-10 DIAGNOSIS — M19.079 PRIMARY OSTEOARTHRITIS, UNSPECIFIED ANKLE AND FOOT: ICD-10-CM
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
## 2024-10-10 DIAGNOSIS — M79.671 PAIN IN RIGHT FOOT: ICD-10-CM

## 2024-11-02 ENCOUNTER — RX RENEWAL (OUTPATIENT)
Age: 64
End: 2024-11-02

## 2024-11-05 ENCOUNTER — OUTPATIENT (OUTPATIENT)
Dept: OUTPATIENT SERVICES | Facility: HOSPITAL | Age: 64
LOS: 1 days | End: 2024-11-05
Payer: MEDICARE

## 2024-11-05 ENCOUNTER — APPOINTMENT (OUTPATIENT)
Dept: PODIATRY | Facility: CLINIC | Age: 64
End: 2024-11-05

## 2024-11-05 DIAGNOSIS — M25.571 PAIN IN RIGHT ANKLE AND JOINTS OF RIGHT FOOT: ICD-10-CM

## 2024-11-05 DIAGNOSIS — Z00.00 ENCOUNTER FOR GENERAL ADULT MEDICAL EXAMINATION WITHOUT ABNORMAL FINDINGS: ICD-10-CM

## 2024-11-05 DIAGNOSIS — M79.671 PAIN IN RIGHT FOOT: ICD-10-CM

## 2024-11-05 DIAGNOSIS — Z98.890 OTHER SPECIFIED POSTPROCEDURAL STATES: Chronic | ICD-10-CM
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
## 2024-11-05 DIAGNOSIS — Z96.643 PRESENCE OF ARTIFICIAL HIP JOINT, BILATERAL: Chronic | ICD-10-CM

## 2024-11-05 DIAGNOSIS — M71.9 BURSOPATHY, UNSPECIFIED: ICD-10-CM
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
## 2024-11-05 PROCEDURE — 20605 DRAIN/INJ JOINT/BURSA W/O US: CPT | Mod: RT

## 2024-11-05 PROCEDURE — 20605 DRAIN/INJ JOINT/BURSA W/O US: CPT

## 2024-11-27 NOTE — ED ADULT NURSE NOTE - CHIEF COMPLAINT
The patient is a 62y Male complaining of chest discomfort. The patient is a 55y Male complaining of abdominal pain.

## 2024-12-03 ENCOUNTER — APPOINTMENT (OUTPATIENT)
Dept: PODIATRY | Facility: CLINIC | Age: 64
End: 2024-12-03

## 2024-12-11 ENCOUNTER — OUTPATIENT (OUTPATIENT)
Dept: OUTPATIENT SERVICES | Facility: HOSPITAL | Age: 64
LOS: 1 days | End: 2024-12-11
Payer: MEDICARE

## 2024-12-11 ENCOUNTER — APPOINTMENT (OUTPATIENT)
Dept: INTERNAL MEDICINE | Facility: CLINIC | Age: 64
End: 2024-12-11
Payer: MEDICARE

## 2024-12-11 VITALS
DIASTOLIC BLOOD PRESSURE: 82 MMHG | TEMPERATURE: 97.3 F | HEART RATE: 63 BPM | OXYGEN SATURATION: 98 % | SYSTOLIC BLOOD PRESSURE: 131 MMHG | HEIGHT: 72 IN | BODY MASS INDEX: 26.28 KG/M2 | WEIGHT: 194 LBS

## 2024-12-11 DIAGNOSIS — E03.9 HYPOTHYROIDISM, UNSPECIFIED: ICD-10-CM

## 2024-12-11 DIAGNOSIS — I25.10 ATHEROSCLEROTIC HEART DISEASE OF NATIVE CORONARY ARTERY W/OUT ANGINA PECTORIS: ICD-10-CM

## 2024-12-11 DIAGNOSIS — R73.03 PREDIABETES.: ICD-10-CM

## 2024-12-11 DIAGNOSIS — I10 ESSENTIAL (PRIMARY) HYPERTENSION: ICD-10-CM

## 2024-12-11 DIAGNOSIS — Z96.643 PRESENCE OF ARTIFICIAL HIP JOINT, BILATERAL: Chronic | ICD-10-CM

## 2024-12-11 DIAGNOSIS — Z00.00 ENCOUNTER FOR GENERAL ADULT MEDICAL EXAMINATION WITHOUT ABNORMAL FINDINGS: ICD-10-CM

## 2024-12-11 DIAGNOSIS — R91.1 SOLITARY PULMONARY NODULE: ICD-10-CM

## 2024-12-11 DIAGNOSIS — Z00.00 ENCOUNTER FOR GENERAL ADULT MEDICAL EXAMINATION W/OUT ABNORMAL FINDINGS: ICD-10-CM

## 2024-12-11 DIAGNOSIS — E78.5 HYPERLIPIDEMIA, UNSPECIFIED: ICD-10-CM

## 2024-12-11 DIAGNOSIS — Z98.890 OTHER SPECIFIED POSTPROCEDURAL STATES: Chronic | ICD-10-CM

## 2024-12-11 PROCEDURE — G2211 COMPLEX E/M VISIT ADD ON: CPT

## 2024-12-11 PROCEDURE — 99214 OFFICE O/P EST MOD 30 MIN: CPT

## 2024-12-18 DIAGNOSIS — R91.1 SOLITARY PULMONARY NODULE: ICD-10-CM

## 2024-12-18 DIAGNOSIS — Z00.00 ENCOUNTER FOR GENERAL ADULT MEDICAL EXAMINATION WITHOUT ABNORMAL FINDINGS: ICD-10-CM

## 2024-12-18 DIAGNOSIS — R73.03 PREDIABETES: ICD-10-CM

## 2024-12-18 DIAGNOSIS — I10 ESSENTIAL (PRIMARY) HYPERTENSION: ICD-10-CM

## 2024-12-18 DIAGNOSIS — I25.10 ATHEROSCLEROTIC HEART DISEASE OF NATIVE CORONARY ARTERY WITHOUT ANGINA PECTORIS: ICD-10-CM

## 2024-12-18 DIAGNOSIS — E03.9 HYPOTHYROIDISM, UNSPECIFIED: ICD-10-CM

## 2024-12-18 DIAGNOSIS — E78.5 HYPERLIPIDEMIA, UNSPECIFIED: ICD-10-CM

## 2025-01-06 ENCOUNTER — NON-APPOINTMENT (OUTPATIENT)
Age: 65
End: 2025-01-06

## 2025-03-21 ENCOUNTER — OUTPATIENT (OUTPATIENT)
Dept: OUTPATIENT SERVICES | Facility: HOSPITAL | Age: 65
LOS: 1 days | End: 2025-03-21
Payer: MEDICAID

## 2025-03-21 DIAGNOSIS — Z98.890 OTHER SPECIFIED POSTPROCEDURAL STATES: Chronic | ICD-10-CM

## 2025-03-21 DIAGNOSIS — Z01.20 ENCOUNTER FOR DENTAL EXAMINATION AND CLEANING WITHOUT ABNORMAL FINDINGS: ICD-10-CM

## 2025-03-21 DIAGNOSIS — Z96.643 PRESENCE OF ARTIFICIAL HIP JOINT, BILATERAL: Chronic | ICD-10-CM

## 2025-03-21 PROCEDURE — D1110: CPT

## 2025-03-24 DIAGNOSIS — Z01.21 ENCOUNTER FOR DENTAL EXAMINATION AND CLEANING WITH ABNORMAL FINDINGS: ICD-10-CM

## 2025-03-27 ENCOUNTER — OUTPATIENT (OUTPATIENT)
Dept: OUTPATIENT SERVICES | Facility: HOSPITAL | Age: 65
LOS: 1 days | End: 2025-03-27
Payer: MEDICAID

## 2025-03-27 DIAGNOSIS — K02.52 DENTAL CARIES ON PIT AND FISSURE SURFACE PENETRATING INTO DENTIN: ICD-10-CM

## 2025-03-27 DIAGNOSIS — Z98.890 OTHER SPECIFIED POSTPROCEDURAL STATES: Chronic | ICD-10-CM

## 2025-03-27 DIAGNOSIS — Z96.643 PRESENCE OF ARTIFICIAL HIP JOINT, BILATERAL: Chronic | ICD-10-CM

## 2025-03-27 PROCEDURE — D1354: CPT

## 2025-03-28 DIAGNOSIS — K02.63 DENTAL CARIES ON SMOOTH SURFACE PENETRATING INTO PULP: ICD-10-CM

## 2025-04-11 ENCOUNTER — OUTPATIENT (OUTPATIENT)
Dept: OUTPATIENT SERVICES | Facility: HOSPITAL | Age: 65
LOS: 1 days | End: 2025-04-11
Payer: MEDICAID

## 2025-04-11 DIAGNOSIS — Z98.890 OTHER SPECIFIED POSTPROCEDURAL STATES: Chronic | ICD-10-CM

## 2025-04-11 DIAGNOSIS — K02.52 DENTAL CARIES ON PIT AND FISSURE SURFACE PENETRATING INTO DENTIN: ICD-10-CM

## 2025-04-11 DIAGNOSIS — Z96.643 PRESENCE OF ARTIFICIAL HIP JOINT, BILATERAL: Chronic | ICD-10-CM

## 2025-04-11 PROCEDURE — D0220: CPT

## 2025-04-11 PROCEDURE — D0170: CPT

## 2025-04-16 DIAGNOSIS — K02.9 DENTAL CARIES, UNSPECIFIED: ICD-10-CM

## 2025-04-17 ENCOUNTER — OUTPATIENT (OUTPATIENT)
Dept: OUTPATIENT SERVICES | Facility: HOSPITAL | Age: 65
LOS: 1 days | End: 2025-04-17
Payer: MEDICAID

## 2025-04-17 ENCOUNTER — APPOINTMENT (OUTPATIENT)
Dept: PODIATRY | Facility: CLINIC | Age: 65
End: 2025-04-17

## 2025-04-17 DIAGNOSIS — Z00.00 ENCOUNTER FOR GENERAL ADULT MEDICAL EXAMINATION WITHOUT ABNORMAL FINDINGS: ICD-10-CM

## 2025-04-17 DIAGNOSIS — Z98.890 OTHER SPECIFIED POSTPROCEDURAL STATES: Chronic | ICD-10-CM

## 2025-04-17 DIAGNOSIS — Z96.643 PRESENCE OF ARTIFICIAL HIP JOINT, BILATERAL: Chronic | ICD-10-CM

## 2025-04-17 PROCEDURE — 20605 DRAIN/INJ JOINT/BURSA W/O US: CPT | Mod: RT

## 2025-05-01 ENCOUNTER — OUTPATIENT (OUTPATIENT)
Dept: OUTPATIENT SERVICES | Facility: HOSPITAL | Age: 65
LOS: 1 days | End: 2025-05-01
Payer: MEDICAID

## 2025-05-01 DIAGNOSIS — Z96.643 PRESENCE OF ARTIFICIAL HIP JOINT, BILATERAL: Chronic | ICD-10-CM

## 2025-05-01 DIAGNOSIS — M79.671 PAIN IN RIGHT FOOT: ICD-10-CM
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
## 2025-05-01 DIAGNOSIS — K01.1 IMPACTED TEETH: ICD-10-CM

## 2025-05-01 DIAGNOSIS — M25.571 PAIN IN RIGHT ANKLE AND JOINTS OF RIGHT FOOT: ICD-10-CM

## 2025-05-01 DIAGNOSIS — Z98.890 OTHER SPECIFIED POSTPROCEDURAL STATES: Chronic | ICD-10-CM

## 2025-05-01 DIAGNOSIS — M19.071 PRIMARY OSTEOARTHRITIS, RIGHT ANKLE AND FOOT: ICD-10-CM
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
## 2025-05-01 PROCEDURE — D7140: CPT

## 2025-05-15 ENCOUNTER — APPOINTMENT (OUTPATIENT)
Dept: PODIATRY | Facility: CLINIC | Age: 65
End: 2025-05-15

## 2025-05-15 ENCOUNTER — OUTPATIENT (OUTPATIENT)
Dept: OUTPATIENT SERVICES | Facility: HOSPITAL | Age: 65
LOS: 1 days | End: 2025-05-15
Payer: MEDICAID

## 2025-05-15 DIAGNOSIS — Z98.890 OTHER SPECIFIED POSTPROCEDURAL STATES: Chronic | ICD-10-CM

## 2025-05-15 DIAGNOSIS — Z00.00 ENCOUNTER FOR GENERAL ADULT MEDICAL EXAMINATION WITHOUT ABNORMAL FINDINGS: ICD-10-CM

## 2025-05-15 DIAGNOSIS — Z96.643 PRESENCE OF ARTIFICIAL HIP JOINT, BILATERAL: Chronic | ICD-10-CM

## 2025-05-15 PROCEDURE — 20605 DRAIN/INJ JOINT/BURSA W/O US: CPT

## 2025-05-15 PROCEDURE — 20605 DRAIN/INJ JOINT/BURSA W/O US: CPT | Mod: RT

## 2025-05-21 ENCOUNTER — NON-APPOINTMENT (OUTPATIENT)
Age: 65
End: 2025-05-21

## 2025-05-22 ENCOUNTER — OUTPATIENT (OUTPATIENT)
Dept: OUTPATIENT SERVICES | Facility: HOSPITAL | Age: 65
LOS: 1 days | End: 2025-05-22
Payer: MEDICAID

## 2025-05-22 DIAGNOSIS — K08.409 PARTIAL LOSS OF TEETH, UNSPECIFIED CAUSE, UNSPECIFIED CLASS: ICD-10-CM

## 2025-05-22 DIAGNOSIS — Z98.890 OTHER SPECIFIED POSTPROCEDURAL STATES: Chronic | ICD-10-CM

## 2025-05-22 DIAGNOSIS — Z96.643 PRESENCE OF ARTIFICIAL HIP JOINT, BILATERAL: Chronic | ICD-10-CM

## 2025-05-22 PROCEDURE — D0170: CPT

## 2025-05-30 DIAGNOSIS — M79.671 PAIN IN RIGHT FOOT: ICD-10-CM

## 2025-05-30 DIAGNOSIS — M25.571 PAIN IN RIGHT ANKLE AND JOINTS OF RIGHT FOOT: ICD-10-CM
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
## 2025-05-30 DIAGNOSIS — M19.079 PRIMARY OSTEOARTHRITIS, UNSPECIFIED ANKLE AND FOOT: ICD-10-CM

## 2025-05-31 ENCOUNTER — OUTPATIENT (OUTPATIENT)
Dept: OUTPATIENT SERVICES | Facility: HOSPITAL | Age: 65
LOS: 1 days | End: 2025-05-31
Payer: MEDICAID

## 2025-05-31 DIAGNOSIS — Z96.643 PRESENCE OF ARTIFICIAL HIP JOINT, BILATERAL: Chronic | ICD-10-CM

## 2025-05-31 DIAGNOSIS — Z98.890 OTHER SPECIFIED POSTPROCEDURAL STATES: Chronic | ICD-10-CM

## 2025-05-31 DIAGNOSIS — Z00.00 ENCOUNTER FOR GENERAL ADULT MEDICAL EXAMINATION WITHOUT ABNORMAL FINDINGS: ICD-10-CM

## 2025-05-31 PROCEDURE — 80061 LIPID PANEL: CPT

## 2025-05-31 PROCEDURE — 80053 COMPREHEN METABOLIC PANEL: CPT

## 2025-05-31 PROCEDURE — 82306 VITAMIN D 25 HYDROXY: CPT

## 2025-05-31 PROCEDURE — 81003 URINALYSIS AUTO W/O SCOPE: CPT

## 2025-05-31 PROCEDURE — 84443 ASSAY THYROID STIM HORMONE: CPT

## 2025-05-31 PROCEDURE — 83036 HEMOGLOBIN GLYCOSYLATED A1C: CPT

## 2025-05-31 PROCEDURE — 85025 COMPLETE CBC W/AUTO DIFF WBC: CPT

## 2025-06-01 DIAGNOSIS — Z00.00 ENCOUNTER FOR GENERAL ADULT MEDICAL EXAMINATION WITHOUT ABNORMAL FINDINGS: ICD-10-CM

## 2025-06-06 ENCOUNTER — OUTPATIENT (OUTPATIENT)
Dept: OUTPATIENT SERVICES | Facility: HOSPITAL | Age: 65
LOS: 1 days | End: 2025-06-06
Payer: MEDICAID

## 2025-06-06 DIAGNOSIS — K08.409 PARTIAL LOSS OF TEETH, UNSPECIFIED CAUSE, UNSPECIFIED CLASS: ICD-10-CM

## 2025-06-06 DIAGNOSIS — Z98.890 OTHER SPECIFIED POSTPROCEDURAL STATES: Chronic | ICD-10-CM

## 2025-06-06 DIAGNOSIS — Z96.643 PRESENCE OF ARTIFICIAL HIP JOINT, BILATERAL: Chronic | ICD-10-CM

## 2025-06-06 PROCEDURE — D5214: CPT

## 2025-06-08 ENCOUNTER — OUTPATIENT (OUTPATIENT)
Dept: OUTPATIENT SERVICES | Facility: HOSPITAL | Age: 65
LOS: 1 days | End: 2025-06-08
Payer: MEDICARE

## 2025-06-08 DIAGNOSIS — Z98.890 OTHER SPECIFIED POSTPROCEDURAL STATES: Chronic | ICD-10-CM

## 2025-06-08 DIAGNOSIS — Z00.8 ENCOUNTER FOR OTHER GENERAL EXAMINATION: ICD-10-CM

## 2025-06-08 DIAGNOSIS — Z96.643 PRESENCE OF ARTIFICIAL HIP JOINT, BILATERAL: Chronic | ICD-10-CM

## 2025-06-08 DIAGNOSIS — R93.89 ABNORMAL FINDINGS ON DIAGNOSTIC IMAGING OF OTHER SPECIFIED BODY STRUCTURES: ICD-10-CM

## 2025-06-08 PROCEDURE — 71250 CT THORAX DX C-: CPT | Mod: 26

## 2025-06-08 PROCEDURE — 71250 CT THORAX DX C-: CPT

## 2025-06-09 ENCOUNTER — NON-APPOINTMENT (OUTPATIENT)
Age: 65
End: 2025-06-09

## 2025-06-09 DIAGNOSIS — R93.89 ABNORMAL FINDINGS ON DIAGNOSTIC IMAGING OF OTHER SPECIFIED BODY STRUCTURES: ICD-10-CM

## 2025-06-10 ENCOUNTER — NON-APPOINTMENT (OUTPATIENT)
Age: 65
End: 2025-06-10

## 2025-06-11 ENCOUNTER — APPOINTMENT (OUTPATIENT)
Dept: INTERNAL MEDICINE | Facility: CLINIC | Age: 65
End: 2025-06-11

## 2025-06-16 ENCOUNTER — OUTPATIENT (OUTPATIENT)
Dept: OUTPATIENT SERVICES | Facility: HOSPITAL | Age: 65
LOS: 1 days | End: 2025-06-16
Payer: MEDICAID

## 2025-06-16 DIAGNOSIS — Z98.890 OTHER SPECIFIED POSTPROCEDURAL STATES: Chronic | ICD-10-CM

## 2025-06-16 DIAGNOSIS — Z96.643 PRESENCE OF ARTIFICIAL HIP JOINT, BILATERAL: Chronic | ICD-10-CM

## 2025-06-16 DIAGNOSIS — K08.409 PARTIAL LOSS OF TEETH, UNSPECIFIED CAUSE, UNSPECIFIED CLASS: ICD-10-CM

## 2025-06-16 PROCEDURE — D0140: CPT

## 2025-06-16 PROCEDURE — D0220: CPT

## 2025-06-17 DIAGNOSIS — K04.7 PERIAPICAL ABSCESS WITHOUT SINUS: ICD-10-CM

## 2025-06-23 ENCOUNTER — OUTPATIENT (OUTPATIENT)
Dept: OUTPATIENT SERVICES | Facility: HOSPITAL | Age: 65
LOS: 1 days | End: 2025-06-23
Payer: MEDICARE

## 2025-06-23 ENCOUNTER — RESULT REVIEW (OUTPATIENT)
Age: 65
End: 2025-06-23

## 2025-06-23 DIAGNOSIS — Z98.890 OTHER SPECIFIED POSTPROCEDURAL STATES: Chronic | ICD-10-CM

## 2025-06-23 DIAGNOSIS — R93.89 ABNORMAL FINDINGS ON DIAGNOSTIC IMAGING OF OTHER SPECIFIED BODY STRUCTURES: ICD-10-CM

## 2025-06-23 DIAGNOSIS — Z96.643 PRESENCE OF ARTIFICIAL HIP JOINT, BILATERAL: Chronic | ICD-10-CM

## 2025-06-23 LAB — GLUCOSE BLDC GLUCOMTR-MCNC: 90 MG/DL — SIGNIFICANT CHANGE UP (ref 70–99)

## 2025-06-23 PROCEDURE — 78815 PET IMAGE W/CT SKULL-THIGH: CPT | Mod: PI

## 2025-06-23 PROCEDURE — A9552: CPT

## 2025-06-23 PROCEDURE — 82962 GLUCOSE BLOOD TEST: CPT

## 2025-06-23 PROCEDURE — 78815 PET IMAGE W/CT SKULL-THIGH: CPT | Mod: 26,PI

## 2025-06-24 DIAGNOSIS — R93.89 ABNORMAL FINDINGS ON DIAGNOSTIC IMAGING OF OTHER SPECIFIED BODY STRUCTURES: ICD-10-CM

## 2025-06-25 PROBLEM — N28.9 KIDNEY LESION: Status: ACTIVE | Noted: 2025-06-25

## 2025-06-27 ENCOUNTER — OUTPATIENT (OUTPATIENT)
Dept: OUTPATIENT SERVICES | Facility: HOSPITAL | Age: 65
LOS: 1 days | End: 2025-06-27

## 2025-06-27 DIAGNOSIS — Z96.643 PRESENCE OF ARTIFICIAL HIP JOINT, BILATERAL: Chronic | ICD-10-CM

## 2025-06-27 DIAGNOSIS — Z98.890 OTHER SPECIFIED POSTPROCEDURAL STATES: Chronic | ICD-10-CM

## 2025-06-27 DIAGNOSIS — K02.9 DENTAL CARIES, UNSPECIFIED: ICD-10-CM

## 2025-06-27 PROCEDURE — D7510: CPT

## 2025-07-07 ENCOUNTER — OUTPATIENT (OUTPATIENT)
Dept: OUTPATIENT SERVICES | Facility: HOSPITAL | Age: 65
LOS: 1 days | End: 2025-07-07
Payer: MEDICARE

## 2025-07-07 DIAGNOSIS — Z00.8 ENCOUNTER FOR OTHER GENERAL EXAMINATION: ICD-10-CM

## 2025-07-07 DIAGNOSIS — Z96.643 PRESENCE OF ARTIFICIAL HIP JOINT, BILATERAL: Chronic | ICD-10-CM

## 2025-07-07 DIAGNOSIS — Z98.890 OTHER SPECIFIED POSTPROCEDURAL STATES: Chronic | ICD-10-CM

## 2025-07-07 DIAGNOSIS — N28.9 DISORDER OF KIDNEY AND URETER, UNSPECIFIED: ICD-10-CM

## 2025-07-07 PROCEDURE — 76700 US EXAM ABDOM COMPLETE: CPT | Mod: 26

## 2025-07-07 PROCEDURE — 76700 US EXAM ABDOM COMPLETE: CPT

## 2025-07-08 ENCOUNTER — OUTPATIENT (OUTPATIENT)
Dept: OUTPATIENT SERVICES | Facility: HOSPITAL | Age: 65
LOS: 1 days | End: 2025-07-08
Payer: MEDICAID

## 2025-07-08 DIAGNOSIS — Z98.890 OTHER SPECIFIED POSTPROCEDURAL STATES: Chronic | ICD-10-CM

## 2025-07-08 DIAGNOSIS — Z96.643 PRESENCE OF ARTIFICIAL HIP JOINT, BILATERAL: Chronic | ICD-10-CM

## 2025-07-08 DIAGNOSIS — N28.9 DISORDER OF KIDNEY AND URETER, UNSPECIFIED: ICD-10-CM

## 2025-07-08 DIAGNOSIS — K02.7 DENTAL ROOT CARIES: ICD-10-CM

## 2025-07-08 PROCEDURE — D0170: CPT

## 2025-07-08 NOTE — PHYSICAL EXAM
[General Appearance - Alert] : alert [General Appearance - In No Acute Distress] : in no acute distress [General Appearance - Well Nourished] : well nourished [General Appearance - Well Developed] : well developed [Sensation] : the sensory exam was normal to light touch and pinprick [No Focal Deficits] : no focal deficits [de-identified] : R dorsum midfoot joint pain on palpation  [FreeTextEntry1] : No abnormality was found on R foot skin MSK XR negative - No fracture, No dislocation, No foreign body

## 2025-07-10 DIAGNOSIS — K02.9 DENTAL CARIES, UNSPECIFIED: ICD-10-CM

## 2025-07-15 ENCOUNTER — OUTPATIENT (OUTPATIENT)
Dept: OUTPATIENT SERVICES | Facility: HOSPITAL | Age: 65
LOS: 1 days | End: 2025-07-15
Payer: MEDICAID

## 2025-07-15 DIAGNOSIS — Z96.643 PRESENCE OF ARTIFICIAL HIP JOINT, BILATERAL: Chronic | ICD-10-CM

## 2025-07-15 DIAGNOSIS — K02.7 DENTAL ROOT CARIES: ICD-10-CM

## 2025-07-15 DIAGNOSIS — Z98.890 OTHER SPECIFIED POSTPROCEDURAL STATES: Chronic | ICD-10-CM

## 2025-07-15 PROCEDURE — D3310: CPT

## 2025-07-17 DIAGNOSIS — K02.9 DENTAL CARIES, UNSPECIFIED: ICD-10-CM

## 2025-08-04 ENCOUNTER — OUTPATIENT (OUTPATIENT)
Dept: OUTPATIENT SERVICES | Facility: HOSPITAL | Age: 65
LOS: 1 days | End: 2025-08-04
Payer: MEDICAID

## 2025-08-04 DIAGNOSIS — Z98.890 OTHER SPECIFIED POSTPROCEDURAL STATES: Chronic | ICD-10-CM

## 2025-08-04 DIAGNOSIS — K08.409 PARTIAL LOSS OF TEETH, UNSPECIFIED CAUSE, UNSPECIFIED CLASS: ICD-10-CM

## 2025-08-04 DIAGNOSIS — Z96.643 PRESENCE OF ARTIFICIAL HIP JOINT, BILATERAL: Chronic | ICD-10-CM

## 2025-08-04 PROCEDURE — D5212: CPT

## 2025-08-18 ENCOUNTER — OUTPATIENT (OUTPATIENT)
Dept: OUTPATIENT SERVICES | Facility: HOSPITAL | Age: 65
LOS: 1 days | End: 2025-08-18
Payer: MEDICAID

## 2025-08-18 DIAGNOSIS — Z98.890 OTHER SPECIFIED POSTPROCEDURAL STATES: Chronic | ICD-10-CM

## 2025-08-18 DIAGNOSIS — Z96.643 PRESENCE OF ARTIFICIAL HIP JOINT, BILATERAL: Chronic | ICD-10-CM

## 2025-08-18 DIAGNOSIS — K08.409 PARTIAL LOSS OF TEETH, UNSPECIFIED CAUSE, UNSPECIFIED CLASS: ICD-10-CM

## 2025-08-18 PROCEDURE — D2950: CPT

## 2025-08-18 PROCEDURE — D5214: CPT

## 2025-09-03 ENCOUNTER — RX RENEWAL (OUTPATIENT)
Age: 65
End: 2025-09-03

## 2025-09-03 RX ORDER — ASPIRIN 81 MG/1
81 TABLET, COATED ORAL
Qty: 90 | Refills: 2 | Status: ACTIVE | COMMUNITY
Start: 2025-09-03 | End: 1900-01-01